# Patient Record
Sex: MALE | Race: WHITE | Employment: UNEMPLOYED | URBAN - METROPOLITAN AREA
[De-identification: names, ages, dates, MRNs, and addresses within clinical notes are randomized per-mention and may not be internally consistent; named-entity substitution may affect disease eponyms.]

---

## 2017-01-17 RX ORDER — SODIUM CHLORIDE 0.9 % (FLUSH) 0.9 %
10 SYRINGE (ML) INJECTION EVERY 12 HOURS SCHEDULED
Status: CANCELLED | OUTPATIENT
Start: 2017-01-17

## 2017-01-17 RX ORDER — SODIUM CHLORIDE, SODIUM LACTATE, POTASSIUM CHLORIDE, CALCIUM CHLORIDE 600; 310; 30; 20 MG/100ML; MG/100ML; MG/100ML; MG/100ML
INJECTION, SOLUTION INTRAVENOUS CONTINUOUS
Status: CANCELLED | OUTPATIENT
Start: 2017-01-17

## 2017-01-17 RX ORDER — SODIUM CHLORIDE 0.9 % (FLUSH) 0.9 %
10 SYRINGE (ML) INJECTION PRN
Status: CANCELLED | OUTPATIENT
Start: 2017-01-17

## 2017-01-17 RX ORDER — LIDOCAINE HYDROCHLORIDE 10 MG/ML
1 INJECTION, SOLUTION EPIDURAL; INFILTRATION; INTRACAUDAL; PERINEURAL
Status: CANCELLED | OUTPATIENT
Start: 2017-01-17 | End: 2017-01-17

## 2017-01-18 ENCOUNTER — HOSPITAL ENCOUNTER (OUTPATIENT)
Dept: SURGERY | Age: 67
Discharge: HOME | End: 2017-02-06
Attending: UROLOGY | Admitting: UROLOGY

## 2017-01-18 DIAGNOSIS — J44.9 CHRONIC OBSTRUCTIVE PULMONARY DISEASE (HCC): ICD-10-CM

## 2017-01-31 ENCOUNTER — OFFICE VISIT (OUTPATIENT)
Dept: PULMONOLOGY | Age: 67
End: 2017-01-31

## 2017-01-31 ENCOUNTER — HOSPITAL ENCOUNTER (OUTPATIENT)
Dept: PULMONOLOGY | Age: 67
Discharge: HOME | End: 2017-01-31
Attending: INTERNAL MEDICINE | Admitting: INTERNAL MEDICINE

## 2017-01-31 VITALS
SYSTOLIC BLOOD PRESSURE: 102 MMHG | HEART RATE: 89 BPM | BODY MASS INDEX: 32.65 KG/M2 | WEIGHT: 196 LBS | OXYGEN SATURATION: 97 % | RESPIRATION RATE: 18 BRPM | HEIGHT: 65 IN | TEMPERATURE: 97.7 F | DIASTOLIC BLOOD PRESSURE: 67 MMHG

## 2017-01-31 DIAGNOSIS — J44.9 COPD, SEVERE (HCC): Primary | ICD-10-CM

## 2017-01-31 DIAGNOSIS — Z72.0 TOBACCO ABUSE: ICD-10-CM

## 2017-01-31 DIAGNOSIS — J44.9 CHRONIC OBSTRUCTIVE PULMONARY DISEASE (HCC): ICD-10-CM

## 2017-01-31 DIAGNOSIS — J96.11 CHRONIC RESPIRATORY FAILURE WITH HYPOXIA (HCC): ICD-10-CM

## 2017-01-31 PROCEDURE — 99214 OFFICE O/P EST MOD 30 MIN: CPT | Performed by: INTERNAL MEDICINE

## 2017-01-31 RX ORDER — ALBUTEROL SULFATE 2.5 MG/3ML
2.5 SOLUTION RESPIRATORY (INHALATION) ONCE
Status: COMPLETED | OUTPATIENT
Start: 2017-01-31 | End: 2017-01-31

## 2017-01-31 RX ADMIN — ALBUTEROL SULFATE 2.5 MG: 2.5 SOLUTION RESPIRATORY (INHALATION) at 11:44

## 2017-08-08 ENCOUNTER — TELEPHONE (OUTPATIENT)
Dept: PULMONOLOGY | Age: 67
End: 2017-08-08

## 2018-12-11 ENCOUNTER — HOSPITAL ENCOUNTER (INPATIENT)
Age: 68
LOS: 6 days | Discharge: HOME | DRG: 243 | End: 2018-12-17
Attending: INTERNAL MEDICINE | Admitting: INTERNAL MEDICINE
Payer: MEDICAID

## 2018-12-11 PROBLEM — J18.9 PNEUMONIA: Status: ACTIVE | Noted: 2018-12-11

## 2018-12-11 PROBLEM — R13.10 DYSPHAGIA: Status: ACTIVE | Noted: 2018-12-11

## 2018-12-11 LAB
A/G RATIO: 1.3 (ref 1.1–2.2)
ALBUMIN SERPL-MCNC: 3.4 G/DL (ref 3.4–5)
ALP BLD-CCNC: 56 U/L (ref 40–129)
ALT SERPL-CCNC: <5 U/L (ref 10–40)
ANION GAP SERPL CALCULATED.3IONS-SCNC: 6 MMOL/L (ref 3–16)
AST SERPL-CCNC: 9 U/L (ref 15–37)
BASOPHILS ABSOLUTE: 0 K/UL (ref 0–0.2)
BASOPHILS RELATIVE PERCENT: 0.5 %
BILIRUB SERPL-MCNC: 0.4 MG/DL (ref 0–1)
BUN BLDV-MCNC: 10 MG/DL (ref 7–20)
CALCIUM SERPL-MCNC: 9.7 MG/DL (ref 8.3–10.6)
CHLORIDE BLD-SCNC: 97 MMOL/L (ref 99–110)
CO2: 34 MMOL/L (ref 21–32)
CREAT SERPL-MCNC: 0.7 MG/DL (ref 0.8–1.3)
EOSINOPHILS ABSOLUTE: 0 K/UL (ref 0–0.6)
EOSINOPHILS RELATIVE PERCENT: 0.2 %
GFR AFRICAN AMERICAN: >60
GFR NON-AFRICAN AMERICAN: >60
GLOBULIN: 2.7 G/DL
GLUCOSE BLD-MCNC: 102 MG/DL (ref 70–99)
HCT VFR BLD CALC: 34.4 % (ref 40.5–52.5)
HEMOGLOBIN: 11.1 G/DL (ref 13.5–17.5)
LYMPHOCYTES ABSOLUTE: 1.4 K/UL (ref 1–5.1)
LYMPHOCYTES RELATIVE PERCENT: 17.3 %
MAGNESIUM: 1.7 MG/DL (ref 1.8–2.4)
MCH RBC QN AUTO: 26.6 PG (ref 26–34)
MCHC RBC AUTO-ENTMCNC: 32.2 G/DL (ref 31–36)
MCV RBC AUTO: 82.5 FL (ref 80–100)
MONOCYTES ABSOLUTE: 0.9 K/UL (ref 0–1.3)
MONOCYTES RELATIVE PERCENT: 10.3 %
NEUTROPHILS ABSOLUTE: 6 K/UL (ref 1.7–7.7)
NEUTROPHILS RELATIVE PERCENT: 71.7 %
PDW BLD-RTO: 22.5 % (ref 12.4–15.4)
PLATELET # BLD: 222 K/UL (ref 135–450)
PMV BLD AUTO: 8.9 FL (ref 5–10.5)
POTASSIUM REFLEX MAGNESIUM: 3.3 MMOL/L (ref 3.5–5.1)
RBC # BLD: 4.17 M/UL (ref 4.2–5.9)
SODIUM BLD-SCNC: 137 MMOL/L (ref 136–145)
TOTAL PROTEIN: 6.1 G/DL (ref 6.4–8.2)
WBC # BLD: 8.3 K/UL (ref 4–11)

## 2018-12-11 PROCEDURE — 94667 MNPJ CHEST WALL 1ST: CPT

## 2018-12-11 PROCEDURE — 94664 DEMO&/EVAL PT USE INHALER: CPT

## 2018-12-11 PROCEDURE — 83550 IRON BINDING TEST: CPT

## 2018-12-11 PROCEDURE — 2580000003 HC RX 258: Performed by: PHYSICIAN ASSISTANT

## 2018-12-11 PROCEDURE — 2700000000 HC OXYGEN THERAPY PER DAY

## 2018-12-11 PROCEDURE — 83735 ASSAY OF MAGNESIUM: CPT

## 2018-12-11 PROCEDURE — 6370000000 HC RX 637 (ALT 250 FOR IP): Performed by: INTERNAL MEDICINE

## 2018-12-11 PROCEDURE — 99223 1ST HOSP IP/OBS HIGH 75: CPT | Performed by: PHYSICIAN ASSISTANT

## 2018-12-11 PROCEDURE — 36415 COLL VENOUS BLD VENIPUNCTURE: CPT

## 2018-12-11 PROCEDURE — 83540 ASSAY OF IRON: CPT

## 2018-12-11 PROCEDURE — 80053 COMPREHEN METABOLIC PANEL: CPT

## 2018-12-11 PROCEDURE — 82607 VITAMIN B-12: CPT

## 2018-12-11 PROCEDURE — 82746 ASSAY OF FOLIC ACID SERUM: CPT

## 2018-12-11 PROCEDURE — 6360000002 HC RX W HCPCS: Performed by: PHYSICIAN ASSISTANT

## 2018-12-11 PROCEDURE — 94640 AIRWAY INHALATION TREATMENT: CPT

## 2018-12-11 PROCEDURE — 1200000000 HC SEMI PRIVATE

## 2018-12-11 PROCEDURE — 85025 COMPLETE CBC W/AUTO DIFF WBC: CPT

## 2018-12-11 PROCEDURE — 82728 ASSAY OF FERRITIN: CPT

## 2018-12-11 PROCEDURE — 94668 MNPJ CHEST WALL SBSQ: CPT

## 2018-12-11 PROCEDURE — 6370000000 HC RX 637 (ALT 250 FOR IP): Performed by: PHYSICIAN ASSISTANT

## 2018-12-11 PROCEDURE — 93005 ELECTROCARDIOGRAM TRACING: CPT | Performed by: INTERNAL MEDICINE

## 2018-12-11 PROCEDURE — 94150 VITAL CAPACITY TEST: CPT

## 2018-12-11 RX ORDER — IPRATROPIUM BROMIDE AND ALBUTEROL SULFATE 2.5; .5 MG/3ML; MG/3ML
1 SOLUTION RESPIRATORY (INHALATION)
Status: DISCONTINUED | OUTPATIENT
Start: 2018-12-11 | End: 2018-12-14

## 2018-12-11 RX ORDER — PREDNISONE 10 MG/1
30 TABLET ORAL DAILY
Status: ON HOLD | COMMUNITY
Start: 2018-12-12 | End: 2018-12-13 | Stop reason: HOSPADM

## 2018-12-11 RX ORDER — SUCRALFATE ORAL 1 G/10ML
1 SUSPENSION ORAL
Status: DISCONTINUED | OUTPATIENT
Start: 2018-12-11 | End: 2018-12-13

## 2018-12-11 RX ORDER — PANTOPRAZOLE SODIUM 40 MG/1
40 TABLET, DELAYED RELEASE ORAL
Status: DISCONTINUED | OUTPATIENT
Start: 2018-12-12 | End: 2018-12-13

## 2018-12-11 RX ORDER — DILTIAZEM HYDROCHLORIDE 240 MG/1
240 CAPSULE, EXTENDED RELEASE ORAL DAILY
Status: ON HOLD | COMMUNITY
Start: 2018-12-12 | End: 2018-12-13 | Stop reason: HOSPADM

## 2018-12-11 RX ORDER — DILTIAZEM HYDROCHLORIDE 240 MG/1
240 CAPSULE, COATED, EXTENDED RELEASE ORAL DAILY
Status: DISCONTINUED | OUTPATIENT
Start: 2018-12-11 | End: 2018-12-12

## 2018-12-11 RX ORDER — PANTOPRAZOLE SODIUM 40 MG/1
40 TABLET, DELAYED RELEASE ORAL DAILY
Status: ON HOLD | COMMUNITY
Start: 2018-12-12 | End: 2018-12-25

## 2018-12-11 RX ORDER — ALBUTEROL SULFATE 90 UG/1
2 AEROSOL, METERED RESPIRATORY (INHALATION) EVERY 6 HOURS PRN
Status: DISCONTINUED | OUTPATIENT
Start: 2018-12-11 | End: 2018-12-14

## 2018-12-11 RX ORDER — SODIUM CHLORIDE 0.9 % (FLUSH) 0.9 %
10 SYRINGE (ML) INJECTION PRN
Status: DISCONTINUED | OUTPATIENT
Start: 2018-12-11 | End: 2018-12-17 | Stop reason: HOSPADM

## 2018-12-11 RX ORDER — ONDANSETRON 2 MG/ML
4 INJECTION INTRAMUSCULAR; INTRAVENOUS EVERY 6 HOURS PRN
Status: DISCONTINUED | OUTPATIENT
Start: 2018-12-11 | End: 2018-12-17 | Stop reason: HOSPADM

## 2018-12-11 RX ORDER — TAMSULOSIN HYDROCHLORIDE 0.4 MG/1
0.4 CAPSULE ORAL DAILY
Status: DISCONTINUED | OUTPATIENT
Start: 2018-12-11 | End: 2018-12-17 | Stop reason: HOSPADM

## 2018-12-11 RX ORDER — CEFDINIR 300 MG/1
300 CAPSULE ORAL 2 TIMES DAILY
Status: ON HOLD | COMMUNITY
Start: 2018-12-11 | End: 2018-12-13 | Stop reason: HOSPADM

## 2018-12-11 RX ORDER — LEVOFLOXACIN 5 MG/ML
750 INJECTION, SOLUTION INTRAVENOUS EVERY 24 HOURS
Status: DISCONTINUED | OUTPATIENT
Start: 2018-12-11 | End: 2018-12-15

## 2018-12-11 RX ORDER — SUCRALFATE ORAL 1 G/10ML
1 SUSPENSION ORAL 4 TIMES DAILY
COMMUNITY
Start: 2018-12-11

## 2018-12-11 RX ORDER — IPRATROPIUM BROMIDE AND ALBUTEROL SULFATE 2.5; .5 MG/3ML; MG/3ML
1 SOLUTION RESPIRATORY (INHALATION)
Status: DISCONTINUED | OUTPATIENT
Start: 2018-12-11 | End: 2018-12-11

## 2018-12-11 RX ORDER — SODIUM CHLORIDE 9 MG/ML
INJECTION, SOLUTION INTRAVENOUS CONTINUOUS
Status: DISCONTINUED | OUTPATIENT
Start: 2018-12-11 | End: 2018-12-15

## 2018-12-11 RX ORDER — SODIUM CHLORIDE 0.9 % (FLUSH) 0.9 %
10 SYRINGE (ML) INJECTION EVERY 12 HOURS SCHEDULED
Status: DISCONTINUED | OUTPATIENT
Start: 2018-12-11 | End: 2018-12-17 | Stop reason: HOSPADM

## 2018-12-11 RX ADMIN — SUCRALFATE 1 G: 1 SUSPENSION ORAL at 20:14

## 2018-12-11 RX ADMIN — LEVOFLOXACIN 750 MG: 5 INJECTION, SOLUTION INTRAVENOUS at 17:25

## 2018-12-11 RX ADMIN — SODIUM CHLORIDE: 9 INJECTION, SOLUTION INTRAVENOUS at 17:22

## 2018-12-11 RX ADMIN — SODIUM CHLORIDE, PRESERVATIVE FREE 10 ML: 5 INJECTION INTRAVENOUS at 17:24

## 2018-12-11 RX ADMIN — IPRATROPIUM BROMIDE AND ALBUTEROL SULFATE 1 AMPULE: .5; 3 SOLUTION RESPIRATORY (INHALATION) at 19:28

## 2018-12-11 RX ADMIN — DILTIAZEM HYDROCHLORIDE 240 MG: 240 CAPSULE, COATED, EXTENDED RELEASE ORAL at 20:14

## 2018-12-11 RX ADMIN — IPRATROPIUM BROMIDE AND ALBUTEROL SULFATE 1 AMPULE: .5; 3 SOLUTION RESPIRATORY (INHALATION) at 23:26

## 2018-12-12 LAB
EKG ATRIAL RATE: 89 BPM
EKG DIAGNOSIS: NORMAL
EKG P AXIS: 59 DEGREES
EKG P-R INTERVAL: 142 MS
EKG Q-T INTERVAL: 382 MS
EKG QRS DURATION: 86 MS
EKG QTC CALCULATION (BAZETT): 464 MS
EKG R AXIS: 63 DEGREES
EKG T AXIS: 58 DEGREES
EKG VENTRICULAR RATE: 89 BPM
FERRITIN: 112.4 NG/ML (ref 30–400)
FOLATE: 8 NG/ML (ref 4.78–24.2)
IRON SATURATION: 12 % (ref 20–50)
IRON: 27 UG/DL (ref 59–158)
TOTAL IRON BINDING CAPACITY: 234 UG/DL (ref 260–445)
VITAMIN B-12: 1005 PG/ML (ref 211–911)

## 2018-12-12 PROCEDURE — 99152 MOD SED SAME PHYS/QHP 5/>YRS: CPT | Performed by: INTERNAL MEDICINE

## 2018-12-12 PROCEDURE — 92610 EVALUATE SWALLOWING FUNCTION: CPT

## 2018-12-12 PROCEDURE — 97116 GAIT TRAINING THERAPY: CPT

## 2018-12-12 PROCEDURE — 97166 OT EVAL MOD COMPLEX 45 MIN: CPT

## 2018-12-12 PROCEDURE — 94761 N-INVAS EAR/PLS OXIMETRY MLT: CPT

## 2018-12-12 PROCEDURE — 6370000000 HC RX 637 (ALT 250 FOR IP): Performed by: INTERNAL MEDICINE

## 2018-12-12 PROCEDURE — 99153 MOD SED SAME PHYS/QHP EA: CPT | Performed by: INTERNAL MEDICINE

## 2018-12-12 PROCEDURE — 6360000002 HC RX W HCPCS: Performed by: INTERNAL MEDICINE

## 2018-12-12 PROCEDURE — 1200000000 HC SEMI PRIVATE

## 2018-12-12 PROCEDURE — 2709999900 HC NON-CHARGEABLE SUPPLY: Performed by: INTERNAL MEDICINE

## 2018-12-12 PROCEDURE — 99232 SBSQ HOSP IP/OBS MODERATE 35: CPT | Performed by: INTERNAL MEDICINE

## 2018-12-12 PROCEDURE — 6360000002 HC RX W HCPCS: Performed by: PHYSICIAN ASSISTANT

## 2018-12-12 PROCEDURE — 97530 THERAPEUTIC ACTIVITIES: CPT

## 2018-12-12 PROCEDURE — G8997 SWALLOW GOAL STATUS: HCPCS

## 2018-12-12 PROCEDURE — G8978 MOBILITY CURRENT STATUS: HCPCS

## 2018-12-12 PROCEDURE — 94668 MNPJ CHEST WALL SBSQ: CPT

## 2018-12-12 PROCEDURE — G8988 SELF CARE GOAL STATUS: HCPCS

## 2018-12-12 PROCEDURE — C1726 CATH, BAL DIL, NON-VASCULAR: HCPCS | Performed by: INTERNAL MEDICINE

## 2018-12-12 PROCEDURE — 97161 PT EVAL LOW COMPLEX 20 MIN: CPT

## 2018-12-12 PROCEDURE — G8979 MOBILITY GOAL STATUS: HCPCS

## 2018-12-12 PROCEDURE — 2700000000 HC OXYGEN THERAPY PER DAY

## 2018-12-12 PROCEDURE — 7100000010 HC PHASE II RECOVERY - FIRST 15 MIN: Performed by: INTERNAL MEDICINE

## 2018-12-12 PROCEDURE — 94664 DEMO&/EVAL PT USE INHALER: CPT

## 2018-12-12 PROCEDURE — 7100000011 HC PHASE II RECOVERY - ADDTL 15 MIN: Performed by: INTERNAL MEDICINE

## 2018-12-12 PROCEDURE — G8996 SWALLOW CURRENT STATUS: HCPCS

## 2018-12-12 PROCEDURE — 0D738ZZ DILATION OF LOWER ESOPHAGUS, VIA NATURAL OR ARTIFICIAL OPENING ENDOSCOPIC: ICD-10-PCS | Performed by: INTERNAL MEDICINE

## 2018-12-12 PROCEDURE — 2580000003 HC RX 258: Performed by: INTERNAL MEDICINE

## 2018-12-12 PROCEDURE — 3609012500 HC EGD DILATION BALLOON: Performed by: INTERNAL MEDICINE

## 2018-12-12 PROCEDURE — 93010 ELECTROCARDIOGRAM REPORT: CPT | Performed by: INTERNAL MEDICINE

## 2018-12-12 PROCEDURE — G8987 SELF CARE CURRENT STATUS: HCPCS

## 2018-12-12 PROCEDURE — 94640 AIRWAY INHALATION TREATMENT: CPT

## 2018-12-12 PROCEDURE — 2580000003 HC RX 258: Performed by: PHYSICIAN ASSISTANT

## 2018-12-12 RX ORDER — GABAPENTIN 400 MG/1
400 CAPSULE ORAL 3 TIMES DAILY
COMMUNITY

## 2018-12-12 RX ORDER — POLYETHYLENE GLYCOL 3350 17 G/17G
17 POWDER, FOR SOLUTION ORAL DAILY
Status: ON HOLD | COMMUNITY
Start: 2018-05-04 | End: 2018-12-13 | Stop reason: HOSPADM

## 2018-12-12 RX ORDER — FENTANYL CITRATE 50 UG/ML
INJECTION, SOLUTION INTRAMUSCULAR; INTRAVENOUS PRN
Status: DISCONTINUED | OUTPATIENT
Start: 2018-12-12 | End: 2018-12-14

## 2018-12-12 RX ORDER — FUROSEMIDE 40 MG/1
40 TABLET ORAL DAILY
COMMUNITY
End: 2019-02-28

## 2018-12-12 RX ORDER — DILTIAZEM HYDROCHLORIDE 120 MG/1
120 CAPSULE, COATED, EXTENDED RELEASE ORAL DAILY
Status: DISCONTINUED | OUTPATIENT
Start: 2018-12-13 | End: 2018-12-17 | Stop reason: HOSPADM

## 2018-12-12 RX ORDER — DILTIAZEM HYDROCHLORIDE 240 MG/1
240 CAPSULE, COATED, EXTENDED RELEASE ORAL DAILY
Status: DISCONTINUED | OUTPATIENT
Start: 2018-12-13 | End: 2018-12-12

## 2018-12-12 RX ORDER — NITROGLYCERIN 0.4 MG/1
0.4 TABLET SUBLINGUAL EVERY 5 MIN PRN
COMMUNITY

## 2018-12-12 RX ORDER — ALBUTEROL SULFATE 90 UG/1
2 AEROSOL, METERED RESPIRATORY (INHALATION) EVERY 6 HOURS PRN
COMMUNITY

## 2018-12-12 RX ORDER — LORAZEPAM 1 MG/1
1 TABLET ORAL DAILY PRN
Status: ON HOLD | COMMUNITY
End: 2019-03-06 | Stop reason: SDUPTHER

## 2018-12-12 RX ORDER — MAGNESIUM SULFATE 1 G/100ML
1 INJECTION INTRAVENOUS ONCE
Status: COMPLETED | OUTPATIENT
Start: 2018-12-12 | End: 2018-12-12

## 2018-12-12 RX ORDER — ATORVASTATIN CALCIUM 10 MG/1
10 TABLET, FILM COATED ORAL NIGHTLY
COMMUNITY

## 2018-12-12 RX ORDER — CITALOPRAM 10 MG/1
10 TABLET ORAL DAILY
COMMUNITY

## 2018-12-12 RX ORDER — POTASSIUM CHLORIDE 750 MG/1
10 CAPSULE, EXTENDED RELEASE ORAL DAILY
COMMUNITY
End: 2019-02-28

## 2018-12-12 RX ORDER — 0.9 % SODIUM CHLORIDE 0.9 %
500 INTRAVENOUS SOLUTION INTRAVENOUS ONCE
Status: COMPLETED | OUTPATIENT
Start: 2018-12-12 | End: 2018-12-12

## 2018-12-12 RX ORDER — OXYCODONE HYDROCHLORIDE AND ACETAMINOPHEN 5; 325 MG/1; MG/1
1 TABLET ORAL EVERY 6 HOURS PRN
Status: ON HOLD | COMMUNITY
End: 2019-03-06 | Stop reason: SDUPTHER

## 2018-12-12 RX ORDER — RIVASTIGMINE 4.6 MG/24H
1 PATCH, EXTENDED RELEASE TRANSDERMAL DAILY
Status: ON HOLD | COMMUNITY
End: 2019-05-07 | Stop reason: HOSPADM

## 2018-12-12 RX ORDER — MIDAZOLAM HYDROCHLORIDE 5 MG/ML
INJECTION INTRAMUSCULAR; INTRAVENOUS PRN
Status: DISCONTINUED | OUTPATIENT
Start: 2018-12-12 | End: 2018-12-14

## 2018-12-12 RX ORDER — OXYCODONE HYDROCHLORIDE AND ACETAMINOPHEN 5; 325 MG/1; MG/1
1 TABLET ORAL ONCE
Status: COMPLETED | OUTPATIENT
Start: 2018-12-12 | End: 2018-12-12

## 2018-12-12 RX ADMIN — IPRATROPIUM BROMIDE AND ALBUTEROL SULFATE 1 AMPULE: .5; 3 SOLUTION RESPIRATORY (INHALATION) at 23:20

## 2018-12-12 RX ADMIN — LEVOFLOXACIN 750 MG: 5 INJECTION, SOLUTION INTRAVENOUS at 16:50

## 2018-12-12 RX ADMIN — SODIUM CHLORIDE 500 ML: 9 INJECTION, SOLUTION INTRAVENOUS at 07:56

## 2018-12-12 RX ADMIN — IPRATROPIUM BROMIDE AND ALBUTEROL SULFATE 1 AMPULE: .5; 3 SOLUTION RESPIRATORY (INHALATION) at 15:49

## 2018-12-12 RX ADMIN — SODIUM CHLORIDE: 9 INJECTION, SOLUTION INTRAVENOUS at 20:13

## 2018-12-12 RX ADMIN — OXYCODONE HYDROCHLORIDE AND ACETAMINOPHEN 1 TABLET: 5; 325 TABLET ORAL at 22:45

## 2018-12-12 RX ADMIN — SODIUM CHLORIDE: 9 INJECTION, SOLUTION INTRAVENOUS at 14:03

## 2018-12-12 RX ADMIN — IPRATROPIUM BROMIDE AND ALBUTEROL SULFATE 1 AMPULE: .5; 3 SOLUTION RESPIRATORY (INHALATION) at 07:24

## 2018-12-12 RX ADMIN — MAGNESIUM SULFATE HEPTAHYDRATE 1 G: 1 INJECTION, SOLUTION INTRAVENOUS at 09:16

## 2018-12-12 RX ADMIN — Medication 10 ML: at 09:15

## 2018-12-12 RX ADMIN — SUCRALFATE 1 G: 1 SUSPENSION ORAL at 16:50

## 2018-12-12 RX ADMIN — IPRATROPIUM BROMIDE AND ALBUTEROL SULFATE 1 AMPULE: .5; 3 SOLUTION RESPIRATORY (INHALATION) at 19:25

## 2018-12-12 ASSESSMENT — PAIN SCALES - GENERAL
PAINLEVEL_OUTOF10: 0
PAINLEVEL_OUTOF10: 8
PAINLEVEL_OUTOF10: 0
PAINLEVEL_OUTOF10: 0

## 2018-12-12 ASSESSMENT — PAIN DESCRIPTION - PAIN TYPE: TYPE: CHRONIC PAIN

## 2018-12-12 ASSESSMENT — PAIN - FUNCTIONAL ASSESSMENT: PAIN_FUNCTIONAL_ASSESSMENT: 0-10

## 2018-12-12 ASSESSMENT — PAIN DESCRIPTION - LOCATION: LOCATION: CHEST

## 2018-12-12 NOTE — PROGRESS NOTES
progress IND with functional mobility. Pt. Limited during evaluation by weakness, poor safety awareness    Goal(s) : To be met in 3 visits:  1). Independent with PLB technique x10 reps    To be met in 6 visits:  1). Supine to/from sit: Supervision  2). Sit to/from stand: SBA  3). Bed to chair: SBA  4). Gait x 150' with LRAD and SBA  5). Tolerate B LE exercises 10 reps  6). Up and Down steps x 2 with HR and CGA    Rehabilitation Potential   Good for goals listed above. Strengths for achieving goals include: PLOF  Barriers to achieving goals include: poor safety awareness    Plan    To be seen 5x/week while in acute care setting for therapeutic exercises, bed mobility, transfers, progressive gait training, balance training, and family/patient education. Timed Code Treatment Minutes:  25 minutes  Total Treatment Time:   35 minutes    Lucinda Pham PT, DPT #[id number]    If patient discharges from this facility prior to next visit, this note will serve as the Discharge Summary.

## 2018-12-12 NOTE — PROGRESS NOTES
Per wife, med list we received from [address] in [place] is incomplete, she states he also gets medication from [facility] in [place].  Will call to get complete med list.

## 2018-12-12 NOTE — CONSULTS
History and Physical / Pre-Sedation Assessment    Patient:  Oneyda Riley   :   1950     Intended Procedure:  EGD    HPI: 80year old male with history of COPD, dementia admitted with dysphagia. Nurses notes reviewed and agreed. Medications reviewed  Allergies: Allergies   Allergen Reactions    Heparin      Pt was HIT positive in        Physical Exam:  Vital Signs: BP (!) 97/54   Pulse 67   Temp 98.1 °F (36.7 °C) (Temporal)   Resp 16   SpO2 97%    Airway: Mallampati: II (soft palate, uvula, fauces visible)  Pulmonary:Normal  Cardiac:Normal  Abdomen:Normal    Pre-Procedure Assessment / Plan:  ASA: Class 2 - A normal healthy patient with mild systemic disease  Level of Sedation Plan: Moderate sedation  Post Procedure plan: Return to same level of care    I assessed the patient and find that the patient is in satisfactory condition to proceed with the planned procedure and sedation plan. I have explained the risk, benefits, and alternatives to the procedure; the patient understands and agrees to proceed.        Nisa Herrera  2018

## 2018-12-12 NOTE — PROGRESS NOTES
Spoke to Dr. Deri Gaucher regarding lower BP's. New order for NS Bolus 500 ml over 1 hr once with repeat back.       12/12/18 0744   Vital Signs   Temp 98.1 °F (36.7 °C)   Temp Source Oral   Pulse 76   Resp 16   BP (!) 82/49   BP Location Right upper arm   BP Upper/Lower Upper   Patient Position Semi fowlers   Level of Consciousness 0   MEWS Score 2   Oxygen Therapy   SpO2 93 %   O2 Device Nasal cannula   O2 Flow Rate (L/min) 2 L/min

## 2018-12-12 NOTE — CARE COORDINATION
Case Management Assessment  Initial Evaluation    Date/Time of Evaluation: 12/12/2018 1:52 PM  Assessment Completed by: Rudolph Hadley    Patient Name: Delano Sawyer  YOB: 1950  Diagnosis: Pneumonia [J18.9]  Date / Time: 12/11/2018  3:56 PM  Admission status/Date:inpatient 12/11/18  Chart Reviewed: Yes      Patient Interviewed: No   Family Interviewed:  Yes - Nini Sharla      Hospitalization in the last 30 days:  No    Contacts  :     Relationship to Patient:   Phone Number:    Alternate Contact:     Relationship to Patient:     Phone Number:      Current PCP  Karena Maria MD      Financial  Commercial    ADLS  Support Systems: Spouse/Significant Other  Transportation: family    Meal Preparation: spouse    Housing  Home Environment: home with spouse  Steps: two  Plans to Return to Present Housing: Yes  Other Identified Issues: no    Home Care Information  Currently active with [address] : No  Type of Home Care Services: Aide Services, Nursing Services  Passport/Waiver : No  Passport/Waiver Services:   no    Durable Medical Equipment   DME Provider: [employer] for home O2  Equipment: nelsy DONALDSON    Has Home O2 in place on admit:  Yes  Informed of need to bring portable home O2 tank on day of discharge for nursing to connect prior to leaving:   Yes  Verbalized agreement/Understanding:   Yes    Community Service Affiliation  Dialysis:  No  Outpatient PT/OT: No    Cancer Center: No     CHF Clinic: No     Pulmonary Rehab: No  Pain Clinic: No  Community Mental Health: No    Wound Clinic: No     Other:   no    DISCHARGE PLAN: Reviewed Chart. Pt confused. Tele-sitter at bedside. Writer spoke with pt's Nini Magdaleno, for initial interview. Role of dcp explained. CM unable to reach spouse as mailbox is not set up. Pt from home with spouse and plan return with [facility] SNF. Awaiting PT evaluation. Pt will need transportation home at d/c as spouse does not drive per Nini Sharla. Following.

## 2018-12-12 NOTE — PROGRESS NOTES
Pt c/o \"chest pain\". States \"all the way across my chest but that was a little bit ago\". States \"Now is better\". Notified Aissatou, clinical, new order for stat EKG. Pt asked this RN 3 times while in the room what the telesitter was & each time was explained. PM assessment complete. Shanthi meds given. Bed alarm in place. Call light in reach. Will cont to monitor.  Humera Coles

## 2018-12-12 NOTE — PROGRESS NOTES
Called to see pt about c/o chest pain. Upon entering room, pt awake and denies chest pain at this time. States \"it's not uncommon for me to have chest pain, I have always had it since I was a teenager. \" EKG completed. Per hospitalist H&P, Cardiology felt his chest pain was non-cardiac Pt appears in no distress or discomfort at this time.  Carley Harris Clinical

## 2018-12-12 NOTE — PROGRESS NOTES
Acute Care COPD Evaluation  Occupational Therapy Evaluation    Unit: 2West   Date:  12/12/2018  Patient Name:    Sharan Gutiérrez  Admitting diagnosis:  Pneumonia [J18.9]  Admit Date:  12/11/2018  Precautions/Restrictions/WB Status/ Lines/ Wounds/ Oxygen: 2L O2, telesitter, IV, bed/chair alarm, fall risk, impulsive  Treatment Time:  6031-5120    COPD Protocol Day / Treatment #: 1    Patient Goals for Therapy:   get out of here     Discharge/Disposition Recommendations:                    AM-PAC Score: 15   SNF  DME needs for discharge: consider RW      Preadmission Environment    Pt. Lives with spouse and her dtr  Home environment:  Trailer  Steps to enter first floor:   2 with HR (on R going up)  Bathroom: [address] owned: , LORE[place], w/c              O2 related equipment: home O2 (2-3L), pulse ox, concentrator, inhaler, nebulizer     Preadmission Status   Pt. Not Able to drive   Pt. Is IND with dressing (occasional assist)  Pt. Had assistance from spouse for bathing, cooking, cleaning, laundry   Pt. Fully independent for transfers and gait and walked with SW  History of falls: Denies  Pt. fully independent with use O2 equipment    Pain    Rating: denies  Pain Medicine Status: Denies need    Cognition    A&O to person, place, year; stated July and August as guesses for month and unable to state December even with cues,    Patient lying supine in bed with no family present. Follows 1 step commands. Impulsivity noted. Appeared to perseverate on functional mobility in room, adamant about ability to walk a long distance, \"I hitchiked from [place],\" and needed max cues for incorporating seated rest break.     Upper Extremity ROM / Strength    WFL B'ly, shoulder flexion AROM limited to about 100 degrees bilaterally                     Upper Extremity Sensation     No apparent deficits     Upper Extremity Proprioception    No apparent deficits     Skin Integrity Visible skin intact    Coordination and Tone

## 2018-12-12 NOTE — PROGRESS NOTES
supervision or strategies. Two or more diet consistencies restricted   Pt seen upright in chair, alert and agreeable to evaluation, however, uncooperative with SLP at times. RN OK'd SLP entry and evaluation. Pt had EGD with dilation completed earlier this date and currently on clear liquid diet only per MD.  Pt demanding \"real food\"-- solid food trials during BSE OK'd per RN. Pt with extensive GI history per chart. Pt also has hx of COPD, emphysema, and PNA. Pt is a current smoker. Pt seen at [facility] (treated for CAP there) and transferred to [facility] for GI consult. Pt with extensive GI hx (pt had EGD with dilation completed this date, as well as, October and November 2018). Pt had MBSS completed on 6/29/18 and 8/21/18 per chart with most recent study reporting laryngeal penetration with both thin and nectar-thick liquid trials (unable to view full report in EMR). Pt currently on 2 L O2 NC. He presents with weak, hoarse vocal quality. Pt able to complete timely volitional swallow and congested volitional cough. Pt required encouragement throughout BSE to accept minimal PO trials (pt requesting \"cottage cheese\" and stating \"I want to eat a chicken leg\"). SLP reviewed previous soft solid diet recommendations at length with pt, however, pt refused teaching, stating \"I eat what I want\". Pt adamantly denied dysphagia when asked by SLP. Pt observed with thin liquid trials via cup and straw--grossly timely swallow initiation noted throughout. Pt quite impulsive and benefited from cueing to take small, single sips when drinking. Pt with immediate, congested cough post-swallow with TL trials via straw sip. No overt s/s of aspiration/penetration observed with TL trials via cup sip-- no coughing, throat clearing, wet vocal quality, or change in O2. Pt's laryngeal elevation appeared [facility] based on palpation of the anterior neck.   Pt also observed with puree and soft solid trials (again, solid PO trials during BSE

## 2018-12-12 NOTE — PLAN OF CARE
Problem: Falls - Risk of:  Goal: Will remain free from falls  Will remain free from falls   Outcome: Ongoing  Bed alarm    Problem: Airway Clearance - Ineffective:  Goal: Clear lung sounds  Clear lung sounds  Outcome: Ongoing  crackles    Problem: Hyperthermia:  Goal: Ability to maintain a body temperature in the normal range will improve  Ability to maintain a body temperature in the normal range will improve  Outcome: Met This Shift  afebrile

## 2018-12-13 ENCOUNTER — APPOINTMENT (OUTPATIENT)
Dept: GENERAL RADIOLOGY | Age: 68
DRG: 243 | End: 2018-12-13
Attending: INTERNAL MEDICINE
Payer: MEDICAID

## 2018-12-13 LAB
BASOPHILS ABSOLUTE: 0 K/UL (ref 0–0.2)
BASOPHILS RELATIVE PERCENT: 0.5 %
EOSINOPHILS ABSOLUTE: 0.2 K/UL (ref 0–0.6)
EOSINOPHILS RELATIVE PERCENT: 2.9 %
HCT VFR BLD CALC: 28.4 % (ref 40.5–52.5)
HEMOGLOBIN: 9.3 G/DL (ref 13.5–17.5)
LYMPHOCYTES ABSOLUTE: 1.8 K/UL (ref 1–5.1)
LYMPHOCYTES RELATIVE PERCENT: 27 %
MCH RBC QN AUTO: 27 PG (ref 26–34)
MCHC RBC AUTO-ENTMCNC: 32.7 G/DL (ref 31–36)
MCV RBC AUTO: 82.4 FL (ref 80–100)
MONOCYTES ABSOLUTE: 0.9 K/UL (ref 0–1.3)
MONOCYTES RELATIVE PERCENT: 12.6 %
NEUTROPHILS ABSOLUTE: 3.8 K/UL (ref 1.7–7.7)
NEUTROPHILS RELATIVE PERCENT: 57 %
PDW BLD-RTO: 22.7 % (ref 12.4–15.4)
PLATELET # BLD: 209 K/UL (ref 135–450)
PMV BLD AUTO: 8.8 FL (ref 5–10.5)
RBC # BLD: 3.45 M/UL (ref 4.2–5.9)
WBC # BLD: 6.7 K/UL (ref 4–11)

## 2018-12-13 PROCEDURE — 3609012500 HC EGD DILATION BALLOON: Performed by: INTERNAL MEDICINE

## 2018-12-13 PROCEDURE — 94668 MNPJ CHEST WALL SBSQ: CPT

## 2018-12-13 PROCEDURE — 6360000002 HC RX W HCPCS: Performed by: INTERNAL MEDICINE

## 2018-12-13 PROCEDURE — 7100000011 HC PHASE II RECOVERY - ADDTL 15 MIN: Performed by: INTERNAL MEDICINE

## 2018-12-13 PROCEDURE — 99232 SBSQ HOSP IP/OBS MODERATE 35: CPT | Performed by: INTERNAL MEDICINE

## 2018-12-13 PROCEDURE — C1726 CATH, BAL DIL, NON-VASCULAR: HCPCS | Performed by: INTERNAL MEDICINE

## 2018-12-13 PROCEDURE — 36415 COLL VENOUS BLD VENIPUNCTURE: CPT

## 2018-12-13 PROCEDURE — 94761 N-INVAS EAR/PLS OXIMETRY MLT: CPT

## 2018-12-13 PROCEDURE — 2580000003 HC RX 258: Performed by: INTERNAL MEDICINE

## 2018-12-13 PROCEDURE — 74220 X-RAY XM ESOPHAGUS 1CNTRST: CPT

## 2018-12-13 PROCEDURE — 0D738ZZ DILATION OF LOWER ESOPHAGUS, VIA NATURAL OR ARTIFICIAL OPENING ENDOSCOPIC: ICD-10-PCS | Performed by: INTERNAL MEDICINE

## 2018-12-13 PROCEDURE — 6370000000 HC RX 637 (ALT 250 FOR IP): Performed by: INTERNAL MEDICINE

## 2018-12-13 PROCEDURE — 1200000000 HC SEMI PRIVATE

## 2018-12-13 PROCEDURE — 94640 AIRWAY INHALATION TREATMENT: CPT

## 2018-12-13 PROCEDURE — 85025 COMPLETE CBC W/AUTO DIFF WBC: CPT

## 2018-12-13 PROCEDURE — 2709999900 HC NON-CHARGEABLE SUPPLY: Performed by: INTERNAL MEDICINE

## 2018-12-13 PROCEDURE — 7100000010 HC PHASE II RECOVERY - FIRST 15 MIN: Performed by: INTERNAL MEDICINE

## 2018-12-13 PROCEDURE — 6360000002 HC RX W HCPCS: Performed by: PHYSICIAN ASSISTANT

## 2018-12-13 PROCEDURE — 2700000000 HC OXYGEN THERAPY PER DAY

## 2018-12-13 PROCEDURE — 92526 ORAL FUNCTION THERAPY: CPT

## 2018-12-13 PROCEDURE — 99152 MOD SED SAME PHYS/QHP 5/>YRS: CPT | Performed by: INTERNAL MEDICINE

## 2018-12-13 PROCEDURE — 99153 MOD SED SAME PHYS/QHP EA: CPT | Performed by: INTERNAL MEDICINE

## 2018-12-13 PROCEDURE — 2580000003 HC RX 258: Performed by: PHYSICIAN ASSISTANT

## 2018-12-13 RX ORDER — SUCRALFATE ORAL 1 G/10ML
1 SUSPENSION ORAL EVERY 6 HOURS SCHEDULED
Status: DISCONTINUED | OUTPATIENT
Start: 2018-12-13 | End: 2018-12-17 | Stop reason: HOSPADM

## 2018-12-13 RX ORDER — SODIUM CHLORIDE, SODIUM LACTATE, POTASSIUM CHLORIDE, CALCIUM CHLORIDE 600; 310; 30; 20 MG/100ML; MG/100ML; MG/100ML; MG/100ML
INJECTION, SOLUTION INTRAVENOUS CONTINUOUS PRN
Status: DISCONTINUED | OUTPATIENT
Start: 2018-12-13 | End: 2018-12-14

## 2018-12-13 RX ORDER — PANTOPRAZOLE SODIUM 40 MG/1
40 GRANULE, DELAYED RELEASE ORAL
Status: DISCONTINUED | OUTPATIENT
Start: 2018-12-13 | End: 2018-12-17 | Stop reason: HOSPADM

## 2018-12-13 RX ADMIN — IPRATROPIUM BROMIDE AND ALBUTEROL SULFATE 1 AMPULE: .5; 3 SOLUTION RESPIRATORY (INHALATION) at 07:36

## 2018-12-13 RX ADMIN — IPRATROPIUM BROMIDE AND ALBUTEROL SULFATE 1 AMPULE: .5; 3 SOLUTION RESPIRATORY (INHALATION) at 19:35

## 2018-12-13 RX ADMIN — LEVOFLOXACIN 750 MG: 5 INJECTION, SOLUTION INTRAVENOUS at 16:55

## 2018-12-13 RX ADMIN — SODIUM CHLORIDE: 9 INJECTION, SOLUTION INTRAVENOUS at 22:50

## 2018-12-13 RX ADMIN — IPRATROPIUM BROMIDE AND ALBUTEROL SULFATE 1 AMPULE: .5; 3 SOLUTION RESPIRATORY (INHALATION) at 23:00

## 2018-12-13 RX ADMIN — ONDANSETRON 4 MG: 2 INJECTION INTRAMUSCULAR; INTRAVENOUS at 22:50

## 2018-12-13 RX ADMIN — SUCRALFATE 1 G: 1 SUSPENSION ORAL at 06:04

## 2018-12-13 RX ADMIN — PANTOPRAZOLE SODIUM 40 MG: 40 TABLET, DELAYED RELEASE ORAL at 06:04

## 2018-12-13 RX ADMIN — Medication 10 ML: at 20:15

## 2018-12-13 RX ADMIN — Medication 10 ML: at 10:31

## 2018-12-13 ASSESSMENT — PAIN - FUNCTIONAL ASSESSMENT: PAIN_FUNCTIONAL_ASSESSMENT: 0-10

## 2018-12-13 NOTE — PROGRESS NOTES
Physical Therapy    Attempted PT treatment this afternoon. Pt currently on phone. Will follow-up later today as pt status and therapist's schedule allows.     Anna Morales, PT, DPT #[id number]

## 2018-12-13 NOTE — CARE COORDINATION
DISCHARGE ORDER  Date/Time 2018 10:42 AM  Completed by: Carlos Manuel Isabel, Case Management    Patient Name: Radha Francois    : 1950  Admitting Diagnosis: Pneumonia [J18.9]  Admit Date/Time: 2018  3:56 PM    Noted discharge order. Confirmed discharge plan with patient / family (pt): Yes   Discharge Plan: Reviewed chart. Met with the pt. Pt from home with spouse and plan return. Pt declines SNF at this time. Awaiting call back from spouse r/t hhc agency as pt states he already has a SN for hhc.     11:06am Discharge has been canceled for today as pt vomiting and unable to eat. Follow.

## 2018-12-13 NOTE — PLAN OF CARE
Problem: Falls - Risk of:  Goal: Will remain free from falls  Will remain free from falls   Outcome: Ongoing      Problem: Airway Clearance - Ineffective:  Goal: Clear lung sounds  Clear lung sounds   Outcome: Ongoing      Problem: Fluid Volume - Deficit:  Goal: Achieves intake and output within specified parameters  Achieves intake and output within specified parameters   Outcome: Ongoing      Problem: Hyperthermia:  Goal: Ability to maintain a body temperature in the normal range will improve  Ability to maintain a body temperature in the normal range will improve   Outcome: Ongoing

## 2018-12-13 NOTE — FLOWSHEET NOTE
12/13/18 0745   Vital Signs   Temp 97 °F (36.1 °C)   Temp Source Oral   Pulse 85   Heart Rate Source Monitor   Resp 18   /74   BP Location Right upper arm   BP Upper/Lower Upper   Patient Position Supine   Level of Consciousness 0   MEWS Score 2   Patient Currently in Pain No   Oxygen Therapy   SpO2 95 %   O2 Device Nasal cannula   O2 Flow Rate (L/min) 2 L/min   AM assessment completed, see flow sheet. Pt is alert and oriented. Vital signs are WNL. Will hold cardizem at this time until writer speaks with MD regarding BP. Respirations are even & easy. No complaints voiced. Pt denies needs at this time. SR up x 2, and bed in low position. Call light is within reach.

## 2018-12-13 NOTE — PROGRESS NOTES
PROGRESS NOTE  S:68 yrs Patient  admitted on 12/11/2018 with Pneumonia [J18.9] . He complains of difficulty swallowing after EGD with dilation of 5cm esophageal stricture yesterday. States yesterday he didn't have problems but today food comes back up. Denies chest pain. On soft dental diet. Exam:   Vitals:    12/13/18 0745   BP: 100/74   Pulse: 85   Resp: 18   Temp: 97 °F (36.1 °C)   SpO2: 95%      General appearance: alert, appears stated age and cooperative  HEENT: Sclera clear, anicteric and Neck supple with midline trachea  Neck: supple, symmetrical, trachea midline and thyroid not enlarged, symmetric, no tenderness/mass/nodules  Lungs: clear to auscultation bilaterally  Heart: regular rate and rhythm, S1, S2 normal, no murmur, click, rub or gallop  Abdomen: soft, non-tender; bowel sounds normal; no masses,  no organomegaly  Extremities: extremities normal, atraumatic, no cyanosis or edema     Medications: Reviewed    Labs:  CBC:   Recent Labs      12/11/18   1627  12/13/18   0554   WBC  8.3  6.7   HGB  11.1*  9.3*   HCT  34.4*  28.4*   MCV  82.5  82.4   PLT  222  209     BMP:   Recent Labs      12/11/18   1627   NA  137   K  3.3*   CL  97*   CO2  34*   BUN  10   CREATININE  0.7*     LIVER PROFILE:   Recent Labs      12/11/18   1627   AST  9*   ALT  <5*   PROT  6.1*   BILITOT  0.4   ALKPHOS  56       Impression:  80year old male with history of COPD, dementia admitted with pneumonia and dysphagia. Recommendation:  1. Check stat barium esophagram this am  2. Continue supportive care, treatment of PNA  3. Will need repeat dilation in 1-2 weeks  4.  Dr. Lissett Camejo to see       Melania Azar PA-C  10:39 AM 12/13/2018

## 2018-12-13 NOTE — PROGRESS NOTES
IM Progress Note    Admit Date:  12/11/2018  2    Interval history:  S.p EGD Severe peptic stricture in the lower esophagus, 5 cm long    Subjective:  Mr. Paul Early reports that he still cannot eat any, after taking couple of bites, he throws up  Very worried about weight loss for last few months and asking about feeding tube    Objective:   /74   Pulse 85   Temp 97 °F (36.1 °C) (Oral)   Resp 18   SpO2 95%     Intake/Output Summary (Last 24 hours) at 12/13/18 1144  Last data filed at 12/13/18 0845   Gross per 24 hour   Intake           1874.8 ml   Output              560 ml   Net           1314.8 ml       Physical Exam:        General: elderly male , sick appearing    Awake, alert and oriented. Appears to be not in any distress  Mucous Membranes:  Pink , anicteric  Neck: No JVD, no carotid bruit, no thyromegaly  Chest:  Clear to auscultation bilaterally, no added sounds  Cardiovascular:  RRR S1S2 heard, no murmurs or gallops  Abdomen:  Soft, undistended, non tender, no organomegaly, BS present  Extremities: No edema or cyanosis.  Distal pulses well felt  Neurological : grossly normal      Medications:   Scheduled Medications:    diltiazem  120 mg Oral Daily    sodium chloride flush  10 mL Intravenous 2 times per day    levofloxacin  750 mg Intravenous Q24H    influenza virus vaccine  0.5 mL Intramuscular Once    ipratropium-albuterol  1 ampule Inhalation Q4H While awake    pantoprazole  40 mg Oral QAM AC    tamsulosin  0.4 mg Oral Daily    sucralfate  1 g Oral 4x Daily AC & HS     I   sodium chloride 75 mL/hr at 12/12/18 2013     midazolam, fentaNYL, sodium chloride flush, magnesium hydroxide, ondansetron, albuterol sulfate HFA    Lab Data:  Recent Labs      12/11/18   1627  12/13/18   0554   WBC  8.3  6.7   HGB  11.1*  9.3*   HCT  34.4*  28.4*   MCV  82.5  82.4   PLT  222  209     Recent Labs      12/11/18   1627   NA  137   K  3.3*   CL  97*   CO2  34*   BUN  10   CREATININE  0.7*     No results for input(s): CKTOTAL, CKMB, CKMBINDEX, TROPONINI in the last 72 hours.     Coagulation: No results found for: INR, APTT  Cardiac markers: Lab Results   Component Value Date    TROPONINI <0.01 11/14/2016         Lab Results   Component Value Date    ALT <5 (L) 12/11/2018    AST 9 (L) 12/11/2018    ALKPHOS 56 12/11/2018    BILITOT 0.4 12/11/2018       No results found for: INR, PROTIME    Radiology     ASSESSMENT/PLAN:     Dysphagia likely with esophageal stricture    - transferred from [facility] for chest pain and difficulty swallowing solids  - hx of Esophageal dilatation on 11/13/2018  - EGD with peptic esophageal stricture, s.p dilatation   - still unable to eat , repeat barium swallow  - might need PEG placement for ongoing weight loss and dysphagia issues    CAP - POA  - previously being treated for PNA at [facility]  - likely 2/2 GP organism  - pt with no hypoxia, no fevers, wbc stable   - respiratory culture pending  - Duoneb, Levaquin, PRN albuterol     Chronic Hypoxic Respiratory Failure  - wears 2-3L NC at baseline  - stable on 2L     COPD  - no AE  - cont Duoneb     Normocytic Anemia  - Hgb 11.1, down to 9 with IVF  - normal Iron studies, B12, folate  - monitor CBC     DVT Prophylaxis: SCDs, 2/2 allergies to heparin  Diet: Diet NPO Effective Now  Code Status: full       Boston Hartmann MD 12/13/2018 11:44 AM

## 2018-12-13 NOTE — DISCHARGE INSTR - COC
Risk of Unplanned Readmission:        10           Discharging to Facility/ Agency   · Name:   · Address:  · Phone:  · Fax:    Dialysis Facility (if applicable)   · Name:  · Address:  · Dialysis Schedule:  · Phone:  · Fax:    / signature: {Esignature:[id number]}    PHYSICIAN SECTION    Prognosis: {Prognosis:7317284910}    Condition at Discharge: Yasir8 Monica Wadsworth Patient Condition:377435537}    Rehab Potential (if transferring to Rehab): {Prognosis:2897411536}    Recommended Labs or Other Treatments After Discharge: ***    Physician Certification: I certify the above information and transfer of Zenia Corpus  is necessary for the continuing treatment of the diagnosis listed and that he requires {Admit to Appropriate Level of Care:61140} for {GREATER/LESS:114108856} 30 days.      Update Admission H&P: {CHP DME Changes in FDNKR:280655873}    PHYSICIAN SIGNATURE:  {Esignature:[id number]}

## 2018-12-13 NOTE — OP NOTE
(retroflexion and  forward views), duodenum (bulb, sweep, and second portion) were examined  carefully during withdrawal.  The patient tolerated the procedure well  without any difficulties. FINDINGS:  ESOPHAGUS:  There was moderate amount of barium visualized in the lower  esophagus. This was successfully aggressively lavaged. Initial  attempts showed severe esophagitis at the lower esophagus. Scope was  unable to traverse the tight esophageal stricture, which is consistent  with previous endoscopic findings. A balloon dilation was performed  using 10 mm, 11 mm, and 12 mm CRE balloon catheter sequentially. Then,  the scope was able to traverse the stricture without any difficulty. The esophagitis was identified from 30 to 35 cm from entry. There is a  tight stricture from 35 to 40 cm from entry. There was a 5 cm hiatal  hernia from 40 to 45 cm from entry. STOMACH:  Examined portion of the stomach appeared normal both on  forward and retroflexed views. DUODENUM:  Examined portion of the duodenum appeared normal.    SUMMARY:  1. Severe 5 cm distal esophageal stricture, status post balloon  dilation to 12 mm. 2.  Severe esophagitis, likely peptic in origin. 3.  A 5 cm hiatal hernia in the lower esophagus. RECOMMENDATIONS:  1. Return the patient to the floor for continuous medical care. 2.  Maintain strict full-liquid diet. 3.  Comfort medications in suspension form. 4.  We will start the patient on pantoprazole suspension twice daily and  Carafate 4 times daily. 5.  The patient will need repeat EGD in 1 week for a repeat dilation of  the stricture. Cresenciano Sicard, MD    D: 12/13/2018 16:49:49       T: 12/13/2018 18:15:51     [id number]  Job#: [id number]     Doc#: [id number]    CC:   Janneth Duarte

## 2018-12-14 LAB
BASOPHILS ABSOLUTE: 0 K/UL (ref 0–0.2)
BASOPHILS RELATIVE PERCENT: 0.5 %
EOSINOPHILS ABSOLUTE: 0.2 K/UL (ref 0–0.6)
EOSINOPHILS RELATIVE PERCENT: 3.7 %
HCT VFR BLD CALC: 29.8 % (ref 40.5–52.5)
HEMOGLOBIN: 9.6 G/DL (ref 13.5–17.5)
LYMPHOCYTES ABSOLUTE: 1.4 K/UL (ref 1–5.1)
LYMPHOCYTES RELATIVE PERCENT: 20.6 %
MCH RBC QN AUTO: 27.1 PG (ref 26–34)
MCHC RBC AUTO-ENTMCNC: 32.1 G/DL (ref 31–36)
MCV RBC AUTO: 84.3 FL (ref 80–100)
MONOCYTES ABSOLUTE: 0.8 K/UL (ref 0–1.3)
MONOCYTES RELATIVE PERCENT: 11.8 %
NEUTROPHILS ABSOLUTE: 4.2 K/UL (ref 1.7–7.7)
NEUTROPHILS RELATIVE PERCENT: 63.4 %
PDW BLD-RTO: 22.8 % (ref 12.4–15.4)
PLATELET # BLD: 213 K/UL (ref 135–450)
PMV BLD AUTO: 8.4 FL (ref 5–10.5)
RBC # BLD: 3.53 M/UL (ref 4.2–5.9)
WBC # BLD: 6.6 K/UL (ref 4–11)

## 2018-12-14 PROCEDURE — 1200000000 HC SEMI PRIVATE

## 2018-12-14 PROCEDURE — 94761 N-INVAS EAR/PLS OXIMETRY MLT: CPT

## 2018-12-14 PROCEDURE — 2700000000 HC OXYGEN THERAPY PER DAY

## 2018-12-14 PROCEDURE — 94640 AIRWAY INHALATION TREATMENT: CPT

## 2018-12-14 PROCEDURE — 6370000000 HC RX 637 (ALT 250 FOR IP): Performed by: INTERNAL MEDICINE

## 2018-12-14 PROCEDURE — 99232 SBSQ HOSP IP/OBS MODERATE 35: CPT | Performed by: INTERNAL MEDICINE

## 2018-12-14 PROCEDURE — 51798 US URINE CAPACITY MEASURE: CPT

## 2018-12-14 PROCEDURE — 85025 COMPLETE CBC W/AUTO DIFF WBC: CPT

## 2018-12-14 PROCEDURE — 97530 THERAPEUTIC ACTIVITIES: CPT

## 2018-12-14 PROCEDURE — 6360000002 HC RX W HCPCS: Performed by: INTERNAL MEDICINE

## 2018-12-14 PROCEDURE — 97535 SELF CARE MNGMENT TRAINING: CPT

## 2018-12-14 PROCEDURE — 92526 ORAL FUNCTION THERAPY: CPT

## 2018-12-14 PROCEDURE — 97110 THERAPEUTIC EXERCISES: CPT

## 2018-12-14 PROCEDURE — 6360000002 HC RX W HCPCS: Performed by: PHYSICIAN ASSISTANT

## 2018-12-14 PROCEDURE — 2580000003 HC RX 258: Performed by: PHYSICIAN ASSISTANT

## 2018-12-14 PROCEDURE — 94668 MNPJ CHEST WALL SBSQ: CPT

## 2018-12-14 PROCEDURE — 36415 COLL VENOUS BLD VENIPUNCTURE: CPT

## 2018-12-14 RX ORDER — IPRATROPIUM BROMIDE AND ALBUTEROL SULFATE 2.5; .5 MG/3ML; MG/3ML
1 SOLUTION RESPIRATORY (INHALATION) 2 TIMES DAILY
Status: DISCONTINUED | OUTPATIENT
Start: 2018-12-14 | End: 2018-12-15

## 2018-12-14 RX ORDER — SUCRALFATE ORAL 1 G/10ML
1 SUSPENSION ORAL ONCE
Status: COMPLETED | OUTPATIENT
Start: 2018-12-14 | End: 2018-12-14

## 2018-12-14 RX ORDER — OXYCODONE HYDROCHLORIDE AND ACETAMINOPHEN 5; 325 MG/1; MG/1
1 TABLET ORAL ONCE
Status: COMPLETED | OUTPATIENT
Start: 2018-12-14 | End: 2018-12-14

## 2018-12-14 RX ORDER — ALBUTEROL SULFATE 90 UG/1
2 AEROSOL, METERED RESPIRATORY (INHALATION) EVERY 4 HOURS PRN
Status: DISCONTINUED | OUTPATIENT
Start: 2018-12-14 | End: 2018-12-17 | Stop reason: HOSPADM

## 2018-12-14 RX ADMIN — Medication 10 ML: at 09:04

## 2018-12-14 RX ADMIN — LEVOFLOXACIN 750 MG: 5 INJECTION, SOLUTION INTRAVENOUS at 16:23

## 2018-12-14 RX ADMIN — PROCHLORPERAZINE EDISYLATE 10 MG: 5 INJECTION INTRAMUSCULAR; INTRAVENOUS at 21:39

## 2018-12-14 RX ADMIN — SUCRALFATE 1 G: 1 SUSPENSION ORAL at 17:28

## 2018-12-14 RX ADMIN — ONDANSETRON 4 MG: 2 INJECTION INTRAMUSCULAR; INTRAVENOUS at 11:32

## 2018-12-14 RX ADMIN — SUCRALFATE 1 G: 1 SUSPENSION ORAL at 21:39

## 2018-12-14 RX ADMIN — IPRATROPIUM BROMIDE AND ALBUTEROL SULFATE 1 AMPULE: .5; 3 SOLUTION RESPIRATORY (INHALATION) at 10:52

## 2018-12-14 RX ADMIN — SUCRALFATE 1 G: 1 SUSPENSION ORAL at 06:24

## 2018-12-14 RX ADMIN — IPRATROPIUM BROMIDE AND ALBUTEROL SULFATE 1 AMPULE: .5; 3 SOLUTION RESPIRATORY (INHALATION) at 15:37

## 2018-12-14 RX ADMIN — SUCRALFATE 1 G: 1 SUSPENSION ORAL at 11:32

## 2018-12-14 RX ADMIN — ONDANSETRON 4 MG: 2 INJECTION INTRAMUSCULAR; INTRAVENOUS at 18:22

## 2018-12-14 RX ADMIN — IPRATROPIUM BROMIDE AND ALBUTEROL SULFATE 1 AMPULE: .5; 3 SOLUTION RESPIRATORY (INHALATION) at 19:40

## 2018-12-14 RX ADMIN — SODIUM CHLORIDE: 9 INJECTION, SOLUTION INTRAVENOUS at 16:23

## 2018-12-14 RX ADMIN — OXYCODONE HYDROCHLORIDE AND ACETAMINOPHEN 1 TABLET: 5; 325 TABLET ORAL at 06:24

## 2018-12-14 RX ADMIN — PANTOPRAZOLE SODIUM 40 MG: 40 GRANULE, DELAYED RELEASE ORAL at 16:21

## 2018-12-14 RX ADMIN — PANTOPRAZOLE SODIUM 40 MG: 40 GRANULE, DELAYED RELEASE ORAL at 06:24

## 2018-12-14 RX ADMIN — SODIUM CHLORIDE: 9 INJECTION, SOLUTION INTRAVENOUS at 21:39

## 2018-12-14 RX ADMIN — IPRATROPIUM BROMIDE AND ALBUTEROL SULFATE 1 AMPULE: .5; 3 SOLUTION RESPIRATORY (INHALATION) at 07:00

## 2018-12-14 ASSESSMENT — PAIN SCALES - GENERAL: PAINLEVEL_OUTOF10: 7

## 2018-12-14 NOTE — CARE COORDINATION
INTERDISCIPLINARY PLAN OF CARE CONFERENCE    Date/Time: 12/14/2018 3:16 PM  Completed by: Veronica Faye, Case Management      Patient Name:  Yrn Montemayor  YOB: 1950  Admitting Diagnosis: Pneumonia [J18.9]     Admit Date/Time:  12/11/2018  3:56 PM    Chart reviewed. Interdisciplinary team met to discuss patient progress and discharge plans. Disciplines included Case Management, Nursing, and Dietitian. Current Status: Inpatient 12/11/2018    PT/OT recommendation: SNF    Anticipated Discharge Date: TBD  Expected D/C Disposition:  Home  Confirmed plan with patient: Yes  Discharge Plan Comments: Chart reviewed. Met with pt at bedside and explained the role of the CM. Plan: Adamantly refuses home care and SNF placement. States \"I ain't going to no rehab, I can do it at home\", \"I ain't going to fall, I don't make a habit of falling anyway. \" Reports that he lives w/his wife and she will help him. CM reiterated that the recommendation was for him to have short term rehab in a facility and offered facilities list. Pt refused. Offered home care information and pt again refused. +CM following.     Home O2 in place on admit: No  Pt informed of need to bring portable home O2 tank on day of discharge for nursing to connect prior to leaving:  Not Indicated  Verbalized agreement/Understanding:  Not Indicated

## 2018-12-14 NOTE — PROGRESS NOTES
RESPIRATORY THERAPY ASSESSMENT    Name:  Isatu Park  Medical Record Number:  [id number]  Age: 80 y.o. Gender: male  : 1950  Today's Date:  2018  Room:  02280228-01    Assessment     Is the patient being admitted for a COPD or Asthma exacerbation? No   (If yes the patient will be seen every 4 hours for the first 24 hours and then reassessed)    Patient Admission Diagnosis      Allergies  Allergies   Allergen Reactions    Heparin      Pt was HIT positive in        Minimum Predicted Vital Capacity:    1047         Actual Vital Capacity:                Pulmonary History:COPD  Home Oxygen Therapy:  2 liters/min via nasal cannula hs and prn  Home Respiratory Therapy:tudorza daily, symbicort bid, albuterol daily and prn   Current Respiratory Therapy:  duoneb Q4 w/a   Treatment Type: HHN, Vibratory Mucous Clearing Therapy or Intervention  Medications: Albuterol/Ipratropium    Respiratory Severity Index(RSI)   Patients with orders for inhalation medications, oxygen, or any therapeutic treatment modality will be placed on Respiratory Protocol. They will be assessed with the first treatment and at least every 72 hours thereafter. The following severity scale will be used to determine frequency of treatment intervention.     Smoking History: Smoking History Less than 1ppd or less than 15 pack year = 1    Social History  Social History   Substance Use Topics    Smoking status: Current Every Day Smoker     Packs/day: 1.00     Years: 50.00     Types: Cigarettes    Smokeless tobacco: Never Used    Alcohol use No       Recent Surgical History: None = 0  Past Surgical History  Past Surgical History:   Procedure Laterality Date    UPPER GASTROINTESTINAL ENDOSCOPY N/A 2018    EGD DILATION BALLOON performed by Jose G Smith MD at [place] [address] N/A 2018    EGD DILATION BALLOON performed by Jose G Smith MD at [address] [place] [address]

## 2018-12-14 NOTE — PROGRESS NOTES
Speech Language Pathology  Facility/Department: [facility]  Dysphagia Daily Treatment Note    NAME: Yazmin Kahn  : 1950  MRN: [id number]    Patient Diagnosis(es):   Patient Active Problem List    Diagnosis Date Noted    Community acquired pneumonia 2018    Dysphagia 2018    Chronic obstructive pulmonary disease ([address] 2017    Tobacco abuse 2017    Chronic respiratory failure with hypoxia ([address] 2017     Allergies: Allergies   Allergen Reactions    Heparin      Pt was HIT positive in      Onset Date:   See assessment    Subjective:  Alert in bed, no family present in the room, all visible lines intact and all precautions maintained. Pain:  No report of pain    Current Diet:  Full liquid diet    Diet Tolerance: see esophagram report    P.O. Trials: Thin       Nectar       Honey       Puree       Solid         Dysphagia Therapy: This was the first visit with this SLP on this admission. The patient had medical records reviewed. The patient had WBC 6.6 today. The patient had EGD with esophageal dilation on  and . The patient had nasal cannula in place and was confused. The patient had -18 esophagram which reports per MD of contrast accumulates in the distal esophagus at the site of an obstruction. The report per MD indicates complete obstruction of the distal esophagus. The patient had CBSE which recs dental soft and thin liquids. The patient had 12--18 EGD which reports of severe stricture. The patient had 9-30-18 esophagram per MD reports smooth narrowing of the upper esophagus. The patient had 6-29-18 unremarkable MBS. The patient had 7-3-18 esophagram reporting mucosal irregularity in the distal esophagus. The patient had 8-21-18 MBS reporting penetration with thin and nectar thick liquid. This report suggests pharyngeal component for the patient's deficits.  He is not a candidate for repeat MBS at this time, based on the esophageal condition from the 12-13-18 reports. 11-8-18 EGD reports small hiatal hernia and esophageal stenosis with dilation. The patient had 11-3-18 dilation and stenosis reported on EGD. The patient had 11-12-18 SLP note reporting swallow delay and no sign of aspiration with liquids and they recommended a full liquid diet and no other consistencies tested. Based on the information obtained and the clinical status of the patient at the time of the visit, NPO and temporary non-oral intake is suggested. The patient has had at least 4 esophageal dilations in the past 8 weeks and still the condition is causing bolus obstruction. There will need to be stabilization and resolution of the esophageal condition prior to a repeat MBS which is strongly suggested, when esophageal condition permits. Would not perform MBS at this time. The pharyngeal stage deficits will need to be addressed. Staff should maintain good oral hygiene for this patient and monitor his temperature, lung, diet, and weight status. The patient will not be functional with PO intake until esophageal deficits have resolution, if this is possible. Not a FEES candidate. SLP called by attending nurse regarding dietary consistency upgrade. Initially, the patient was not identified to this SLP via phone and SLP was asked as far as the next level upgrade from full liquid diet. SLP explained that this would depend if the patient was on full liquid for GI reasons or oral and pharyngeal dysphagia reasons. SLP was then asked to explain the difference between level II diet and dental soft. SLP explained that level II diet is generally for patients with oral and pharyngeal stage deficits. SLP explained that dental soft is generally considered more for patients with mastication deficits. She then asked about this particular patient.  SLP explained that he could not safely recommend a PO diet for this patient, based on the information as stated above which

## 2018-12-14 NOTE — PROGRESS NOTES
macey poor safety awareness, requiring CGA to Min A for transfers and mobility. Pt would benefit from SNF at [place] for continue skilled therapy to maximize safety and functional  IND        Goal(s) :   Self Care [id number]  To be met in 3 Visits:  1). Indep with UE ex x 10 reps     To be met in 5 Visits:  1). Supine to Sit IND  2). Bathroom mobility SBA managing oxygen tubing safely with appropriate AD  3). Upper Body Bathing IND  4). Lower Body Bathing MIN A  5). Upper Body Dressing IND  6). Lower Body Dressing MIN A  7). instruct in energy conservation/relaxation positions-Patient/family to verbalize understanding of 2 techniques/positions. Plan: cont with POC    At end of treatment, patient in chair with call light and needs within reach.   Timed Code Treatment Minutes: 41   minutes  Total Treatment Time: 41  minutes  Ami Tam OTR/L  Lic # [id number]        If patient discharges from this facility prior to next visit, this note will serve as the Discharge Summary

## 2018-12-15 LAB
BASOPHILS ABSOLUTE: 0 K/UL (ref 0–0.2)
BASOPHILS RELATIVE PERCENT: 0.4 %
EOSINOPHILS ABSOLUTE: 0.2 K/UL (ref 0–0.6)
EOSINOPHILS RELATIVE PERCENT: 3.1 %
HCT VFR BLD CALC: 28.5 % (ref 40.5–52.5)
HEMOGLOBIN: 9.3 G/DL (ref 13.5–17.5)
LYMPHOCYTES ABSOLUTE: 1.5 K/UL (ref 1–5.1)
LYMPHOCYTES RELATIVE PERCENT: 22.4 %
MCH RBC QN AUTO: 27.2 PG (ref 26–34)
MCHC RBC AUTO-ENTMCNC: 32.6 G/DL (ref 31–36)
MCV RBC AUTO: 83.5 FL (ref 80–100)
MONOCYTES ABSOLUTE: 0.8 K/UL (ref 0–1.3)
MONOCYTES RELATIVE PERCENT: 11.7 %
NEUTROPHILS ABSOLUTE: 4.3 K/UL (ref 1.7–7.7)
NEUTROPHILS RELATIVE PERCENT: 62.4 %
PDW BLD-RTO: 22.7 % (ref 12.4–15.4)
PLATELET # BLD: 202 K/UL (ref 135–450)
PMV BLD AUTO: 8.7 FL (ref 5–10.5)
RBC # BLD: 3.41 M/UL (ref 4.2–5.9)
WBC # BLD: 6.9 K/UL (ref 4–11)

## 2018-12-15 PROCEDURE — 94668 MNPJ CHEST WALL SBSQ: CPT

## 2018-12-15 PROCEDURE — 51702 INSERT TEMP BLADDER CATH: CPT

## 2018-12-15 PROCEDURE — 1200000000 HC SEMI PRIVATE

## 2018-12-15 PROCEDURE — 6370000000 HC RX 637 (ALT 250 FOR IP): Performed by: UROLOGY

## 2018-12-15 PROCEDURE — 85025 COMPLETE CBC W/AUTO DIFF WBC: CPT

## 2018-12-15 PROCEDURE — 6370000000 HC RX 637 (ALT 250 FOR IP): Performed by: INTERNAL MEDICINE

## 2018-12-15 PROCEDURE — 2700000000 HC OXYGEN THERAPY PER DAY

## 2018-12-15 PROCEDURE — 94640 AIRWAY INHALATION TREATMENT: CPT

## 2018-12-15 PROCEDURE — 36415 COLL VENOUS BLD VENIPUNCTURE: CPT

## 2018-12-15 PROCEDURE — 94761 N-INVAS EAR/PLS OXIMETRY MLT: CPT

## 2018-12-15 RX ORDER — FINASTERIDE 5 MG/1
5 TABLET, FILM COATED ORAL DAILY
Status: DISCONTINUED | OUTPATIENT
Start: 2018-12-15 | End: 2018-12-17 | Stop reason: HOSPADM

## 2018-12-15 RX ORDER — LEVOFLOXACIN 500 MG/1
500 TABLET, FILM COATED ORAL DAILY
Status: DISCONTINUED | OUTPATIENT
Start: 2018-12-15 | End: 2018-12-16

## 2018-12-15 RX ORDER — LANOLIN ALCOHOL/MO/W.PET/CERES
3 CREAM (GRAM) TOPICAL NIGHTLY PRN
Status: DISCONTINUED | OUTPATIENT
Start: 2018-12-15 | End: 2018-12-17 | Stop reason: HOSPADM

## 2018-12-15 RX ORDER — IPRATROPIUM BROMIDE AND ALBUTEROL SULFATE 2.5; .5 MG/3ML; MG/3ML
1 SOLUTION RESPIRATORY (INHALATION) 4 TIMES DAILY
Status: DISCONTINUED | OUTPATIENT
Start: 2018-12-15 | End: 2018-12-16

## 2018-12-15 RX ADMIN — PANTOPRAZOLE SODIUM 40 MG: 40 GRANULE, DELAYED RELEASE ORAL at 05:14

## 2018-12-15 RX ADMIN — PANTOPRAZOLE SODIUM 40 MG: 40 GRANULE, DELAYED RELEASE ORAL at 17:07

## 2018-12-15 RX ADMIN — SUCRALFATE 1 G: 1 SUSPENSION ORAL at 17:08

## 2018-12-15 RX ADMIN — DILTIAZEM HYDROCHLORIDE 120 MG: 120 CAPSULE, COATED, EXTENDED RELEASE ORAL at 10:01

## 2018-12-15 RX ADMIN — Medication 2 PUFF: at 04:09

## 2018-12-15 RX ADMIN — LEVOFLOXACIN 500 MG: 500 TABLET, FILM COATED ORAL at 18:44

## 2018-12-15 RX ADMIN — SUCRALFATE 1 G: 1 SUSPENSION ORAL at 05:14

## 2018-12-15 RX ADMIN — IPRATROPIUM BROMIDE AND ALBUTEROL SULFATE 1 AMPULE: .5; 3 SOLUTION RESPIRATORY (INHALATION) at 15:11

## 2018-12-15 RX ADMIN — FINASTERIDE 5 MG: 5 TABLET, FILM COATED ORAL at 11:29

## 2018-12-15 RX ADMIN — IPRATROPIUM BROMIDE AND ALBUTEROL SULFATE 1 AMPULE: .5; 3 SOLUTION RESPIRATORY (INHALATION) at 19:55

## 2018-12-15 RX ADMIN — IPRATROPIUM BROMIDE AND ALBUTEROL SULFATE 1 AMPULE: .5; 3 SOLUTION RESPIRATORY (INHALATION) at 07:10

## 2018-12-15 NOTE — FLOWSHEET NOTE
12/14/18 2145   Vital Signs   Temp 98.4 °F (36.9 °C)   Temp Source Oral   Pulse 89   Heart Rate Source Monitor   Resp 18   /72   BP Location Right lower arm   Patient Position Semi fowlers   Level of Consciousness 0   MEWS Score 1   Oxygen Therapy   SpO2 95 %   O2 Device Nasal cannula   O2 Flow Rate (L/min) 2 L/min   Assessment complete, see flowsheets. Pt given 1x extra dose of carafate to help with burning in his stomach and PRN compazine added to help combat pt nausea and stomach upset. Pt denies further needs at this time. Will continue to monitor.   Ellis Arnold RN

## 2018-12-15 NOTE — PLAN OF CARE
Problem: Falls - Risk of:  Goal: Will remain free from falls  Will remain free from falls   Outcome: Ongoing      Problem: Airway Clearance - Ineffective:  Goal: Ability to maintain a clear airway will improve  Ability to maintain a clear airway will improve   Outcome: Ongoing      Problem: Anxiety:  Goal: Level of anxiety will decrease  Level of anxiety will decrease   Outcome: Ongoing      Problem: Discharge Planning:  Goal: Discharged to appropriate level of care  Discharged to appropriate level of care   Outcome: Ongoing      Problem: Airway Clearance - Ineffective:  Goal: Clear lung sounds  Clear lung sounds   Outcome: Ongoing      Problem: Fluid Volume - Deficit:  Goal: Achieves intake and output within specified parameters  Achieves intake and output within specified parameters   Outcome: Ongoing      Problem: Gas Exchange - Impaired:  Goal: Levels of oxygenation will improve  Levels of oxygenation will improve   Outcome: Ongoing      Problem: Tobacco Use:  Goal: Will participate in inpatient tobacco-use cessation counseling  Will participate in inpatient tobacco-use cessation counseling   Outcome: Ongoing      Problem: Risk for Impaired Skin Integrity  Goal: Tissue integrity - skin and mucous membranes  Structural intactness and normal physiological function of skin and  mucous membranes.    Outcome: Ongoing

## 2018-12-16 LAB
BASOPHILS ABSOLUTE: 0 K/UL (ref 0–0.2)
BASOPHILS RELATIVE PERCENT: 0.4 %
EOSINOPHILS ABSOLUTE: 0.2 K/UL (ref 0–0.6)
EOSINOPHILS RELATIVE PERCENT: 4 %
HCT VFR BLD CALC: 28.8 % (ref 40.5–52.5)
HEMOGLOBIN: 9.4 G/DL (ref 13.5–17.5)
LYMPHOCYTES ABSOLUTE: 1.2 K/UL (ref 1–5.1)
LYMPHOCYTES RELATIVE PERCENT: 20.5 %
MCH RBC QN AUTO: 27.5 PG (ref 26–34)
MCHC RBC AUTO-ENTMCNC: 32.7 G/DL (ref 31–36)
MCV RBC AUTO: 83.9 FL (ref 80–100)
MONOCYTES ABSOLUTE: 0.9 K/UL (ref 0–1.3)
MONOCYTES RELATIVE PERCENT: 14.8 %
NEUTROPHILS ABSOLUTE: 3.6 K/UL (ref 1.7–7.7)
NEUTROPHILS RELATIVE PERCENT: 60.3 %
PDW BLD-RTO: 23.2 % (ref 12.4–15.4)
PLATELET # BLD: 213 K/UL (ref 135–450)
PMV BLD AUTO: 8.5 FL (ref 5–10.5)
RBC # BLD: 3.44 M/UL (ref 4.2–5.9)
WBC # BLD: 6 K/UL (ref 4–11)

## 2018-12-16 PROCEDURE — 6370000000 HC RX 637 (ALT 250 FOR IP): Performed by: INTERNAL MEDICINE

## 2018-12-16 PROCEDURE — 94668 MNPJ CHEST WALL SBSQ: CPT

## 2018-12-16 PROCEDURE — 85025 COMPLETE CBC W/AUTO DIFF WBC: CPT

## 2018-12-16 PROCEDURE — 94640 AIRWAY INHALATION TREATMENT: CPT

## 2018-12-16 PROCEDURE — 6370000000 HC RX 637 (ALT 250 FOR IP): Performed by: UROLOGY

## 2018-12-16 PROCEDURE — 1200000000 HC SEMI PRIVATE

## 2018-12-16 PROCEDURE — 36415 COLL VENOUS BLD VENIPUNCTURE: CPT

## 2018-12-16 PROCEDURE — 2700000000 HC OXYGEN THERAPY PER DAY

## 2018-12-16 PROCEDURE — 94664 DEMO&/EVAL PT USE INHALER: CPT

## 2018-12-16 RX ORDER — IPRATROPIUM BROMIDE AND ALBUTEROL SULFATE 2.5; .5 MG/3ML; MG/3ML
1 SOLUTION RESPIRATORY (INHALATION) 3 TIMES DAILY
Status: DISCONTINUED | OUTPATIENT
Start: 2018-12-16 | End: 2018-12-17 | Stop reason: HOSPADM

## 2018-12-16 RX ADMIN — DILTIAZEM HYDROCHLORIDE 120 MG: 120 CAPSULE, COATED, EXTENDED RELEASE ORAL at 08:36

## 2018-12-16 RX ADMIN — IPRATROPIUM BROMIDE AND ALBUTEROL SULFATE 1 AMPULE: .5; 3 SOLUTION RESPIRATORY (INHALATION) at 19:14

## 2018-12-16 RX ADMIN — IPRATROPIUM BROMIDE AND ALBUTEROL SULFATE 1 AMPULE: .5; 3 SOLUTION RESPIRATORY (INHALATION) at 07:02

## 2018-12-16 RX ADMIN — IPRATROPIUM BROMIDE AND ALBUTEROL SULFATE 1 AMPULE: .5; 3 SOLUTION RESPIRATORY (INHALATION) at 10:43

## 2018-12-16 NOTE — PROGRESS NOTES
Writer received call from patient's wife, she states the patient \"is going to rehab, no matter what\" She says he is somewhat agreeable to SNF for rehab. Writer spoke with patient and he states he has no plans of going to SNF at this time.

## 2018-12-16 NOTE — PROGRESS NOTES
IM Progress Note    Admit Date:  12/11/2018  5    Interval history:      S/P EGD Severe peptic stricture in the lower esophagus, 5 cm long - dilated this admit - has been recurrent issue. Subjective:  Mr. Jerrel Hodgkins feels better today. Uses 3l/min at baseline - severe COPD. Quit smoking 1 year ago. He is determined to return home on discharge. He is confused at times but oriented x4 this am.   It is difficult to reason with him. I also discussed his condition with his wife today. I discussed possibility of feeding tube if his upper Gi problem does not improve. Pt does no want it - understandably so. Wife concerned that this has been going on since past May with short lived improvements and overall clinical status decline. Wife open to short term placement and rehab. Pt absolutely against it. Objective:   /69   Pulse 89   Temp 97 °F (36.1 °C) (Axillary)   Resp 18   SpO2 95%       Intake/Output Summary (Last 24 hours) at 12/16/18 1412  Last data filed at 12/16/18 1230   Gross per 24 hour   Intake              540 ml   Output             2975 ml   Net            -2435 ml       Physical Exam:        General: elderly male , ill and frail appearing    Awake, alert and oriented. Appears to be not in any distress  Mucous Membranes:  Pink , anicteric  Neck: No JVD, no carotid bruit, no thyromegaly  Chest:  Clear to auscultation bilaterally, globally diminished breath sounds with very limited air movement. Cardiovascular:  RRR S1S2 heard, no murmurs or gallops  Abdomen:  Soft, undistended, non tender, no organomegaly, BS present  Extremities: No edema or cyanosis.  Distal pulses well felt  Neurological : grossly normal      Medications:   Scheduled Medications:    ipratropium-albuterol  1 ampule Inhalation TID    finasteride  5 mg Oral Daily    levofloxacin  500 mg Oral Daily    pantoprazole sodium  40 mg Oral BID AC    sucralfate  1 g Oral 4 times per day    diltiazem  120 mg

## 2018-12-16 NOTE — PROGRESS NOTES
Physical Therapy  Pt. Adamantly refusing mobility or LE exercises. Continue to follow.     Zulema Burrell, PT #[id number]

## 2018-12-17 VITALS
WEIGHT: 130.4 LBS | DIASTOLIC BLOOD PRESSURE: 72 MMHG | HEIGHT: 65 IN | SYSTOLIC BLOOD PRESSURE: 103 MMHG | RESPIRATION RATE: 16 BRPM | BODY MASS INDEX: 21.73 KG/M2 | HEART RATE: 101 BPM | TEMPERATURE: 98.3 F | OXYGEN SATURATION: 96 %

## 2018-12-17 LAB
BASOPHILS ABSOLUTE: 0 K/UL (ref 0–0.2)
BASOPHILS RELATIVE PERCENT: 0.3 %
EOSINOPHILS ABSOLUTE: 0.1 K/UL (ref 0–0.6)
EOSINOPHILS RELATIVE PERCENT: 2 %
HCT VFR BLD CALC: 31.2 % (ref 40.5–52.5)
HEMOGLOBIN: 10.1 G/DL (ref 13.5–17.5)
LYMPHOCYTES ABSOLUTE: 1.3 K/UL (ref 1–5.1)
LYMPHOCYTES RELATIVE PERCENT: 19 %
MCH RBC QN AUTO: 27 PG (ref 26–34)
MCHC RBC AUTO-ENTMCNC: 32.3 G/DL (ref 31–36)
MCV RBC AUTO: 83.4 FL (ref 80–100)
MONOCYTES ABSOLUTE: 1.3 K/UL (ref 0–1.3)
MONOCYTES RELATIVE PERCENT: 18.3 %
NEUTROPHILS ABSOLUTE: 4.3 K/UL (ref 1.7–7.7)
NEUTROPHILS RELATIVE PERCENT: 60.4 %
PDW BLD-RTO: 23.4 % (ref 12.4–15.4)
PLATELET # BLD: 281 K/UL (ref 135–450)
PMV BLD AUTO: 8.4 FL (ref 5–10.5)
RBC # BLD: 3.74 M/UL (ref 4.2–5.9)
WBC # BLD: 7.1 K/UL (ref 4–11)

## 2018-12-17 PROCEDURE — 99239 HOSP IP/OBS DSCHRG MGMT >30: CPT | Performed by: INTERNAL MEDICINE

## 2018-12-17 PROCEDURE — 6370000000 HC RX 637 (ALT 250 FOR IP): Performed by: INTERNAL MEDICINE

## 2018-12-17 PROCEDURE — 2700000000 HC OXYGEN THERAPY PER DAY

## 2018-12-17 PROCEDURE — 36415 COLL VENOUS BLD VENIPUNCTURE: CPT

## 2018-12-17 PROCEDURE — 85025 COMPLETE CBC W/AUTO DIFF WBC: CPT

## 2018-12-17 PROCEDURE — 94668 MNPJ CHEST WALL SBSQ: CPT

## 2018-12-17 PROCEDURE — 94640 AIRWAY INHALATION TREATMENT: CPT

## 2018-12-17 PROCEDURE — 6370000000 HC RX 637 (ALT 250 FOR IP): Performed by: UROLOGY

## 2018-12-17 PROCEDURE — 6360000002 HC RX W HCPCS: Performed by: INTERNAL MEDICINE

## 2018-12-17 PROCEDURE — 94761 N-INVAS EAR/PLS OXIMETRY MLT: CPT

## 2018-12-17 RX ORDER — FINASTERIDE 5 MG/1
5 TABLET, FILM COATED ORAL DAILY
Qty: 30 TABLET | Refills: 0 | OUTPATIENT
Start: 2018-12-18

## 2018-12-17 RX ORDER — ALBUTEROL SULFATE 2.5 MG/3ML
2.5 SOLUTION RESPIRATORY (INHALATION) EVERY 6 HOURS PRN
Status: DISCONTINUED | OUTPATIENT
Start: 2018-12-17 | End: 2018-12-17 | Stop reason: HOSPADM

## 2018-12-17 RX ADMIN — IPRATROPIUM BROMIDE AND ALBUTEROL SULFATE 1 AMPULE: .5; 3 SOLUTION RESPIRATORY (INHALATION) at 07:25

## 2018-12-17 RX ADMIN — SUCRALFATE 1 G: 1 SUSPENSION ORAL at 00:29

## 2018-12-17 RX ADMIN — ALBUTEROL SULFATE 2.5 MG: 2.5 SOLUTION RESPIRATORY (INHALATION) at 02:52

## 2018-12-17 RX ADMIN — TAMSULOSIN HYDROCHLORIDE 0.4 MG: 0.4 CAPSULE ORAL at 08:11

## 2018-12-17 NOTE — FLOWSHEET NOTE
12/16/18 2044   Vital Signs   Temp 99.7 °F (37.6 °C)   Temp Source Oral   Pulse 90   Heart Rate Source Monitor   Resp 17   /80   BP Location Right upper arm   BP Upper/Lower Upper   Patient Position Semi fowlers   Level of Consciousness 0   MEWS Score 1   Oxygen Therapy   SpO2 90 %   O2 Device Nasal cannula   O2 Flow Rate (L/min) 2 L/min   Assessment complete- see flowsheets. Pt denies needs at this time. Will continue to monitor.   April Prasad RN

## 2018-12-17 NOTE — PROGRESS NOTES
IM Progress Note    Admit Date:  12/11/2018  6    Interval history:      S/P EGD Severe peptic stricture in the lower esophagus, 5 cm long - dilated this admit - has been recurrent issue. Subjective:  Mr. Naranjo Mood feels better today. Uses 3l/min at baseline - severe COPD. Quit smoking 1 year ago. He is determined to return home on discharge. He is confused at times but oriented x4 this am.   It is difficult to reason with him. I also discussed his condition with his wife today. I discussed possibility of feeding tube if his upper Gi problem does not improve. Pt does not want it - understandably so. Wife concerned that this has been going on since past May with short lived improvements and overall clinical status decline. Wife open to short term placement and rehab. Pt absolutely against it. Objective:   /72   Pulse 101   Temp 98.3 °F (36.8 °C) (Oral)   Resp 16   Ht 5' 5\" (1.651 m)   Wt 130 lb 6.4 oz (59.1 kg)   SpO2 96%   BMI 21.70 kg/m²       Intake/Output Summary (Last 24 hours) at 12/17/18 1016  Last data filed at 12/17/18 0955   Gross per 24 hour   Intake              240 ml   Output             1350 ml   Net            -1110 ml       Physical Exam:        General: elderly male , ill and frail appearing    Awake, alert and oriented. Appears to be not in any distress  Mucous Membranes:  Pink , anicteric  Neck: No JVD, no carotid bruit, no thyromegaly  Chest:  Clear to auscultation bilaterally, globally diminished breath sounds with very limited air movement. Cardiovascular:  RRR S1S2 heard, no murmurs or gallops  Abdomen:  Soft, undistended, non tender, no organomegaly, BS present  Extremities: No edema or cyanosis.  Distal pulses well felt  Neurological : grossly normal      Medications:   Scheduled Medications:    ipratropium-albuterol  1 ampule Inhalation TID    finasteride  5 mg Oral Daily    pantoprazole sodium  40 mg Oral BID AC    sucralfate  1 g Oral 4

## 2018-12-17 NOTE — PROGRESS NOTES
Pt refused his am meds and became loud with PCA upon getting his VS.  He is self suctioning with a yanker. Pt is able to make needs known. Will monitor.

## 2018-12-22 ENCOUNTER — APPOINTMENT (OUTPATIENT)
Dept: GENERAL RADIOLOGY | Age: 68
DRG: 243 | End: 2018-12-22
Attending: INTERNAL MEDICINE
Payer: MEDICAID

## 2018-12-22 ENCOUNTER — HOSPITAL ENCOUNTER (INPATIENT)
Age: 68
LOS: 3 days | Discharge: HOME | DRG: 243 | End: 2018-12-25
Attending: INTERNAL MEDICINE | Admitting: INTERNAL MEDICINE
Payer: MEDICAID

## 2018-12-22 PROBLEM — J18.9 HCAP (HEALTHCARE-ASSOCIATED PNEUMONIA): Status: ACTIVE | Noted: 2018-12-22

## 2018-12-22 PROBLEM — A41.9 SEPSIS (HCC): Status: ACTIVE | Noted: 2018-12-22

## 2018-12-22 LAB
GLUCOSE BLD-MCNC: 103 MG/DL (ref 70–99)
GLUCOSE BLD-MCNC: 88 MG/DL (ref 70–99)
GLUCOSE BLD-MCNC: 89 MG/DL (ref 70–99)
GLUCOSE BLD-MCNC: 94 MG/DL (ref 70–99)
PERFORMED ON: ABNORMAL
PERFORMED ON: NORMAL

## 2018-12-22 PROCEDURE — 99223 1ST HOSP IP/OBS HIGH 75: CPT | Performed by: INTERNAL MEDICINE

## 2018-12-22 PROCEDURE — 2000000000 HC ICU R&B

## 2018-12-22 PROCEDURE — 2580000003 HC RX 258: Performed by: INTERNAL MEDICINE

## 2018-12-22 PROCEDURE — 71045 X-RAY EXAM CHEST 1 VIEW: CPT

## 2018-12-22 PROCEDURE — 94762 N-INVAS EAR/PLS OXIMTRY CONT: CPT

## 2018-12-22 RX ORDER — SODIUM CHLORIDE 0.9 % (FLUSH) 0.9 %
10 SYRINGE (ML) INJECTION PRN
Status: DISCONTINUED | OUTPATIENT
Start: 2018-12-22 | End: 2018-12-25 | Stop reason: HOSPADM

## 2018-12-22 RX ORDER — ONDANSETRON 2 MG/ML
4 INJECTION INTRAMUSCULAR; INTRAVENOUS EVERY 6 HOURS PRN
Status: DISCONTINUED | OUTPATIENT
Start: 2018-12-22 | End: 2018-12-25 | Stop reason: HOSPADM

## 2018-12-22 RX ORDER — SODIUM CHLORIDE 0.9 % (FLUSH) 0.9 %
10 SYRINGE (ML) INJECTION EVERY 12 HOURS SCHEDULED
Status: DISCONTINUED | OUTPATIENT
Start: 2018-12-22 | End: 2018-12-25 | Stop reason: HOSPADM

## 2018-12-22 RX ADMIN — Medication 10 ML: at 20:28

## 2018-12-23 LAB
ANION GAP SERPL CALCULATED.3IONS-SCNC: 8 MMOL/L (ref 3–16)
BASOPHILS ABSOLUTE: 0 K/UL (ref 0–0.2)
BASOPHILS RELATIVE PERCENT: 0.4 %
BUN BLDV-MCNC: 9 MG/DL (ref 7–20)
CALCIUM SERPL-MCNC: 9 MG/DL (ref 8.3–10.6)
CHLORIDE BLD-SCNC: 103 MMOL/L (ref 99–110)
CO2: 30 MMOL/L (ref 21–32)
CREAT SERPL-MCNC: <0.5 MG/DL (ref 0.8–1.3)
EOSINOPHILS ABSOLUTE: 0.2 K/UL (ref 0–0.6)
EOSINOPHILS RELATIVE PERCENT: 2.1 %
GFR AFRICAN AMERICAN: >60
GFR NON-AFRICAN AMERICAN: >60
GLUCOSE BLD-MCNC: 100 MG/DL (ref 70–99)
GLUCOSE BLD-MCNC: 86 MG/DL (ref 70–99)
GLUCOSE BLD-MCNC: 87 MG/DL (ref 70–99)
GLUCOSE BLD-MCNC: 89 MG/DL (ref 70–99)
HCT VFR BLD CALC: 30.7 % (ref 40.5–52.5)
HEMOGLOBIN: 10 G/DL (ref 13.5–17.5)
LYMPHOCYTES ABSOLUTE: 2.2 K/UL (ref 1–5.1)
LYMPHOCYTES RELATIVE PERCENT: 23.4 %
MAGNESIUM: 1.9 MG/DL (ref 1.8–2.4)
MCH RBC QN AUTO: 27.3 PG (ref 26–34)
MCHC RBC AUTO-ENTMCNC: 32.6 G/DL (ref 31–36)
MCV RBC AUTO: 83.9 FL (ref 80–100)
MONOCYTES ABSOLUTE: 0.8 K/UL (ref 0–1.3)
MONOCYTES RELATIVE PERCENT: 9 %
NEUTROPHILS ABSOLUTE: 6 K/UL (ref 1.7–7.7)
NEUTROPHILS RELATIVE PERCENT: 65.1 %
PDW BLD-RTO: 23.1 % (ref 12.4–15.4)
PERFORMED ON: ABNORMAL
PERFORMED ON: NORMAL
PERFORMED ON: NORMAL
PLATELET # BLD: 387 K/UL (ref 135–450)
PMV BLD AUTO: 7.8 FL (ref 5–10.5)
POTASSIUM REFLEX MAGNESIUM: 3.3 MMOL/L (ref 3.5–5.1)
RBC # BLD: 3.66 M/UL (ref 4.2–5.9)
SODIUM BLD-SCNC: 141 MMOL/L (ref 136–145)
WBC # BLD: 9.3 K/UL (ref 4–11)

## 2018-12-23 PROCEDURE — C9113 INJ PANTOPRAZOLE SODIUM, VIA: HCPCS | Performed by: INTERNAL MEDICINE

## 2018-12-23 PROCEDURE — 7100000010 HC PHASE II RECOVERY - FIRST 15 MIN: Performed by: INTERNAL MEDICINE

## 2018-12-23 PROCEDURE — 3609012900 HC EGD FOREIGN BODY REMOVAL: Performed by: INTERNAL MEDICINE

## 2018-12-23 PROCEDURE — 0D738ZZ DILATION OF LOWER ESOPHAGUS, VIA NATURAL OR ARTIFICIAL OPENING ENDOSCOPIC: ICD-10-PCS | Performed by: INTERNAL MEDICINE

## 2018-12-23 PROCEDURE — 2580000003 HC RX 258: Performed by: INTERNAL MEDICINE

## 2018-12-23 PROCEDURE — 6370000000 HC RX 637 (ALT 250 FOR IP): Performed by: INTERNAL MEDICINE

## 2018-12-23 PROCEDURE — G0009 ADMIN PNEUMOCOCCAL VACCINE: HCPCS | Performed by: INTERNAL MEDICINE

## 2018-12-23 PROCEDURE — 94761 N-INVAS EAR/PLS OXIMETRY MLT: CPT

## 2018-12-23 PROCEDURE — 36415 COLL VENOUS BLD VENIPUNCTURE: CPT

## 2018-12-23 PROCEDURE — 90670 PCV13 VACCINE IM: CPT | Performed by: INTERNAL MEDICINE

## 2018-12-23 PROCEDURE — C1773 RET DEV, INSERTABLE: HCPCS | Performed by: INTERNAL MEDICINE

## 2018-12-23 PROCEDURE — 7100000011 HC PHASE II RECOVERY - ADDTL 15 MIN: Performed by: INTERNAL MEDICINE

## 2018-12-23 PROCEDURE — 85025 COMPLETE CBC W/AUTO DIFF WBC: CPT

## 2018-12-23 PROCEDURE — 99233 SBSQ HOSP IP/OBS HIGH 50: CPT | Performed by: INTERNAL MEDICINE

## 2018-12-23 PROCEDURE — 99152 MOD SED SAME PHYS/QHP 5/>YRS: CPT | Performed by: INTERNAL MEDICINE

## 2018-12-23 PROCEDURE — 99153 MOD SED SAME PHYS/QHP EA: CPT | Performed by: INTERNAL MEDICINE

## 2018-12-23 PROCEDURE — 2700000000 HC OXYGEN THERAPY PER DAY

## 2018-12-23 PROCEDURE — 2709999900 HC NON-CHARGEABLE SUPPLY: Performed by: INTERNAL MEDICINE

## 2018-12-23 PROCEDURE — 83735 ASSAY OF MAGNESIUM: CPT

## 2018-12-23 PROCEDURE — 80048 BASIC METABOLIC PNL TOTAL CA: CPT

## 2018-12-23 PROCEDURE — 90686 IIV4 VACC NO PRSV 0.5 ML IM: CPT | Performed by: INTERNAL MEDICINE

## 2018-12-23 PROCEDURE — 94640 AIRWAY INHALATION TREATMENT: CPT

## 2018-12-23 PROCEDURE — 6360000002 HC RX W HCPCS: Performed by: INTERNAL MEDICINE

## 2018-12-23 PROCEDURE — 1200000000 HC SEMI PRIVATE

## 2018-12-23 PROCEDURE — G0008 ADMIN INFLUENZA VIRUS VAC: HCPCS | Performed by: INTERNAL MEDICINE

## 2018-12-23 PROCEDURE — 3609012500 HC EGD DILATION BALLOON: Performed by: INTERNAL MEDICINE

## 2018-12-23 PROCEDURE — 94664 DEMO&/EVAL PT USE INHALER: CPT

## 2018-12-23 PROCEDURE — C1726 CATH, BAL DIL, NON-VASCULAR: HCPCS | Performed by: INTERNAL MEDICINE

## 2018-12-23 RX ORDER — PANTOPRAZOLE SODIUM 40 MG/10ML
40 INJECTION, POWDER, LYOPHILIZED, FOR SOLUTION INTRAVENOUS 2 TIMES DAILY
Status: DISCONTINUED | OUTPATIENT
Start: 2018-12-23 | End: 2018-12-25 | Stop reason: HOSPADM

## 2018-12-23 RX ORDER — IPRATROPIUM BROMIDE AND ALBUTEROL SULFATE 2.5; .5 MG/3ML; MG/3ML
1 SOLUTION RESPIRATORY (INHALATION) 2 TIMES DAILY
Status: DISCONTINUED | OUTPATIENT
Start: 2018-12-23 | End: 2018-12-25 | Stop reason: HOSPADM

## 2018-12-23 RX ORDER — DEXTROSE AND SODIUM CHLORIDE 5; .45 G/100ML; G/100ML
INJECTION, SOLUTION INTRAVENOUS CONTINUOUS
Status: DISCONTINUED | OUTPATIENT
Start: 2018-12-23 | End: 2018-12-25 | Stop reason: HOSPADM

## 2018-12-23 RX ORDER — ASPIRIN 81 MG/1
81 TABLET, CHEWABLE ORAL DAILY
Status: DISCONTINUED | OUTPATIENT
Start: 2018-12-23 | End: 2018-12-25 | Stop reason: HOSPADM

## 2018-12-23 RX ORDER — ATORVASTATIN CALCIUM 10 MG/1
10 TABLET, FILM COATED ORAL NIGHTLY
Status: DISCONTINUED | OUTPATIENT
Start: 2018-12-23 | End: 2018-12-25 | Stop reason: HOSPADM

## 2018-12-23 RX ORDER — FENTANYL CITRATE 50 UG/ML
INJECTION, SOLUTION INTRAMUSCULAR; INTRAVENOUS PRN
Status: DISCONTINUED | OUTPATIENT
Start: 2018-12-23 | End: 2018-12-25 | Stop reason: HOSPADM

## 2018-12-23 RX ORDER — SUCRALFATE 1 G/1
1 TABLET ORAL EVERY 6 HOURS SCHEDULED
Status: DISCONTINUED | OUTPATIENT
Start: 2018-12-23 | End: 2018-12-25 | Stop reason: HOSPADM

## 2018-12-23 RX ORDER — IPRATROPIUM BROMIDE AND ALBUTEROL SULFATE 2.5; .5 MG/3ML; MG/3ML
1 SOLUTION RESPIRATORY (INHALATION) EVERY 4 HOURS PRN
Status: DISCONTINUED | OUTPATIENT
Start: 2018-12-23 | End: 2018-12-25 | Stop reason: HOSPADM

## 2018-12-23 RX ORDER — RIVASTIGMINE 4.6 MG/24H
1 PATCH, EXTENDED RELEASE TRANSDERMAL DAILY
Status: DISCONTINUED | OUTPATIENT
Start: 2018-12-23 | End: 2018-12-25 | Stop reason: HOSPADM

## 2018-12-23 RX ORDER — LORAZEPAM 0.5 MG/1
0.5 TABLET ORAL NIGHTLY PRN
Status: DISCONTINUED | OUTPATIENT
Start: 2018-12-23 | End: 2018-12-25 | Stop reason: HOSPADM

## 2018-12-23 RX ORDER — TAMSULOSIN HYDROCHLORIDE 0.4 MG/1
0.4 CAPSULE ORAL DAILY
Status: DISCONTINUED | OUTPATIENT
Start: 2018-12-23 | End: 2018-12-25 | Stop reason: HOSPADM

## 2018-12-23 RX ORDER — CITALOPRAM 20 MG/1
10 TABLET ORAL DAILY
Status: DISCONTINUED | OUTPATIENT
Start: 2018-12-23 | End: 2018-12-25 | Stop reason: HOSPADM

## 2018-12-23 RX ORDER — MIDAZOLAM HYDROCHLORIDE 5 MG/ML
INJECTION INTRAMUSCULAR; INTRAVENOUS PRN
Status: DISCONTINUED | OUTPATIENT
Start: 2018-12-23 | End: 2018-12-25 | Stop reason: HOSPADM

## 2018-12-23 RX ORDER — POTASSIUM CHLORIDE 750 MG/1
10 TABLET, EXTENDED RELEASE ORAL DAILY
Status: DISCONTINUED | OUTPATIENT
Start: 2018-12-23 | End: 2018-12-25 | Stop reason: HOSPADM

## 2018-12-23 RX ORDER — POTASSIUM CHLORIDE 7.45 MG/ML
10 INJECTION INTRAVENOUS
Status: COMPLETED | OUTPATIENT
Start: 2018-12-23 | End: 2018-12-23

## 2018-12-23 RX ADMIN — DEXTROSE AND SODIUM CHLORIDE: 5; 450 INJECTION, SOLUTION INTRAVENOUS at 21:25

## 2018-12-23 RX ADMIN — POTASSIUM CHLORIDE 10 MEQ: 7.46 INJECTION, SOLUTION INTRAVENOUS at 09:01

## 2018-12-23 RX ADMIN — PNEUMOCOCCAL 13-VALENT CONJUGATE VACCINE 0.5 ML: 2.2; 2.2; 2.2; 2.2; 2.2; 4.4; 2.2; 2.2; 2.2; 2.2; 2.2; 2.2; 2.2 INJECTION, SUSPENSION INTRAMUSCULAR at 08:33

## 2018-12-23 RX ADMIN — Medication 10 ML: at 08:34

## 2018-12-23 RX ADMIN — POTASSIUM CHLORIDE 10 MEQ: 7.46 INJECTION, SOLUTION INTRAVENOUS at 11:47

## 2018-12-23 RX ADMIN — INFLUENZA A VIRUS A/MICHIGAN/45/2015 X-275 (H1N1) ANTIGEN (FORMALDEHYDE INACTIVATED), INFLUENZA A VIRUS A/SINGAPORE/INFIMH-16-0019/2016 IVR-186 (H3N2) ANTIGEN (FORMALDEHYDE INACTIVATED), INFLUENZA B VIRUS B/PHUKET/3073/2013 ANTIGEN (FORMALDEHYDE INACTIVATED), AND INFLUENZA B VIRUS B/MARYLAND/15/2016 BX-69A ANTIGEN (FORMALDEHYDE INACTIVATED) 0.5 ML: 15; 15; 15; 15 INJECTION, SUSPENSION INTRAMUSCULAR at 08:32

## 2018-12-23 RX ADMIN — IPRATROPIUM BROMIDE AND ALBUTEROL SULFATE 1 AMPULE: .5; 3 SOLUTION RESPIRATORY (INHALATION) at 19:42

## 2018-12-23 RX ADMIN — DEXTROSE AND SODIUM CHLORIDE: 5; 450 INJECTION, SOLUTION INTRAVENOUS at 08:59

## 2018-12-23 RX ADMIN — PANTOPRAZOLE SODIUM 40 MG: 40 INJECTION, POWDER, FOR SOLUTION INTRAVENOUS at 21:07

## 2018-12-23 RX ADMIN — POTASSIUM CHLORIDE 10 MEQ: 7.46 INJECTION, SOLUTION INTRAVENOUS at 10:53

## 2018-12-23 RX ADMIN — Medication 10 ML: at 21:07

## 2018-12-23 RX ADMIN — POTASSIUM CHLORIDE 10 MEQ: 7.46 INJECTION, SOLUTION INTRAVENOUS at 13:00

## 2018-12-23 RX ADMIN — PANTOPRAZOLE SODIUM 40 MG: 40 INJECTION, POWDER, FOR SOLUTION INTRAVENOUS at 13:37

## 2018-12-23 RX ADMIN — ATORVASTATIN CALCIUM 10 MG: 10 TABLET, FILM COATED ORAL at 21:07

## 2018-12-23 ASSESSMENT — PAIN SCALES - GENERAL: PAINLEVEL_OUTOF10: 0

## 2018-12-23 NOTE — CARE COORDINATION
Case Management Assessment  Initial Evaluation    Date/Time of Evaluation: 12/23/2018 4:31 PM  Assessment Completed by: Serjio Zabala    Patient Name: Nata Haro  YOB: 1950  Diagnosis: Sepsis ([address] [A41.9]  Date / Time: 12/22/2018 11:28 AM  Admission status/Date:12/22/2018 inpt  Chart Reviewed: Yes      Patient Interviewed: Yes   Family Interviewed:  No      Hospitalization in the last 30 days:  Yes    Contacts  :     Relationship to Patient:   Phone Number:    Alternate Contact:     Relationship to Patient:     Phone Number:      Current PCP   [address]: Spouse/Significant Other  Transportation: self    Meal Preparation: spouse    Housing  Home Environment: lives with spouse in mobile home  Steps: 3  Plans to Return to Present Housing: Yes  Other Identified Issues:     Janeth Millan 79  Currently active with [address] : Yes - pt unsure of agency     Passport/Waiver : No  Passport/Waiver Services:     Durable Medical Equipment   DME Provider: [facility] O2/HHN  Equipment: walker, bsc, cane    Has Home O2 in place on admit:  Yes  Informed of need to bring portable home O2 tank on day of discharge for nursing to connect prior to leaving:   Yes  Verbalized agreement/Understanding:   Yes    Community Service Affiliation  Dialysis:  No  Outpatient PT/OT: No    Cancer Center: No     CHF Clinic: No     Pulmonary Rehab: No  Pain Clinic: No  Community Mental Health: No    Wound Clinic: No     Other:     DISCHARGE PLAN: Chart reviewed and role of dcp explained. Pt lives with spouse in mobile home and plans to return. Pt states he still drives. Active with [facility] for O2/HHN. Pt unsure of [facility] agency.  +eCOC Following

## 2018-12-23 NOTE — PROGRESS NOTES
Patient reassessed post EGD. Still a little drowsy from sedation. A&O still. Dental soft diet to be started per Dr. Martin Chi. Transfer to . Assessment unchanged.      Electronically signed by Tiffanie Trivedi RN on 12/23/2018 at 11:09 AM

## 2018-12-23 NOTE — CONSULTS
History and Physical / Pre-Sedation Assessment    Patient:  Scarlett Woodard   :   1950     Intended Procedure:  EGD    HPI: 81-year-old male with a history of COPD and dementia, transferred from [facility] for sepsis and dysphagia. EGD with severe stricture s/p balloon dilation last week. He was scheduled for repeat EGD last Friday but failed to follow up. Will repeat EGD for dilation    Nurses notes reviewed and agreed. Medications reviewed  Allergies: Allergies   Allergen Reactions    Heparin      Pt was HIT positive in        Physical Exam:  Vital Signs: /86   Pulse 88   Temp 97.9 °F (36.6 °C) (Oral)   Resp 19   Ht 5' 5\" (1.651 m)   Wt 87 lb 8 oz (39.7 kg)   SpO2 96%   BMI 14.56 kg/m²    Airway: Mallampati: II (soft palate, uvula, fauces visible)  Pulmonary:Normal  Cardiac:Normal  Abdomen:Normal    Pre-Procedure Assessment / Plan:  ASA: Class 3 - A patient with severe systemic disease that limits activity but is not incapacitating  Level of Sedation Plan: Moderate sedation  Post Procedure plan: Return to same level of care    I assessed the patient and find that the patient is in satisfactory condition to proceed with the planned procedure and sedation plan. I have explained the risk, benefits, and alternatives to the procedure; the patient understands and agrees to proceed.        Yvrose Stephens  2018
file.    SOCIAL HISTORY:   reports that he has been smoking Cigarettes. He has a 50.00 pack-year smoking history. He has never used smokeless tobacco.    Scheduled Meds:   influenza virus vaccine  0.5 mL Intramuscular Once    [START ON 12/23/2018] pneumococcal 13-valent conjugate  0.5 mL Intramuscular Once     Continuous Infusions:    PRN Meds:      ALLERGIES:  Patient is allergic to heparin. REVIEW OF SYSTEMS:  Constitutional: Negative for fever  HENT: Negative for sore throat  Eyes: Negative for redness   Respiratory: +for dyspnea, + cough  Cardiovascular: Negative for chest pain  Gastrointestinal: Negative for vomiting, diarrhea. + dysphagia  Genitourinary: Negative for hematuria   Musculoskeletal: Negative for arthralgias   Skin: Negative for rash  Neurological: Negative for syncope  Hematological: Negative for adenopathy  Psychiatric/Behavorial: Negative for anxiety    PHYSICAL EXAM:  Blood pressure 116/64, pulse 92, temperature 97.7 °F (36.5 °C), temperature source Oral, resp. rate 16, height 5' 5\" (1.651 m), weight 85 lb 14.4 oz (39 kg), SpO2 99 %.' on 3l NC  Gen: No distress. Normocephalic, atraumatic. Cachectic  Eyes: PERRL. No sclera icterus. No conjunctival injection. ENT: No discharge. Pharynx clear. Neck: Trachea midline. No obvious mass. Resp: No accessory muscle use. No crackles. Few wheezes. No rhonchi. No dullness on percussion. CV: Regular rate. Regular rhythm. No murmur or rub. No edema. Peripheral pulses are 2+. Capillary refill is less than 3 seconds. GI: Non-tender. Non-distended. No hernia. Skin: Warm and dry. No nodule on exposed extremities. Lymph: No cervical LAD. No supraclavicular LAD. M/S: No cyanosis. No joint deformity. No clubbing. Neuro: Awake. Alert. Moves all four extremities. Conversant. LABS:  CBC: No results for input(s): WBC, HGB, HCT, MCV, PLT in the last 72 hours.   BMP: No results for input(s): NA, K, CL, CO2, PHOS, BUN, CREATININE in the last 72

## 2018-12-23 NOTE — PROGRESS NOTES
with spacer when the following criteria are met:  a. Alert and cooperative     b. HR < 140 bpm  c. RR < 30 bpm                d. Can demonstrate a 2-3 second inspiratory hold  4. Bronchodilators will be administered via Hand Held Nebulizer [facility] to patients when ANY of the following criteria are met  a. Incognizant or uncooperative          b. Patients treated with HHN at Home        c. Unable to demonstrate proper use of MDI with spacer     d. RR > 30 bpm   5. Bronchodilators will be delivered via Metered Dose Inhaler (MDI), HHN, Aerogen to intubated patients on mechanical ventilation. 6. Inhalation medication orders will be delivered and/or substituted as outlined below. Aerosolized Medications Ordering and Administration Guidelines:    1. All Medications will be ordered by a physician, and their frequency and/or modality will be adjusted as defined by the patients Respiratory Severity Index (RSI) score. 2. If the patient does not have documented COPD, consider discontinuing anticholinergics when RSI is less than 9.  3. If the bronchospasm worsens (increased RSI), then the bronchodilator frequency can be increased to a maximum of every 4 hours. If greater than every 4 hours is required, the physician will be contacted. 4. If the bronchospasm improves, the frequency of the bronchodilator can be decreased, based on the patient's RSI, but not less than home treatment regimen frequency. 5. Bronchodilator(s) will be discontinued if patient has a RSI less than 9 and has received no scheduled or as needed treatment for 72  Hrs. Patients Ordered on a Mucolytic Agent:    1. Must always be administered with a bronchodilator. 2. Discontinue if patient experiences worsened bronchospasm, or secretions have lessened to the point that the patient is able to clear them with a cough. Anti-inflammatory and Combination Medications:    1.  If the patient lacks prior history of lung disease, is not using inhaled anti-inflammatory medication at home, and lacks wheezing by examination or by history for at least 24 hours, contact physician for possible discontinuation.

## 2018-12-23 NOTE — PROGRESS NOTES
Shift handoff given to Sheree moreno, [facility]. No s/s of distress noted. Pt denies any needs at this time. Care transferred.

## 2018-12-24 PROBLEM — E44.0 MODERATE PROTEIN-CALORIE MALNUTRITION (HCC): Chronic | Status: ACTIVE | Noted: 2018-12-24

## 2018-12-24 PROCEDURE — 2700000000 HC OXYGEN THERAPY PER DAY

## 2018-12-24 PROCEDURE — C9113 INJ PANTOPRAZOLE SODIUM, VIA: HCPCS | Performed by: INTERNAL MEDICINE

## 2018-12-24 PROCEDURE — 99232 SBSQ HOSP IP/OBS MODERATE 35: CPT | Performed by: INTERNAL MEDICINE

## 2018-12-24 PROCEDURE — 97166 OT EVAL MOD COMPLEX 45 MIN: CPT

## 2018-12-24 PROCEDURE — 1200000000 HC SEMI PRIVATE

## 2018-12-24 PROCEDURE — G8988 SELF CARE GOAL STATUS: HCPCS

## 2018-12-24 PROCEDURE — 2580000003 HC RX 258: Performed by: INTERNAL MEDICINE

## 2018-12-24 PROCEDURE — 94640 AIRWAY INHALATION TREATMENT: CPT

## 2018-12-24 PROCEDURE — G8979 MOBILITY GOAL STATUS: HCPCS

## 2018-12-24 PROCEDURE — 6370000000 HC RX 637 (ALT 250 FOR IP): Performed by: INTERNAL MEDICINE

## 2018-12-24 PROCEDURE — 97530 THERAPEUTIC ACTIVITIES: CPT

## 2018-12-24 PROCEDURE — 97535 SELF CARE MNGMENT TRAINING: CPT

## 2018-12-24 PROCEDURE — 6360000002 HC RX W HCPCS: Performed by: INTERNAL MEDICINE

## 2018-12-24 PROCEDURE — G8978 MOBILITY CURRENT STATUS: HCPCS

## 2018-12-24 PROCEDURE — 97162 PT EVAL MOD COMPLEX 30 MIN: CPT

## 2018-12-24 PROCEDURE — 97116 GAIT TRAINING THERAPY: CPT

## 2018-12-24 PROCEDURE — 94761 N-INVAS EAR/PLS OXIMETRY MLT: CPT

## 2018-12-24 PROCEDURE — G8987 SELF CARE CURRENT STATUS: HCPCS

## 2018-12-24 RX ADMIN — Medication 10 ML: at 08:06

## 2018-12-24 RX ADMIN — DEXTROSE AND SODIUM CHLORIDE: 5; 450 INJECTION, SOLUTION INTRAVENOUS at 12:52

## 2018-12-24 RX ADMIN — PANTOPRAZOLE SODIUM 40 MG: 40 INJECTION, POWDER, FOR SOLUTION INTRAVENOUS at 08:06

## 2018-12-24 RX ADMIN — PANTOPRAZOLE SODIUM 40 MG: 40 INJECTION, POWDER, FOR SOLUTION INTRAVENOUS at 22:06

## 2018-12-24 RX ADMIN — IPRATROPIUM BROMIDE AND ALBUTEROL SULFATE 1 AMPULE: .5; 3 SOLUTION RESPIRATORY (INHALATION) at 19:33

## 2018-12-24 RX ADMIN — Medication 10 ML: at 22:06

## 2018-12-24 RX ADMIN — IPRATROPIUM BROMIDE AND ALBUTEROL SULFATE 1 AMPULE: .5; 3 SOLUTION RESPIRATORY (INHALATION) at 07:39

## 2018-12-24 RX ADMIN — DEXTROSE AND SODIUM CHLORIDE: 5; 450 INJECTION, SOLUTION INTRAVENOUS at 22:15

## 2018-12-24 ASSESSMENT — PAIN DESCRIPTION - ORIENTATION: ORIENTATION: MID;LOWER

## 2018-12-24 ASSESSMENT — PAIN DESCRIPTION - LOCATION: LOCATION: BACK

## 2018-12-24 ASSESSMENT — PAIN DESCRIPTION - FREQUENCY: FREQUENCY: CONTINUOUS

## 2018-12-24 ASSESSMENT — PAIN DESCRIPTION - DESCRIPTORS: DESCRIPTORS: ACHING;DISCOMFORT

## 2018-12-24 ASSESSMENT — PAIN DESCRIPTION - ONSET: ONSET: ON-GOING

## 2018-12-24 ASSESSMENT — PAIN DESCRIPTION - PAIN TYPE: TYPE: CHRONIC PAIN

## 2018-12-24 ASSESSMENT — PAIN SCALES - GENERAL: PAINLEVEL_OUTOF10: 4

## 2018-12-24 ASSESSMENT — PAIN DESCRIPTION - PROGRESSION: CLINICAL_PROGRESSION: NOT CHANGED

## 2018-12-24 NOTE — PROGRESS NOTES
Took PM pill w/o diff. Patient is now able to hear normal conversation. He is dozing off to sleep at times. Cooperative. Requested milk to drink; suggested something else to prevent thick secretions in his throat for now. Insisted on milk and 1% milk was given. See PM shift assessment. Call light in reach. Will continue to monitor.

## 2018-12-24 NOTE — PROGRESS NOTES
Valaria Form in the last 72 hours. Coagulation: No results found for: INR, APTT  Cardiac markers:   Lab Results   Component Value Date    TROPONINI <0.01 11/14/2016         Lab Results   Component Value Date    ALT <5 (L) 12/11/2018    AST 9 (L) 12/11/2018    ALKPHOS 56 12/11/2018    BILITOT 0.4 12/11/2018       cxr-Stable chest demonstrating no acute abnormalities.  Emphysematous changes  evident in the upper lobes    ASSESMENT & PLAN:     Dysphagia/ Esophageal stricture     - transferred from [facility] for chest pain and difficulty swallowing solids once again   - hx of Esophageal dilatation on 11/13/2018, 12/12  - EGD with peptic esophageal stricture, s.p dilatation once again 12/23  - resumed diet and tolerating well.  Need periodic EGD with dilatation  - continue PPI with acid suppression  - might need PEG placement for ongoing weight loss and dysphagia issues     pnuemonia  - recently been treated for right basilar pneumonia, now improving on xray  - repeat xray to reevaluate for new ASD was neg  - hold abx  - pulmonary consulted      COPD - stable  - resume HHN       Chronic Hypoxic Respiratory Failure  - wears 2-3L NC at baseline  - stable on 3L       Normocytic Anemia  - normal Iron studies, B12, folate  - monitor CBC     DVT Prophylaxis: SCDs, 2/2 allergies to heparin  Diet: soft diet  Code Status: full        Up in chair  Ambulate  Dc planning      Sisi Kendrick MD 12/24/2018 7:42 AM

## 2018-12-24 NOTE — CARE COORDINATION
INTERDISCIPLINARY PLAN OF CARE CONFERENCE    Date/Time: 12/24/2018 8:40 AM  Completed by: Anurag Patel Case Management      Patient Name:  Nata Haro  YOB: 1950  Admitting Diagnosis: Sepsis ([address] [A41.9]     Admit Date/Time:  12/22/2018 11:28 AM    Chart reviewed. Interdisciplinary team met to discuss patient progress and discharge plans. Disciplines included Case Management, Nursing, and Dietitian. Current Status:stable    PT/OT recommendation:tbd    Anticipated Discharge Date: tbd  Expected D/C Disposition:  Home  Confirmed plan with patient and/or family Yes  Discharge Plan Comments: pt from mobile home with spouse and plan return. Pt active with [facility] for home O2. Pt states he still drives. Pt states has SN, however, unsure of agency name. Following. Home O2 in place on admit: Yes  Pt informed of need to bring portable home O2 tank on day of discharge for nursing to connect prior to leaving:  Yes  Verbalized agreement/Understanding:   Yes

## 2018-12-24 NOTE — PROGRESS NOTES
Nutrition Assessment    Type and Reason for Visit: Initial, Positive Nutrition Screen (+ screen for difficulty chewing and/or swallowing and weight loss)    Nutrition Recommendations:   1. Continue general, dental soft diet order. 2. Added Ensure Enlive with breakfast and dinner meals. 3. Monitor appetite, po intake, and ability to chew/swallow safely on current diet consistencies. 4. Monitor for in-patient weight changes. 5. Monitor nutrition-related lab values and bowel function. Nutrition Assessment: patient is malnourished AEB decreased po intake and weight loss AND he is at risk for further compromise d/t severe esophageal stricture in distal esophagus, food impaction in proximal esophagus, and hiatal hernia; will continue general, dental soft diet order at this time and add Ensure Enlive with breakfast and dinner meals     Malnutrition Assessment:  · Malnutrition Status: Meets the criteria for moderate malnutrition  · Context: Chronic illness  · Findings of the 6 clinical characteristics of malnutrition (Minimum of 2 out of 6 clinical characteristics is required to make the diagnosis of moderate or severe Protein Calorie Malnutrition based on AND/ASPEN Guidelines):  1. Energy Intake-Less than 75% of estimated energy requirement for greater than 7 days, greater than 7 days    2. Weight Loss-20% loss or greater (- 43# or 33% weight loss ), in 1 month  3. Fat Loss-Unable to assess,    4. Muscle Loss-Moderate muscle mass loss, Clavicles (pectoralis and deltoids)  5. Fluid Accumulation-No significant fluid accumulation,    6.  Strength-Not measured    Nutrition Risk Level: Moderate    Nutrient Needs:  · Estimated Daily Total Kcal: 1400 - 1600 kcals based on 35-40 kcals/kg/CBW  · Estimated Daily Protein (g): 52 - 60 g protein based on 1.3-1.5 g/kg/CBW  · Estimated Daily Total Fluid (ml/day): 1400 - 1600 ml     Nutrition Diagnosis:   · Problem:  Moderate malnutrition  · Etiology: related to

## 2018-12-24 NOTE — PLAN OF CARE
Problem: Falls - Risk of:  Goal: Will remain free from falls  Will remain free from falls   Outcome: Ongoing    Goal: Absence of physical injury  Absence of physical injury   Outcome: Ongoing      Problem: Risk for Impaired Skin Integrity  Goal: Tissue integrity - skin and mucous membranes  Structural intactness and normal physiological function of skin and  mucous membranes.    Outcome: Ongoing      Problem: Discharge Planning:  Goal: Discharged to appropriate level of care  Discharged to appropriate level of care   Outcome: Ongoing    Goal: Participates in care planning  Participates in care planning   Outcome: Ongoing      Problem: Airway Clearance - Ineffective:  Goal: Clear lung sounds  Clear lung sounds   Outcome: Ongoing    Goal: Ability to maintain a clear airway will improve  Ability to maintain a clear airway will improve   Outcome: Ongoing      Problem: Fluid Volume - Deficit:  Goal: Achieves intake and output within specified parameters  Achieves intake and output within specified parameters   Outcome: Ongoing      Problem: Gas Exchange - Impaired:  Goal: Levels of oxygenation will improve  Levels of oxygenation will improve   Outcome: Ongoing      Problem: Hyperthermia:  Goal: Ability to maintain a body temperature in the normal range will improve  Ability to maintain a body temperature in the normal range will improve   Outcome: Ongoing      Problem: Tobacco Use:  Goal: Will participate in inpatient tobacco-use cessation counseling  Will participate in inpatient tobacco-use cessation counseling   Outcome: Ongoing      Problem: Pain:  Goal: Pain level will decrease  Pain level will decrease   Outcome: Ongoing    Goal: Control of acute pain  Control of acute pain   Outcome: Ongoing    Goal: Control of chronic pain  Control of chronic pain   Outcome: Ongoing

## 2018-12-24 NOTE — PROGRESS NOTES
AM assessment complete. Gave am meds due see mar. Refused po meds. A/O to self. Pain level 4/10 located lower back. Call light within reach.

## 2018-12-24 NOTE — PROGRESS NOTES
Tone  WNL    Balance  Static Sitting: good  Dynamic Sitting: good  Static Standing: good with RW  Dynamic Standing: good with Rw    Bed mobility    Supine to sit: independent,hob flat, no handrail  Sit to supine: indep  Scooting to head of bed: DNT  Scooting in sitting: Indep  Rolling: DNT    Transfers    Sit to stand: SBA, VC to push up from EOB to RW  Stand to sit:Sup  Bed to chair: DNT    Gait    Ambulated with RW and '   Gait deviations included: fast pace, step through gait pattern, no LOB,steady. Assist to manage portable oxygen tank and IV    Activity Tolerance   Spo2 97%  HR 80  O2 2 L, increased to 3 L during activity    Positioning Needs   In bed, declined to get OOB to chair, all needs in reach, alarm activated  Exercises Initiated    None at this session  Patient/Family Education     G-CODEs:  PT G-Codes  Functional Limitation: Mobility: Walking and moving around  Mobility: Walking and Moving Around Current Status (): At least 20 percent but less than 40 percent impaired, limited or restricted  Mobility: Walking and Moving Around Goal Status (): At least 1 percent but less than 20 percent impaired, limited or restricted      Assessment of Deficits  Pt demonstrated decreased activity tolerance and would benefit from ongoing PT while an inpatient , with follow-up with home PT. [place] would benefit from a RW (vs standard walker), but is currently declining this. Pt. Limited during evaluation by participation    Goal(s) : To be met in 3 visits:  1). Independent with LE Ex x 10 reps    To be met in 6 visits:    2). Sit to/from stand MI to RW  3). Bed to chair with RW and MI  4). Gait 200' with LRAd and SBA  5). Tolerate B LE exercises 10 reps  6). Up and Down steps x 2+2 with CGA    Rehabilitation Potential   Good for goals listed above.     Strengths for achieving goals include: PLOL  Barriers to achieving goals include: Limited participation    Plan    To be seen 2-3x/week while

## 2018-12-25 VITALS
HEART RATE: 81 BPM | HEIGHT: 65 IN | BODY MASS INDEX: 14.58 KG/M2 | OXYGEN SATURATION: 96 % | TEMPERATURE: 98.2 F | RESPIRATION RATE: 18 BRPM | WEIGHT: 87.5 LBS | DIASTOLIC BLOOD PRESSURE: 72 MMHG | SYSTOLIC BLOOD PRESSURE: 108 MMHG

## 2018-12-25 PROCEDURE — 94640 AIRWAY INHALATION TREATMENT: CPT

## 2018-12-25 PROCEDURE — 6370000000 HC RX 637 (ALT 250 FOR IP): Performed by: INTERNAL MEDICINE

## 2018-12-25 PROCEDURE — C9113 INJ PANTOPRAZOLE SODIUM, VIA: HCPCS | Performed by: INTERNAL MEDICINE

## 2018-12-25 PROCEDURE — 99232 SBSQ HOSP IP/OBS MODERATE 35: CPT | Performed by: INTERNAL MEDICINE

## 2018-12-25 PROCEDURE — 6360000002 HC RX W HCPCS: Performed by: INTERNAL MEDICINE

## 2018-12-25 PROCEDURE — 99239 HOSP IP/OBS DSCHRG MGMT >30: CPT | Performed by: INTERNAL MEDICINE

## 2018-12-25 RX ORDER — PANTOPRAZOLE SODIUM 40 MG/1
40 GRANULE, DELAYED RELEASE ORAL
Qty: 60 EACH | Refills: 1 | Status: SHIPPED | OUTPATIENT
Start: 2018-12-25

## 2018-12-25 RX ORDER — PANTOPRAZOLE SODIUM 40 MG/1
40 TABLET, DELAYED RELEASE ORAL 2 TIMES DAILY
Qty: 60 TABLET | Refills: 1 | Status: SHIPPED | OUTPATIENT
Start: 2018-12-25 | End: 2018-12-25 | Stop reason: HOSPADM

## 2018-12-25 RX ADMIN — PANTOPRAZOLE SODIUM 40 MG: 40 INJECTION, POWDER, FOR SOLUTION INTRAVENOUS at 10:16

## 2018-12-25 RX ADMIN — IPRATROPIUM BROMIDE AND ALBUTEROL SULFATE 1 AMPULE: .5; 3 SOLUTION RESPIRATORY (INHALATION) at 07:09

## 2018-12-25 NOTE — PROGRESS NOTES
Bedside report given and pt care transferred to [facility]. Pt denies needs at this time. Call light within reach.

## 2018-12-25 NOTE — PROGRESS NOTES
LIVER PROFILE: No results for input(s): AST, ALT, LIPASE, BILIDIR, BILITOT, ALKPHOS in the last 72 hours. Invalid input(s): AMYLASE,  ALB  PT/INR: No results for input(s): PROTIME, INR in the last 72 hours. APTT: No results for input(s): APTT in the last 72 hours. UA:No results for input(s): NITRITE, COLORU, PHUR, LABCAST, WBCUA, RBCUA, MUCUS, TRICHOMONAS, YEAST, BACTERIA, CLARITYU, SPECGRAV, LEUKOCYTESUR, UROBILINOGEN, BILIRUBINUR, BLOODU, GLUCOSEU, AMORPHOUS in the last 72 hours. Invalid input(s): KETONESU  No results for input(s): PHART, OMA9EGE, PO2ART in the last 72 hours.   Cultures: none    Films:  CXR reviewed by me and showed: hyperinflation without focal ASD    ASSESSMENT:  · COPD  · Ongoing tobacco abuse  · Dysphagia most likely secondary to ongoing esophageal strictures  · Chronic respiratory failure with hypoxemia     PLAN:  · Supplemental oxygen to maintain SaO2 >92%; wean as tolerated  · Influenza and pneumovax  · Smoking cessation advised  · GI consulted for dysphagia- EGD showed strictures with esophagitis  · Diet per GI  · Inhaled bronchodilators

## 2018-12-25 NOTE — PROGRESS NOTES
Shift assessment complete. See flow sheet. Evening medications administered. See MAR. Vital signs stable. Patient is alert and oriented to self. Pt denies any present needs/concerns. Call light explained and in reach. Will continue to monitor.

## 2018-12-25 NOTE — DISCHARGE SUMMARY
appearing Awake, alert and oriented. Appears to be not in any distress  Mucous Membranes:  Pink , anicteric  Neck: No JVD, no carotid bruit, no thyromegaly  Chest:  Clear to auscultation bilaterally, no added sounds  Cardiovascular:  RRR S1S2 heard, no murmurs or gallops  Abdomen:  Soft, undistended, non tender, no organomegaly, BS present  Extremities: No edema or cyanosis. Distal pulses well felt  Neurological : grossly normal       Radiology (Please Review Full Report for Details)          cxr-Stable chest demonstrating no acute abnormalities.  Emphysematous changes  evident in the upper lobes    Consults:    1. PT  2. Pulmonary  3.  GI                  Recent Labs      12/23/18 0421   WBC  9.3   HGB  10.0*   HCT  30.7*   MCV  83.9   PLT  387       Recent Labs      12/23/18 0421   NA  141   K  3.3*   CL  103   CO2  30   BUN  9   CREATININE  <0.5*       CBC:  Lab Results   Component Value Date    WBC 9.3 12/23/2018    HGB 10.0 12/23/2018    HCT 30.7 12/23/2018    MCV 83.9 12/23/2018     12/23/2018    NEUTOPHILPCT 65.1 12/23/2018    LYMPHOPCT 23.4 12/23/2018    MONOPCT 9.0 12/23/2018    BASOPCT 0.4 12/23/2018    NEUTROABS 6.0 12/23/2018    LYMPHSABS 2.2 12/23/2018    MONOSABS 0.8 12/23/2018    EOSABS 0.2 12/23/2018    BASOSABS 0.0 12/23/2018     BNP:   Lab Results   Component Value Date     12/23/2018    K 3.3 12/23/2018    CO2 30 12/23/2018    BUN 9 12/23/2018    CREATININE <0.5 12/23/2018    CALCIUM 9.0 12/23/2018                Medication List      START taking these medications    pantoprazole sodium 40 MG Pack packet  Commonly known as:  PROTONIX  Take 1 packet by mouth 2 times daily (before meals)  Replaces:  pantoprazole 40 MG tablet        CONTINUE taking these medications    albuterol sulfate  (90 Base) MCG/ACT inhaler     ANORO ELLIPTA 62.5-25 MCG/INH Aepb inhaler  Generic drug:  umeclidinium-vilanterol     aspirin 81 MG tablet     citalopram 10 MG tablet  Commonly known as: CELEXA     docusate 50 MG/5ML liquid  Commonly known as:  COLACE     furosemide 40 MG tablet  Commonly known as:  LASIX     gabapentin 400 MG capsule  Commonly known as:  NEURONTIN     ipratropium 0.02 % nebulizer solution  Commonly known as:  ATROVENT     LIPITOR 10 MG tablet  Generic drug:  atorvastatin     LORazepam 1 MG tablet  Commonly known as:  ATIVAN     nitroGLYCERIN 0.4 MG SL tablet  Commonly known as:  NITROSTAT     oxyCODONE-acetaminophen 5-325 MG per tablet  Commonly known as:  PERCOCET     potassium chloride 10 MEQ extended release capsule  Commonly known as:  MICRO-K     rivastigmine 4.6 MG/24HR  Commonly known as:  EXELON     sucralfate 1 GM/10ML suspension  Commonly known as:  CARAFATE     tamsulosin 0.4 MG capsule  Commonly known as:  FLOMAX        STOP taking these medications    pantoprazole 40 MG tablet  Commonly known as:  PROTONIX  Replaced by:  pantoprazole sodium 40 MG Pack packet           Where to Get Your Medications      You can get these medications from any pharmacy    Bring a paper prescription for each of these medications  · pantoprazole sodium 40 MG Pack packet           Discharge Condition/Location: stable/home    Follow Up:  Need dilatation again in 1 week     PCP in 1 weeks      Time Spent on discharge is more than 32 minutes in the examination, evaluation, counseling and review of medications and discharge plan.       Lety Powell MD 12/25/2018 10:18 AM

## 2019-01-01 ENCOUNTER — HOSPITAL ENCOUNTER (EMERGENCY)
Age: 69
Discharge: HOME | End: 2019-01-01
Payer: MEDICAID

## 2019-01-01 ENCOUNTER — APPOINTMENT (OUTPATIENT)
Dept: GENERAL RADIOLOGY | Age: 69
End: 2019-01-01
Payer: MEDICAID

## 2019-01-01 VITALS
HEIGHT: 65 IN | WEIGHT: 137 LBS | TEMPERATURE: 98.6 F | BODY MASS INDEX: 22.82 KG/M2 | OXYGEN SATURATION: 100 % | DIASTOLIC BLOOD PRESSURE: 73 MMHG | RESPIRATION RATE: 16 BRPM | HEART RATE: 89 BPM | SYSTOLIC BLOOD PRESSURE: 102 MMHG

## 2019-01-01 DIAGNOSIS — R13.10 DYSPHAGIA, UNSPECIFIED TYPE: Primary | ICD-10-CM

## 2019-01-01 LAB
A/G RATIO: 1.3 (ref 1.1–2.2)
ALBUMIN SERPL-MCNC: 3.4 G/DL (ref 3.4–5)
ALP BLD-CCNC: 61 U/L (ref 40–129)
ALT SERPL-CCNC: <5 U/L (ref 10–40)
ANION GAP SERPL CALCULATED.3IONS-SCNC: 8 MMOL/L (ref 3–16)
AST SERPL-CCNC: 9 U/L (ref 15–37)
BASOPHILS ABSOLUTE: 0 K/UL (ref 0–0.2)
BASOPHILS RELATIVE PERCENT: 0.5 %
BILIRUB SERPL-MCNC: 0.3 MG/DL (ref 0–1)
BUN BLDV-MCNC: 10 MG/DL (ref 7–20)
CALCIUM SERPL-MCNC: 9.2 MG/DL (ref 8.3–10.6)
CHLORIDE BLD-SCNC: 98 MMOL/L (ref 99–110)
CO2: 32 MMOL/L (ref 21–32)
CREAT SERPL-MCNC: 0.6 MG/DL (ref 0.8–1.3)
EOSINOPHILS ABSOLUTE: 0.3 K/UL (ref 0–0.6)
EOSINOPHILS RELATIVE PERCENT: 3.1 %
GFR AFRICAN AMERICAN: >60
GFR NON-AFRICAN AMERICAN: >60
GLOBULIN: 2.6 G/DL
GLUCOSE BLD-MCNC: 98 MG/DL (ref 70–99)
HCT VFR BLD CALC: 33.5 % (ref 40.5–52.5)
HEMOGLOBIN: 11.2 G/DL (ref 13.5–17.5)
LIPASE: 20 U/L (ref 13–60)
LYMPHOCYTES ABSOLUTE: 1.7 K/UL (ref 1–5.1)
LYMPHOCYTES RELATIVE PERCENT: 19 %
MCH RBC QN AUTO: 28.2 PG (ref 26–34)
MCHC RBC AUTO-ENTMCNC: 33.4 G/DL (ref 31–36)
MCV RBC AUTO: 84.5 FL (ref 80–100)
MONOCYTES ABSOLUTE: 0.9 K/UL (ref 0–1.3)
MONOCYTES RELATIVE PERCENT: 10.2 %
NEUTROPHILS ABSOLUTE: 6 K/UL (ref 1.7–7.7)
NEUTROPHILS RELATIVE PERCENT: 67.2 %
PDW BLD-RTO: 22.8 % (ref 12.4–15.4)
PLATELET # BLD: 348 K/UL (ref 135–450)
PMV BLD AUTO: 8.2 FL (ref 5–10.5)
POTASSIUM SERPL-SCNC: 4.2 MMOL/L (ref 3.5–5.1)
RBC # BLD: 3.97 M/UL (ref 4.2–5.9)
SODIUM BLD-SCNC: 138 MMOL/L (ref 136–145)
TOTAL PROTEIN: 6 G/DL (ref 6.4–8.2)
WBC # BLD: 9 K/UL (ref 4–11)

## 2019-01-01 PROCEDURE — 71046 X-RAY EXAM CHEST 2 VIEWS: CPT

## 2019-01-01 PROCEDURE — 85025 COMPLETE CBC W/AUTO DIFF WBC: CPT

## 2019-01-01 PROCEDURE — 2580000003 HC RX 258: Performed by: PHYSICIAN ASSISTANT

## 2019-01-01 PROCEDURE — 80053 COMPREHEN METABOLIC PANEL: CPT

## 2019-01-01 PROCEDURE — 99283 EMERGENCY DEPT VISIT LOW MDM: CPT

## 2019-01-01 PROCEDURE — 83690 ASSAY OF LIPASE: CPT

## 2019-01-01 RX ORDER — 0.9 % SODIUM CHLORIDE 0.9 %
1000 INTRAVENOUS SOLUTION INTRAVENOUS ONCE
Status: COMPLETED | OUTPATIENT
Start: 2019-01-01 | End: 2019-01-01

## 2019-01-01 RX ADMIN — SODIUM CHLORIDE 1000 ML: 9 INJECTION, SOLUTION INTRAVENOUS at 13:56

## 2019-01-30 ENCOUNTER — ANESTHESIA (OUTPATIENT)
Dept: ENDOSCOPY | Age: 69
End: 2019-01-30
Payer: MEDICAID

## 2019-01-30 ENCOUNTER — ANESTHESIA EVENT (OUTPATIENT)
Dept: ENDOSCOPY | Age: 69
End: 2019-01-30
Payer: MEDICAID

## 2019-01-30 ENCOUNTER — APPOINTMENT (OUTPATIENT)
Dept: GENERAL RADIOLOGY | Age: 69
End: 2019-01-30
Attending: INTERNAL MEDICINE
Payer: MEDICAID

## 2019-01-30 ENCOUNTER — HOSPITAL ENCOUNTER (OUTPATIENT)
Age: 69
Setting detail: OUTPATIENT SURGERY
Discharge: HOME | End: 2019-01-30
Attending: INTERNAL MEDICINE | Admitting: INTERNAL MEDICINE
Payer: MEDICAID

## 2019-01-30 VITALS
SYSTOLIC BLOOD PRESSURE: 137 MMHG | HEIGHT: 65 IN | DIASTOLIC BLOOD PRESSURE: 83 MMHG | OXYGEN SATURATION: 100 % | BODY MASS INDEX: 19.99 KG/M2 | HEART RATE: 94 BPM | RESPIRATION RATE: 18 BRPM | WEIGHT: 120 LBS | TEMPERATURE: 98 F

## 2019-01-30 VITALS — SYSTOLIC BLOOD PRESSURE: 111 MMHG | DIASTOLIC BLOOD PRESSURE: 68 MMHG | OXYGEN SATURATION: 94 %

## 2019-01-30 PROCEDURE — 76000 FLUOROSCOPY <1 HR PHYS/QHP: CPT

## 2019-01-30 PROCEDURE — C1726 CATH, BAL DIL, NON-VASCULAR: HCPCS | Performed by: INTERNAL MEDICINE

## 2019-01-30 PROCEDURE — 3609012400 HC EGD TRANSORAL BIOPSY SINGLE/MULTIPLE: Performed by: INTERNAL MEDICINE

## 2019-01-30 PROCEDURE — 88342 IMHCHEM/IMCYTCHM 1ST ANTB: CPT

## 2019-01-30 PROCEDURE — 2580000003 HC RX 258: Performed by: NURSE ANESTHETIST, CERTIFIED REGISTERED

## 2019-01-30 PROCEDURE — 88341 IMHCHEM/IMCYTCHM EA ADD ANTB: CPT

## 2019-01-30 PROCEDURE — 2500000003 HC RX 250 WO HCPCS: Performed by: NURSE ANESTHETIST, CERTIFIED REGISTERED

## 2019-01-30 PROCEDURE — 3609013700 HC EGD STENT PLACEMENT: Performed by: INTERNAL MEDICINE

## 2019-01-30 PROCEDURE — 3700000000 HC ANESTHESIA ATTENDED CARE: Performed by: INTERNAL MEDICINE

## 2019-01-30 PROCEDURE — 6360000002 HC RX W HCPCS: Performed by: NURSE ANESTHETIST, CERTIFIED REGISTERED

## 2019-01-30 PROCEDURE — 88305 TISSUE EXAM BY PATHOLOGIST: CPT

## 2019-01-30 PROCEDURE — 7100000011 HC PHASE II RECOVERY - ADDTL 15 MIN: Performed by: INTERNAL MEDICINE

## 2019-01-30 PROCEDURE — 7100000010 HC PHASE II RECOVERY - FIRST 15 MIN: Performed by: INTERNAL MEDICINE

## 2019-01-30 PROCEDURE — 3609012500 HC EGD DILATION BALLOON: Performed by: INTERNAL MEDICINE

## 2019-01-30 PROCEDURE — 2709999900 HC NON-CHARGEABLE SUPPLY: Performed by: INTERNAL MEDICINE

## 2019-01-30 PROCEDURE — 3700000001 HC ADD 15 MINUTES (ANESTHESIA): Performed by: INTERNAL MEDICINE

## 2019-01-30 RX ORDER — HYDRALAZINE HYDROCHLORIDE 20 MG/ML
5 INJECTION INTRAMUSCULAR; INTRAVENOUS EVERY 10 MIN PRN
Status: DISCONTINUED | OUTPATIENT
Start: 2019-01-30 | End: 2019-01-30 | Stop reason: HOSPADM

## 2019-01-30 RX ORDER — SODIUM CHLORIDE, SODIUM LACTATE, POTASSIUM CHLORIDE, CALCIUM CHLORIDE 600; 310; 30; 20 MG/100ML; MG/100ML; MG/100ML; MG/100ML
INJECTION, SOLUTION INTRAVENOUS CONTINUOUS PRN
Status: DISCONTINUED | OUTPATIENT
Start: 2019-01-30 | End: 2019-01-30 | Stop reason: SDUPTHER

## 2019-01-30 RX ORDER — HYDROMORPHONE HCL 110MG/55ML
0.5 PATIENT CONTROLLED ANALGESIA SYRINGE INTRAVENOUS EVERY 5 MIN PRN
Status: DISCONTINUED | OUTPATIENT
Start: 2019-01-30 | End: 2019-01-30 | Stop reason: HOSPADM

## 2019-01-30 RX ORDER — ONDANSETRON 2 MG/ML
4 INJECTION INTRAMUSCULAR; INTRAVENOUS EVERY 10 MIN PRN
Status: DISCONTINUED | OUTPATIENT
Start: 2019-01-30 | End: 2019-01-30 | Stop reason: HOSPADM

## 2019-01-30 RX ORDER — OXYCODONE HYDROCHLORIDE AND ACETAMINOPHEN 5; 325 MG/1; MG/1
2 TABLET ORAL PRN
Status: DISCONTINUED | OUTPATIENT
Start: 2019-01-30 | End: 2019-01-30 | Stop reason: HOSPADM

## 2019-01-30 RX ORDER — OXYCODONE HYDROCHLORIDE AND ACETAMINOPHEN 5; 325 MG/1; MG/1
1 TABLET ORAL PRN
Status: DISCONTINUED | OUTPATIENT
Start: 2019-01-30 | End: 2019-01-30 | Stop reason: HOSPADM

## 2019-01-30 RX ORDER — HYDROMORPHONE HCL 110MG/55ML
0.25 PATIENT CONTROLLED ANALGESIA SYRINGE INTRAVENOUS EVERY 5 MIN PRN
Status: DISCONTINUED | OUTPATIENT
Start: 2019-01-30 | End: 2019-01-30 | Stop reason: HOSPADM

## 2019-01-30 RX ORDER — MEPERIDINE HYDROCHLORIDE 25 MG/ML
12.5 INJECTION INTRAMUSCULAR; INTRAVENOUS; SUBCUTANEOUS EVERY 5 MIN PRN
Status: DISCONTINUED | OUTPATIENT
Start: 2019-01-30 | End: 2019-01-30 | Stop reason: HOSPADM

## 2019-01-30 RX ORDER — PROPOFOL 10 MG/ML
INJECTION, EMULSION INTRAVENOUS PRN
Status: DISCONTINUED | OUTPATIENT
Start: 2019-01-30 | End: 2019-01-30 | Stop reason: SDUPTHER

## 2019-01-30 RX ORDER — LABETALOL HYDROCHLORIDE 5 MG/ML
5 INJECTION, SOLUTION INTRAVENOUS EVERY 10 MIN PRN
Status: DISCONTINUED | OUTPATIENT
Start: 2019-01-30 | End: 2019-01-30 | Stop reason: HOSPADM

## 2019-01-30 RX ORDER — LIDOCAINE HYDROCHLORIDE 20 MG/ML
INJECTION, SOLUTION INFILTRATION; PERINEURAL PRN
Status: DISCONTINUED | OUTPATIENT
Start: 2019-01-30 | End: 2019-01-30 | Stop reason: SDUPTHER

## 2019-01-30 RX ADMIN — LIDOCAINE HYDROCHLORIDE 40 MG: 20 INJECTION, SOLUTION INFILTRATION; PERINEURAL at 10:55

## 2019-01-30 RX ADMIN — PROPOFOL 200 MG: 10 INJECTION, EMULSION INTRAVENOUS at 10:55

## 2019-01-30 RX ADMIN — SODIUM CHLORIDE, POTASSIUM CHLORIDE, SODIUM LACTATE AND CALCIUM CHLORIDE: 600; 310; 30; 20 INJECTION, SOLUTION INTRAVENOUS at 10:49

## 2019-01-30 ASSESSMENT — PULMONARY FUNCTION TESTS
PIF_VALUE: 0
PIF_VALUE: 1
PIF_VALUE: 0

## 2019-01-30 ASSESSMENT — LIFESTYLE VARIABLES: SMOKING_STATUS: 1

## 2019-01-30 ASSESSMENT — PAIN - FUNCTIONAL ASSESSMENT: PAIN_FUNCTIONAL_ASSESSMENT: 0-10

## 2019-01-30 ASSESSMENT — COPD QUESTIONNAIRES: CAT_SEVERITY: MILD

## 2019-01-30 NOTE — OP NOTE
[address]                 [address] [address]                                OPERATIVE REPORT    PATIENT NAME: Donna Reyes                       :        1950  MED REC NO:   [id number]                          ROOM:  ACCOUNT NO:   [de-identified]                           ADMIT DATE: 2019  PROVIDER:     Nicol Quiñonez MD    DATE OF PROCEDURE:  2019    PRE-PROCEDURE DIAGNOSIS:  Refractory esophageal stricture. POST-PROCEDURE DIAGNOSES:  1.  A 5 cm long refractory esophageal stricture, benign appearing. 2.  A 5 cm hiatal hernia. 3.  Otherwise normal EGD. 4.  Successful deployment of 20 mm x 8 cm self-expanding metal fully  covered stent by Shaunna Duggan. PROCEDURE:  EGD with biopsy, balloon dilation, and stent deployment. SURGEON:  Nicol Quiñonez MD    PROCEDURE INDICATIONS:  A 43-year-old male with history of COPD,  emphysema, and refractory esophageal stricture, presents for esophageal  stent placement. He has had multiple EGDs with dilation of the  stricture without any significant improvement. EGD with stent  deployment is preferred to be the next option in management of this  refractory stricture. MEDICATIONS:  MAC per anesthesia. PROCEDURE DETAILS:  Informed consent obtained after discussing risks,  benefits, and alternatives. A full history and physical was performed. The patient was classed as ASA class III. Medications were sequentially  given by anesthesia. Cardiopulmonary status was continuously monitored  throughout the procedure. The patient was placed in supine position on  fluoro table. Once the patient was adequately positioned, standard  upper gastroscope was inserted in the mouth and advanced under direct  visualization to the second portion of the duodenum.   Entire mucosa of  esophagus, stomach (retroflexed and forward views), duodenum (bulb,  sweep, and second portion) were examined carefully during withdrawal.   The patient tolerated the procedure well without any difficulties. FINDINGS:  ESOPHAGUS:  There was a tight, 5 cm long fibrotic stricture  in the distal esophagus from 35-40 cm from entry. This was traversed  after balloon dilation with 10-12 mm [employer] step-wise balloon  catheter. Biopsies were taken of the esophageal stricture. There was a  5 cm hiatal hernia from 40-45 cm from entry. After the scope was  completed, it was withdrawn with a wire left in place. Then, stent  deployment system by Virginia Duggan was successfully deployed in the  distal esophagus under fluoroscopic guidance across the stricture. The  stent used was [employer] Evolution 20 mm x 8 cm fully covered self-expanding  metal stent. STOMACH:  Examined portions of stomach appeared normal both on forward  and retroflexed views. DUODENUM:  Examined portions of duodenum appeared normal.    SUMMARY:  1.  A 5 cm distal esophageal stricture. 2.  A 5 cm hiatal hernia. 3.  Successful deployment of [employer] Evolution 20 mm x 8 cm stent across  the stricture under fluoroscopic guidance. RECOMMENDATIONS:  1. Discharge the patient to home when standard parameters are met. 2.  Await pathology results. 3.  Resume a low-residue diet. 4.  Repeat abdominal x-ray in 1 week to confirm position of the stent. 5.  The patient will need repeat EGD with stent extraction in 3-4  months.         Letty Montesinos MD    D: 01/30/2019 11:23:38       T: 01/30/2019 12:19:41     [id number]  Job#: [id number]     Doc#: [id number]    CC:  Dr. Ulises Delgadillo

## 2019-01-30 NOTE — OP NOTE
Isidro Carballo   1/30/2019  Esophagogastroduodenoscopy  A pre-procedure re-evaluation was performed immediately prior to the procedure.   Preprocedure Dx: esophageal stricture  Postprocedure Dx: 5cm distal esophageal stricture, 20mm x 8cm fully covered SEMS across stricture  Medications: Procedural sedation with Versed & Fentanyl  Complications: None  Estimated Blood Loss: <5cc  Specimens: were obtained  Perfecto Hamlin

## 2019-01-30 NOTE — H&P
History and Physical / Pre-Sedation Assessment    Patient:  Dorothy Sanchez   :   1950     Intended Procedure:  EGD    HPI: 80year old male with esophageal stricture    Past Medical History:   Diagnosis Date    [facility] [facility]     Emphysema of lung ([facility] [facility]     Nondependent tobacco use disorder      Past Surgical History:   Procedure Laterality Date    UPPER GASTROINTESTINAL ENDOSCOPY N/A 2018    EGD DILATION BALLOON performed by Myron Saucedo MD at [address] [place] [address] N/A 2018    EGD DILATION BALLOON performed by Myron Saucedo MD at [address] [place] [address]  2018    foreign body removal, balloon dilation    UPPER GASTROINTESTINAL ENDOSCOPY N/A 2018    EGD FOREIGN BODY REMOVAL performed by Myron Saucedo MD at [address] [place] [address]  2018    EGD DILATION BALLOON performed by Myron Saucedo MD at [id number] [place] [address]       Medications reviewed  Prior to Admission medications    Medication Sig Start Date End Date Taking? Authorizing Provider   pantoprazole sodium (PROTONIX) 40 MG PACK packet Take 1 packet by mouth 2 times daily (before meals) 18  Yes Tiffany Gomez MD   aspirin 81 MG tablet Take 81 mg by mouth daily   Yes Historical Provider, MD   LORazepam (ATIVAN) 1 MG tablet Take 1 mg by mouth daily as needed for Anxiety. .   Yes Historical Provider, MD   atorvastatin (LIPITOR) 10 MG tablet Take 10 mg by mouth nightly   Yes Historical Provider, MD   citalopram (CELEXA) 10 MG tablet Take 10 mg by mouth daily   Yes Historical Provider, MD   docusate (COLACE) 50 MG/5ML liquid Take 100 mg by mouth 2 times daily   Yes Historical Provider, MD   gabapentin (NEURONTIN) 400 MG capsule Take 400 mg by mouth 3 times daily. .   Yes Historical Provider, MD   ipratropium (ATROVENT) 0.02 % nebulizer solution Take 1.25 mg by nebulization every 4 hours as needed for Wheezing   Yes Historical Provider, MD   oxyCODONE-acetaminophen (PERCOCET) 5-325 MG per tablet Take 1 tablet by mouth every 6 hours as needed for Pain. .   Yes Historical Provider, MD   albuterol sulfate  (90 Base) MCG/ACT inhaler Inhale 2 puffs into the lungs every 6 hours as needed for Wheezing   Yes Historical Provider, MD   rivastigmine (EXELON) 4.6 MG/24HR Place 1 patch onto the skin daily   Yes Historical Provider, MD   furosemide (LASIX) 40 MG tablet Take 40 mg by mouth daily   Yes Historical Provider, MD   potassium chloride (MICRO-K) 10 MEQ extended release capsule Take 10 mEq by mouth daily   Yes Historical Provider, MD   umeclidinium-vilanterol (ANORO ELLIPTA) 62.5-25 MCG/INH AEPB inhaler Inhale 1 puff into the lungs daily 12/12/18  Yes Historical Provider, MD   sucralfate (CARAFATE) 1 GM/10ML suspension Take 1 g by mouth 4 times daily 12/11/18  Yes Historical Provider, MD   tamsulosin (FLOMAX) 0.4 MG capsule Take 0.4 mg by mouth daily   Yes Historical Provider, MD   nitroGLYCERIN (NITROSTAT) 0.4 MG SL tablet Place 0.4 mg under the tongue every 5 minutes as needed for Chest pain up to max of 3 total doses. If no relief after 1 dose, call 911. 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 Physical Exam:  Vital Signs: /61   Pulse 103   Temp 97 °F (36.1 °C) (Temporal)   Resp 16   Ht 5' 5\" (1.651 m)   Wt 120 lb (54.4 kg)   SpO2 98%   BMI 19.97 kg/m²    Airway: Mallampati: II (soft palate, uvula, fauces visible)  Pulmonary:Normal  Cardiac:Normal  Abdomen:Normal    Pre-Procedure Assessment / Plan:  ASA: Class 2 - A normal healthy patient with mild systemic disease  Level of Sedation Plan: Moderate sedation  Post Procedure plan: Return to same level of care    I assessed the patient and find that the

## 2019-02-28 ENCOUNTER — HOSPITAL ENCOUNTER (INPATIENT)
Age: 69
LOS: 5 days | Discharge: HOME | DRG: 813 | End: 2019-03-07
Attending: EMERGENCY MEDICINE | Admitting: INTERNAL MEDICINE
Payer: MEDICAID

## 2019-02-28 ENCOUNTER — APPOINTMENT (OUTPATIENT)
Dept: GENERAL RADIOLOGY | Age: 69
DRG: 813 | End: 2019-02-28
Payer: MEDICAID

## 2019-02-28 DIAGNOSIS — G89.29 OTHER CHRONIC PAIN: ICD-10-CM

## 2019-02-28 DIAGNOSIS — K22.2 ESOPHAGEAL STRICTURE: ICD-10-CM

## 2019-02-28 DIAGNOSIS — Z99.81 OXYGEN DEPENDENT: ICD-10-CM

## 2019-02-28 DIAGNOSIS — F03.90 DEMENTIA WITHOUT BEHAVIORAL DISTURBANCE, UNSPECIFIED DEMENTIA TYPE: ICD-10-CM

## 2019-02-28 DIAGNOSIS — Z87.09 HISTORY OF COPD: ICD-10-CM

## 2019-02-28 DIAGNOSIS — Z83.79 FAMILY HISTORY OF GERD: ICD-10-CM

## 2019-02-28 DIAGNOSIS — T85.528A MIGRATION OF ESOPHAGEAL STENT, INITIAL ENCOUNTER: Primary | ICD-10-CM

## 2019-02-28 DIAGNOSIS — D72.829 LEUKOCYTOSIS, UNSPECIFIED TYPE: ICD-10-CM

## 2019-02-28 LAB
A/G RATIO: 0.9 (ref 1.1–2.2)
ALBUMIN SERPL-MCNC: 3.4 G/DL (ref 3.4–5)
ALP BLD-CCNC: 72 U/L (ref 40–129)
ALT SERPL-CCNC: 7 U/L (ref 10–40)
ANION GAP SERPL CALCULATED.3IONS-SCNC: 11 MMOL/L (ref 3–16)
AST SERPL-CCNC: 14 U/L (ref 15–37)
BASOPHILS ABSOLUTE: 0 K/UL (ref 0–0.2)
BASOPHILS RELATIVE PERCENT: 0.2 %
BILIRUB SERPL-MCNC: 0.5 MG/DL (ref 0–1)
BUN BLDV-MCNC: 12 MG/DL (ref 7–20)
CALCIUM SERPL-MCNC: 9.4 MG/DL (ref 8.3–10.6)
CHLORIDE BLD-SCNC: 94 MMOL/L (ref 99–110)
CO2: 31 MMOL/L (ref 21–32)
CREAT SERPL-MCNC: 0.7 MG/DL (ref 0.8–1.3)
EOSINOPHILS ABSOLUTE: 0 K/UL (ref 0–0.6)
EOSINOPHILS RELATIVE PERCENT: 0.1 %
GFR AFRICAN AMERICAN: >60
GFR NON-AFRICAN AMERICAN: >60
GLOBULIN: 3.6 G/DL
GLUCOSE BLD-MCNC: 136 MG/DL (ref 70–99)
HCT VFR BLD CALC: 32.2 % (ref 40.5–52.5)
HEMOGLOBIN: 10.3 G/DL (ref 13.5–17.5)
INR BLD: 1.32 (ref 0.86–1.14)
LACTIC ACID: 1.8 MMOL/L (ref 0.4–2)
LYMPHOCYTES ABSOLUTE: 1.2 K/UL (ref 1–5.1)
LYMPHOCYTES RELATIVE PERCENT: 6.2 %
MCH RBC QN AUTO: 28.1 PG (ref 26–34)
MCHC RBC AUTO-ENTMCNC: 31.9 G/DL (ref 31–36)
MCV RBC AUTO: 88 FL (ref 80–100)
MONOCYTES ABSOLUTE: 1.5 K/UL (ref 0–1.3)
MONOCYTES RELATIVE PERCENT: 7.5 %
NEUTROPHILS ABSOLUTE: 16.7 K/UL (ref 1.7–7.7)
NEUTROPHILS RELATIVE PERCENT: 86 %
PDW BLD-RTO: 18.3 % (ref 12.4–15.4)
PLATELET # BLD: 260 K/UL (ref 135–450)
PMV BLD AUTO: 9.8 FL (ref 5–10.5)
POTASSIUM REFLEX MAGNESIUM: 4.3 MMOL/L (ref 3.5–5.1)
PRO-BNP: 503 PG/ML (ref 0–124)
PROTHROMBIN TIME: 15.1 SEC (ref 9.8–13)
RBC # BLD: 3.66 M/UL (ref 4.2–5.9)
SODIUM BLD-SCNC: 136 MMOL/L (ref 136–145)
TOTAL PROTEIN: 7 G/DL (ref 6.4–8.2)
TROPONIN: <0.01 NG/ML
WBC # BLD: 19.4 K/UL (ref 4–11)

## 2019-02-28 PROCEDURE — 85025 COMPLETE CBC W/AUTO DIFF WBC: CPT

## 2019-02-28 PROCEDURE — 71045 X-RAY EXAM CHEST 1 VIEW: CPT

## 2019-02-28 PROCEDURE — 83605 ASSAY OF LACTIC ACID: CPT

## 2019-02-28 PROCEDURE — 84484 ASSAY OF TROPONIN QUANT: CPT

## 2019-02-28 PROCEDURE — 85610 PROTHROMBIN TIME: CPT

## 2019-02-28 PROCEDURE — 6370000000 HC RX 637 (ALT 250 FOR IP): Performed by: NURSE PRACTITIONER

## 2019-02-28 PROCEDURE — 96375 TX/PRO/DX INJ NEW DRUG ADDON: CPT

## 2019-02-28 PROCEDURE — 83880 ASSAY OF NATRIURETIC PEPTIDE: CPT

## 2019-02-28 PROCEDURE — 87040 BLOOD CULTURE FOR BACTERIA: CPT

## 2019-02-28 PROCEDURE — 80053 COMPREHEN METABOLIC PANEL: CPT

## 2019-02-28 PROCEDURE — 6360000002 HC RX W HCPCS: Performed by: NURSE PRACTITIONER

## 2019-02-28 PROCEDURE — 0DP58DZ REMOVAL OF INTRALUMINAL DEVICE FROM ESOPHAGUS, VIA NATURAL OR ARTIFICIAL OPENING ENDOSCOPIC: ICD-10-PCS | Performed by: INTERNAL MEDICINE

## 2019-02-28 PROCEDURE — 2580000003 HC RX 258: Performed by: NURSE PRACTITIONER

## 2019-02-28 PROCEDURE — 93005 ELECTROCARDIOGRAM TRACING: CPT | Performed by: NURSE PRACTITIONER

## 2019-02-28 PROCEDURE — 99285 EMERGENCY DEPT VISIT HI MDM: CPT

## 2019-02-28 PROCEDURE — 0D758ZZ DILATION OF ESOPHAGUS, VIA NATURAL OR ARTIFICIAL OPENING ENDOSCOPIC: ICD-10-PCS | Performed by: INTERNAL MEDICINE

## 2019-02-28 PROCEDURE — 96361 HYDRATE IV INFUSION ADD-ON: CPT

## 2019-02-28 PROCEDURE — 96374 THER/PROPH/DIAG INJ IV PUSH: CPT

## 2019-02-28 RX ORDER — 0.9 % SODIUM CHLORIDE 0.9 %
30 INTRAVENOUS SOLUTION INTRAVENOUS ONCE
Status: COMPLETED | OUTPATIENT
Start: 2019-02-28 | End: 2019-03-01

## 2019-02-28 RX ORDER — IPRATROPIUM BROMIDE AND ALBUTEROL SULFATE 2.5; .5 MG/3ML; MG/3ML
1 SOLUTION RESPIRATORY (INHALATION) ONCE
Status: COMPLETED | OUTPATIENT
Start: 2019-02-28 | End: 2019-02-28

## 2019-02-28 RX ORDER — METHYLPREDNISOLONE SODIUM SUCCINATE 125 MG/2ML
125 INJECTION, POWDER, LYOPHILIZED, FOR SOLUTION INTRAMUSCULAR; INTRAVENOUS ONCE
Status: COMPLETED | OUTPATIENT
Start: 2019-02-28 | End: 2019-02-28

## 2019-02-28 RX ORDER — DILTIAZEM HYDROCHLORIDE 240 MG/1
240 CAPSULE, EXTENDED RELEASE ORAL DAILY
COMMUNITY

## 2019-02-28 RX ADMIN — IPRATROPIUM BROMIDE AND ALBUTEROL SULFATE 1 AMPULE: .5; 3 SOLUTION RESPIRATORY (INHALATION) at 22:13

## 2019-02-28 RX ADMIN — SODIUM CHLORIDE 1632 ML: 9 INJECTION, SOLUTION INTRAVENOUS at 23:31

## 2019-02-28 RX ADMIN — METHYLPREDNISOLONE SODIUM SUCCINATE 125 MG: 125 INJECTION, POWDER, FOR SOLUTION INTRAMUSCULAR; INTRAVENOUS at 22:13

## 2019-02-28 ASSESSMENT — ENCOUNTER SYMPTOMS
SHORTNESS OF BREATH: 1
ABDOMINAL PAIN: 0

## 2019-03-01 ENCOUNTER — APPOINTMENT (OUTPATIENT)
Dept: GENERAL RADIOLOGY | Age: 69
DRG: 813 | End: 2019-03-01
Payer: MEDICAID

## 2019-03-01 ENCOUNTER — ANESTHESIA EVENT (OUTPATIENT)
Dept: ENDOSCOPY | Age: 69
DRG: 813 | End: 2019-03-01
Payer: MEDICAID

## 2019-03-01 ENCOUNTER — ANESTHESIA (OUTPATIENT)
Dept: ENDOSCOPY | Age: 69
DRG: 813 | End: 2019-03-01
Payer: MEDICAID

## 2019-03-01 VITALS — SYSTOLIC BLOOD PRESSURE: 115 MMHG | DIASTOLIC BLOOD PRESSURE: 65 MMHG | OXYGEN SATURATION: 95 %

## 2019-03-01 LAB
ANION GAP SERPL CALCULATED.3IONS-SCNC: 10 MMOL/L (ref 3–16)
BACTERIA: ABNORMAL /HPF
BASOPHILS ABSOLUTE: 0 K/UL (ref 0–0.2)
BASOPHILS RELATIVE PERCENT: 0.2 %
BILIRUBIN URINE: NEGATIVE
BLOOD, URINE: ABNORMAL
BUN BLDV-MCNC: 13 MG/DL (ref 7–20)
CALCIUM SERPL-MCNC: 9.4 MG/DL (ref 8.3–10.6)
CHLORIDE BLD-SCNC: 97 MMOL/L (ref 99–110)
CLARITY: ABNORMAL
CO2: 27 MMOL/L (ref 21–32)
COLOR: YELLOW
CREAT SERPL-MCNC: 0.6 MG/DL (ref 0.8–1.3)
EKG ATRIAL RATE: 99 BPM
EKG DIAGNOSIS: NORMAL
EKG P AXIS: 74 DEGREES
EKG P-R INTERVAL: 150 MS
EKG Q-T INTERVAL: 320 MS
EKG QRS DURATION: 74 MS
EKG QTC CALCULATION (BAZETT): 410 MS
EKG R AXIS: 78 DEGREES
EKG T AXIS: 78 DEGREES
EKG VENTRICULAR RATE: 99 BPM
EOSINOPHILS ABSOLUTE: 0 K/UL (ref 0–0.6)
EOSINOPHILS RELATIVE PERCENT: 0.1 %
EPITHELIAL CELLS, UA: ABNORMAL /HPF
GFR AFRICAN AMERICAN: >60
GFR NON-AFRICAN AMERICAN: >60
GLUCOSE BLD-MCNC: 151 MG/DL (ref 70–99)
GLUCOSE URINE: NEGATIVE MG/DL
HCT VFR BLD CALC: 32 % (ref 40.5–52.5)
HEMOGLOBIN: 10.2 G/DL (ref 13.5–17.5)
KETONES, URINE: 15 MG/DL
LEUKOCYTE ESTERASE, URINE: ABNORMAL
LYMPHOCYTES ABSOLUTE: 0.5 K/UL (ref 1–5.1)
LYMPHOCYTES RELATIVE PERCENT: 4.1 %
MCH RBC QN AUTO: 28.3 PG (ref 26–34)
MCHC RBC AUTO-ENTMCNC: 31.8 G/DL (ref 31–36)
MCV RBC AUTO: 88.9 FL (ref 80–100)
MICROSCOPIC EXAMINATION: YES
MONOCYTES ABSOLUTE: 0.1 K/UL (ref 0–1.3)
MONOCYTES RELATIVE PERCENT: 0.8 %
MUCUS: ABNORMAL /LPF
NEUTROPHILS ABSOLUTE: 11.1 K/UL (ref 1.7–7.7)
NEUTROPHILS RELATIVE PERCENT: 94.8 %
NITRITE, URINE: NEGATIVE
PDW BLD-RTO: 18.6 % (ref 12.4–15.4)
PH UA: 6
PLATELET # BLD: 240 K/UL (ref 135–450)
PMV BLD AUTO: 9.9 FL (ref 5–10.5)
POTASSIUM REFLEX MAGNESIUM: 4.2 MMOL/L (ref 3.5–5.1)
PROTEIN UA: ABNORMAL MG/DL
RBC # BLD: 3.6 M/UL (ref 4.2–5.9)
RBC UA: ABNORMAL /HPF (ref 0–2)
SODIUM BLD-SCNC: 134 MMOL/L (ref 136–145)
SPECIFIC GRAVITY UA: 1.02
URINE REFLEX TO CULTURE: YES
URINE TYPE: ABNORMAL
UROBILINOGEN, URINE: 0.2 E.U./DL
WBC # BLD: 11.7 K/UL (ref 4–11)
WBC UA: ABNORMAL /HPF (ref 0–5)
YEAST: PRESENT /HPF

## 2019-03-01 PROCEDURE — 2709999900 HC NON-CHARGEABLE SUPPLY: Performed by: INTERNAL MEDICINE

## 2019-03-01 PROCEDURE — 6360000002 HC RX W HCPCS: Performed by: ANESTHESIOLOGY

## 2019-03-01 PROCEDURE — 6370000000 HC RX 637 (ALT 250 FOR IP): Performed by: INTERNAL MEDICINE

## 2019-03-01 PROCEDURE — 94761 N-INVAS EAR/PLS OXIMETRY MLT: CPT

## 2019-03-01 PROCEDURE — 74022 RADEX COMPL AQT ABD SERIES: CPT

## 2019-03-01 PROCEDURE — 36415 COLL VENOUS BLD VENIPUNCTURE: CPT

## 2019-03-01 PROCEDURE — 7100000011 HC PHASE II RECOVERY - ADDTL 15 MIN: Performed by: INTERNAL MEDICINE

## 2019-03-01 PROCEDURE — 81001 URINALYSIS AUTO W/SCOPE: CPT

## 2019-03-01 PROCEDURE — 2700000000 HC OXYGEN THERAPY PER DAY

## 2019-03-01 PROCEDURE — 6360000002 HC RX W HCPCS: Performed by: NURSE ANESTHETIST, CERTIFIED REGISTERED

## 2019-03-01 PROCEDURE — 2580000003 HC RX 258: Performed by: INTERNAL MEDICINE

## 2019-03-01 PROCEDURE — 85025 COMPLETE CBC W/AUTO DIFF WBC: CPT

## 2019-03-01 PROCEDURE — 2500000003 HC RX 250 WO HCPCS: Performed by: NURSE ANESTHETIST, CERTIFIED REGISTERED

## 2019-03-01 PROCEDURE — 7100000010 HC PHASE II RECOVERY - FIRST 15 MIN: Performed by: INTERNAL MEDICINE

## 2019-03-01 PROCEDURE — 2580000003 HC RX 258: Performed by: NURSE ANESTHETIST, CERTIFIED REGISTERED

## 2019-03-01 PROCEDURE — 87086 URINE CULTURE/COLONY COUNT: CPT

## 2019-03-01 PROCEDURE — 3609012500 HC EGD DILATION BALLOON: Performed by: INTERNAL MEDICINE

## 2019-03-01 PROCEDURE — 80048 BASIC METABOLIC PNL TOTAL CA: CPT

## 2019-03-01 PROCEDURE — 3609155700 HC EGD STENT REMOVAL: Performed by: INTERNAL MEDICINE

## 2019-03-01 PROCEDURE — 3700000000 HC ANESTHESIA ATTENDED CARE: Performed by: INTERNAL MEDICINE

## 2019-03-01 PROCEDURE — C1726 CATH, BAL DIL, NON-VASCULAR: HCPCS | Performed by: INTERNAL MEDICINE

## 2019-03-01 PROCEDURE — 99232 SBSQ HOSP IP/OBS MODERATE 35: CPT | Performed by: INTERNAL MEDICINE

## 2019-03-01 PROCEDURE — 3700000001 HC ADD 15 MINUTES (ANESTHESIA): Performed by: INTERNAL MEDICINE

## 2019-03-01 PROCEDURE — 93010 ELECTROCARDIOGRAM REPORT: CPT | Performed by: INTERNAL MEDICINE

## 2019-03-01 PROCEDURE — G0378 HOSPITAL OBSERVATION PER HR: HCPCS

## 2019-03-01 RX ORDER — ASPIRIN 81 MG/1
81 TABLET, CHEWABLE ORAL DAILY
Status: DISCONTINUED | OUTPATIENT
Start: 2019-03-01 | End: 2019-03-07 | Stop reason: HOSPADM

## 2019-03-01 RX ORDER — LEVALBUTEROL 1.25 MG/.5ML
1.25 SOLUTION, CONCENTRATE RESPIRATORY (INHALATION) ONCE
Status: COMPLETED | OUTPATIENT
Start: 2019-03-01 | End: 2019-03-01

## 2019-03-01 RX ORDER — SODIUM CHLORIDE 0.9 % (FLUSH) 0.9 %
10 SYRINGE (ML) INJECTION PRN
Status: DISCONTINUED | OUTPATIENT
Start: 2019-03-01 | End: 2019-03-07 | Stop reason: HOSPADM

## 2019-03-01 RX ORDER — PANTOPRAZOLE SODIUM 40 MG/1
40 GRANULE, DELAYED RELEASE ORAL
Status: DISCONTINUED | OUTPATIENT
Start: 2019-03-01 | End: 2019-03-02 | Stop reason: ALTCHOICE

## 2019-03-01 RX ORDER — SODIUM CHLORIDE, SODIUM LACTATE, POTASSIUM CHLORIDE, CALCIUM CHLORIDE 600; 310; 30; 20 MG/100ML; MG/100ML; MG/100ML; MG/100ML
INJECTION, SOLUTION INTRAVENOUS CONTINUOUS PRN
Status: DISCONTINUED | OUTPATIENT
Start: 2019-03-01 | End: 2019-03-01 | Stop reason: SDUPTHER

## 2019-03-01 RX ORDER — LIDOCAINE HYDROCHLORIDE 20 MG/ML
INJECTION, SOLUTION INFILTRATION; PERINEURAL PRN
Status: DISCONTINUED | OUTPATIENT
Start: 2019-03-01 | End: 2019-03-01 | Stop reason: SDUPTHER

## 2019-03-01 RX ORDER — SODIUM CHLORIDE 0.9 % (FLUSH) 0.9 %
10 SYRINGE (ML) INJECTION EVERY 12 HOURS SCHEDULED
Status: DISCONTINUED | OUTPATIENT
Start: 2019-03-01 | End: 2019-03-07 | Stop reason: HOSPADM

## 2019-03-01 RX ORDER — ONDANSETRON 2 MG/ML
4 INJECTION INTRAMUSCULAR; INTRAVENOUS EVERY 6 HOURS PRN
Status: DISCONTINUED | OUTPATIENT
Start: 2019-03-01 | End: 2019-03-07 | Stop reason: HOSPADM

## 2019-03-01 RX ORDER — ACETAMINOPHEN 325 MG/1
650 TABLET ORAL EVERY 4 HOURS PRN
Status: DISCONTINUED | OUTPATIENT
Start: 2019-03-01 | End: 2019-03-07 | Stop reason: HOSPADM

## 2019-03-01 RX ORDER — CITALOPRAM 20 MG/1
10 TABLET ORAL DAILY
Status: DISCONTINUED | OUTPATIENT
Start: 2019-03-01 | End: 2019-03-07 | Stop reason: HOSPADM

## 2019-03-01 RX ORDER — MORPHINE SULFATE 4 MG/ML
2 INJECTION, SOLUTION INTRAMUSCULAR; INTRAVENOUS EVERY 4 HOURS PRN
Status: DISCONTINUED | OUTPATIENT
Start: 2019-03-01 | End: 2019-03-07 | Stop reason: HOSPADM

## 2019-03-01 RX ORDER — OXYCODONE HYDROCHLORIDE AND ACETAMINOPHEN 5; 325 MG/1; MG/1
1 TABLET ORAL EVERY 6 HOURS PRN
Status: DISCONTINUED | OUTPATIENT
Start: 2019-03-01 | End: 2019-03-07 | Stop reason: HOSPADM

## 2019-03-01 RX ORDER — GABAPENTIN 400 MG/1
400 CAPSULE ORAL 3 TIMES DAILY
Status: DISCONTINUED | OUTPATIENT
Start: 2019-03-01 | End: 2019-03-07 | Stop reason: HOSPADM

## 2019-03-01 RX ORDER — DILTIAZEM HYDROCHLORIDE 240 MG/1
240 CAPSULE, COATED, EXTENDED RELEASE ORAL DAILY
Status: DISCONTINUED | OUTPATIENT
Start: 2019-03-01 | End: 2019-03-07 | Stop reason: HOSPADM

## 2019-03-01 RX ORDER — ATORVASTATIN CALCIUM 10 MG/1
10 TABLET, FILM COATED ORAL NIGHTLY
Status: DISCONTINUED | OUTPATIENT
Start: 2019-03-01 | End: 2019-03-07 | Stop reason: HOSPADM

## 2019-03-01 RX ORDER — PROPOFOL 10 MG/ML
INJECTION, EMULSION INTRAVENOUS PRN
Status: DISCONTINUED | OUTPATIENT
Start: 2019-03-01 | End: 2019-03-01 | Stop reason: SDUPTHER

## 2019-03-01 RX ORDER — TAMSULOSIN HYDROCHLORIDE 0.4 MG/1
0.4 CAPSULE ORAL DAILY
Status: DISCONTINUED | OUTPATIENT
Start: 2019-03-01 | End: 2019-03-07 | Stop reason: HOSPADM

## 2019-03-01 RX ORDER — SODIUM CHLORIDE 9 MG/ML
INJECTION, SOLUTION INTRAVENOUS CONTINUOUS
Status: DISCONTINUED | OUTPATIENT
Start: 2019-03-01 | End: 2019-03-05

## 2019-03-01 RX ORDER — RIVASTIGMINE 4.6 MG/24H
1 PATCH, EXTENDED RELEASE TRANSDERMAL DAILY
Status: DISCONTINUED | OUTPATIENT
Start: 2019-03-01 | End: 2019-03-07 | Stop reason: HOSPADM

## 2019-03-01 RX ORDER — SUCRALFATE 1 G/1
1 TABLET ORAL EVERY 8 HOURS SCHEDULED
Status: DISCONTINUED | OUTPATIENT
Start: 2019-03-01 | End: 2019-03-02

## 2019-03-01 RX ORDER — LEVALBUTEROL INHALATION SOLUTION 0.63 MG/3ML
0.63 SOLUTION RESPIRATORY (INHALATION) ONCE
Status: CANCELLED | OUTPATIENT
Start: 2019-03-01

## 2019-03-01 RX ADMIN — PROPOFOL 20 MG: 10 INJECTION, EMULSION INTRAVENOUS at 13:48

## 2019-03-01 RX ADMIN — LEVALBUTEROL 1.25 MG: 1.25 SOLUTION, CONCENTRATE RESPIRATORY (INHALATION) at 12:45

## 2019-03-01 RX ADMIN — PROPOFOL 20 MG: 10 INJECTION, EMULSION INTRAVENOUS at 13:54

## 2019-03-01 RX ADMIN — PROPOFOL 20 MG: 10 INJECTION, EMULSION INTRAVENOUS at 13:51

## 2019-03-01 RX ADMIN — LIDOCAINE HYDROCHLORIDE 20 MG: 20 INJECTION, SOLUTION INFILTRATION; PERINEURAL at 13:44

## 2019-03-01 RX ADMIN — SODIUM CHLORIDE, POTASSIUM CHLORIDE, SODIUM LACTATE AND CALCIUM CHLORIDE: 600; 310; 30; 20 INJECTION, SOLUTION INTRAVENOUS at 13:32

## 2019-03-01 RX ADMIN — SODIUM CHLORIDE: 9 INJECTION, SOLUTION INTRAVENOUS at 01:36

## 2019-03-01 RX ADMIN — Medication 10 ML: at 15:15

## 2019-03-01 RX ADMIN — PROPOFOL 40 MG: 10 INJECTION, EMULSION INTRAVENOUS at 13:44

## 2019-03-01 RX ADMIN — SODIUM CHLORIDE: 9 INJECTION, SOLUTION INTRAVENOUS at 20:02

## 2019-03-01 RX ADMIN — PROPOFOL 20 MG: 10 INJECTION, EMULSION INTRAVENOUS at 13:56

## 2019-03-01 RX ADMIN — PROPOFOL 40 MG: 10 INJECTION, EMULSION INTRAVENOUS at 13:46

## 2019-03-01 ASSESSMENT — PULMONARY FUNCTION TESTS
PIF_VALUE: 0

## 2019-03-01 ASSESSMENT — PAIN DESCRIPTION - ORIENTATION: ORIENTATION: OTHER (COMMENT)

## 2019-03-01 ASSESSMENT — PAIN DESCRIPTION - DESCRIPTORS: DESCRIPTORS: DULL

## 2019-03-01 ASSESSMENT — PAIN DESCRIPTION - FREQUENCY: FREQUENCY: INTERMITTENT

## 2019-03-01 ASSESSMENT — PAIN DESCRIPTION - LOCATION: LOCATION: CHEST

## 2019-03-01 ASSESSMENT — COPD QUESTIONNAIRES: CAT_SEVERITY: SEVERE

## 2019-03-01 ASSESSMENT — PAIN DESCRIPTION - ONSET: ONSET: SUDDEN

## 2019-03-01 ASSESSMENT — PAIN DESCRIPTION - PAIN TYPE: TYPE: ACUTE PAIN

## 2019-03-01 ASSESSMENT — PAIN SCALES - GENERAL: PAINLEVEL_OUTOF10: 4

## 2019-03-02 ENCOUNTER — APPOINTMENT (OUTPATIENT)
Dept: GENERAL RADIOLOGY | Age: 69
DRG: 813 | End: 2019-03-02
Payer: MEDICAID

## 2019-03-02 LAB
ANION GAP SERPL CALCULATED.3IONS-SCNC: 10 MMOL/L (ref 3–16)
BUN BLDV-MCNC: 12 MG/DL (ref 7–20)
CALCIUM SERPL-MCNC: 8.7 MG/DL (ref 8.3–10.6)
CHLORIDE BLD-SCNC: 102 MMOL/L (ref 99–110)
CO2: 27 MMOL/L (ref 21–32)
CREAT SERPL-MCNC: <0.5 MG/DL (ref 0.8–1.3)
EKG ATRIAL RATE: 85 BPM
EKG DIAGNOSIS: NORMAL
EKG P AXIS: 81 DEGREES
EKG P-R INTERVAL: 122 MS
EKG Q-T INTERVAL: 376 MS
EKG QRS DURATION: 72 MS
EKG QTC CALCULATION (BAZETT): 447 MS
EKG R AXIS: 73 DEGREES
EKG T AXIS: 69 DEGREES
EKG VENTRICULAR RATE: 85 BPM
GFR AFRICAN AMERICAN: >60
GFR NON-AFRICAN AMERICAN: >60
GLUCOSE BLD-MCNC: 121 MG/DL (ref 70–99)
HCT VFR BLD CALC: 29.2 % (ref 40.5–52.5)
HEMOGLOBIN: 9.2 G/DL (ref 13.5–17.5)
MAGNESIUM: 1.8 MG/DL (ref 1.8–2.4)
MCH RBC QN AUTO: 28.2 PG (ref 26–34)
MCHC RBC AUTO-ENTMCNC: 31.6 G/DL (ref 31–36)
MCV RBC AUTO: 89.1 FL (ref 80–100)
PDW BLD-RTO: 19 % (ref 12.4–15.4)
PLATELET # BLD: 268 K/UL (ref 135–450)
PMV BLD AUTO: 9.3 FL (ref 5–10.5)
POTASSIUM SERPL-SCNC: 3.7 MMOL/L (ref 3.5–5.1)
RBC # BLD: 3.28 M/UL (ref 4.2–5.9)
SODIUM BLD-SCNC: 139 MMOL/L (ref 136–145)
TROPONIN: <0.01 NG/ML
WBC # BLD: 16.9 K/UL (ref 4–11)

## 2019-03-02 PROCEDURE — 93005 ELECTROCARDIOGRAM TRACING: CPT | Performed by: INTERNAL MEDICINE

## 2019-03-02 PROCEDURE — 6360000002 HC RX W HCPCS: Performed by: HOSPITALIST

## 2019-03-02 PROCEDURE — 84484 ASSAY OF TROPONIN QUANT: CPT

## 2019-03-02 PROCEDURE — G0378 HOSPITAL OBSERVATION PER HR: HCPCS

## 2019-03-02 PROCEDURE — 36415 COLL VENOUS BLD VENIPUNCTURE: CPT

## 2019-03-02 PROCEDURE — 71045 X-RAY EXAM CHEST 1 VIEW: CPT

## 2019-03-02 PROCEDURE — 96375 TX/PRO/DX INJ NEW DRUG ADDON: CPT

## 2019-03-02 PROCEDURE — 85027 COMPLETE CBC AUTOMATED: CPT

## 2019-03-02 PROCEDURE — 96376 TX/PRO/DX INJ SAME DRUG ADON: CPT

## 2019-03-02 PROCEDURE — 93010 ELECTROCARDIOGRAM REPORT: CPT | Performed by: INTERNAL MEDICINE

## 2019-03-02 PROCEDURE — 80048 BASIC METABOLIC PNL TOTAL CA: CPT

## 2019-03-02 PROCEDURE — 6370000000 HC RX 637 (ALT 250 FOR IP): Performed by: INTERNAL MEDICINE

## 2019-03-02 PROCEDURE — 82607 VITAMIN B-12: CPT

## 2019-03-02 PROCEDURE — 1200000000 HC SEMI PRIVATE

## 2019-03-02 PROCEDURE — 6370000000 HC RX 637 (ALT 250 FOR IP): Performed by: HOSPITALIST

## 2019-03-02 PROCEDURE — 94761 N-INVAS EAR/PLS OXIMETRY MLT: CPT

## 2019-03-02 PROCEDURE — 83735 ASSAY OF MAGNESIUM: CPT

## 2019-03-02 PROCEDURE — 2580000003 HC RX 258: Performed by: INTERNAL MEDICINE

## 2019-03-02 PROCEDURE — C9113 INJ PANTOPRAZOLE SODIUM, VIA: HCPCS | Performed by: HOSPITALIST

## 2019-03-02 PROCEDURE — 96365 THER/PROPH/DIAG IV INF INIT: CPT

## 2019-03-02 PROCEDURE — 2580000003 HC RX 258: Performed by: HOSPITALIST

## 2019-03-02 PROCEDURE — 2700000000 HC OXYGEN THERAPY PER DAY

## 2019-03-02 RX ORDER — PANTOPRAZOLE SODIUM 40 MG/10ML
40 INJECTION, POWDER, LYOPHILIZED, FOR SOLUTION INTRAVENOUS 2 TIMES DAILY
Status: DISCONTINUED | OUTPATIENT
Start: 2019-03-02 | End: 2019-03-07 | Stop reason: HOSPADM

## 2019-03-02 RX ORDER — CEFTRIAXONE SODIUM 10 G/100ML
1 INJECTION, POWDER, FOR SOLUTION INTRAVENOUS ONCE
Status: DISCONTINUED | OUTPATIENT
Start: 2019-03-02 | End: 2019-03-02

## 2019-03-02 RX ORDER — MAGNESIUM HYDROXIDE/ALUMINUM HYDROXICE/SIMETHICONE 120; 1200; 1200 MG/30ML; MG/30ML; MG/30ML
30 SUSPENSION ORAL ONCE
Status: COMPLETED | OUTPATIENT
Start: 2019-03-02 | End: 2019-03-02

## 2019-03-02 RX ORDER — SODIUM CHLORIDE 0.9 % (FLUSH) 0.9 %
10 SYRINGE (ML) INJECTION 2 TIMES DAILY
Status: DISCONTINUED | OUTPATIENT
Start: 2019-03-02 | End: 2019-03-07 | Stop reason: HOSPADM

## 2019-03-02 RX ADMIN — ATORVASTATIN CALCIUM 10 MG: 10 TABLET, FILM COATED ORAL at 20:23

## 2019-03-02 RX ADMIN — SODIUM CHLORIDE: 9 INJECTION, SOLUTION INTRAVENOUS at 20:22

## 2019-03-02 RX ADMIN — SODIUM CHLORIDE: 9 INJECTION, SOLUTION INTRAVENOUS at 18:00

## 2019-03-02 RX ADMIN — Medication 10 ML: at 09:56

## 2019-03-02 RX ADMIN — ALUMINUM HYDROXIDE, MAGNESIUM HYDROXIDE, AND SIMETHICONE 30 ML: 200; 200; 20 SUSPENSION ORAL at 09:56

## 2019-03-02 RX ADMIN — CEFTRIAXONE SODIUM 1 G: 1 INJECTION, POWDER, FOR SOLUTION INTRAMUSCULAR; INTRAVENOUS at 12:53

## 2019-03-02 RX ADMIN — PANTOPRAZOLE SODIUM 40 MG: 40 INJECTION, POWDER, FOR SOLUTION INTRAVENOUS at 09:57

## 2019-03-02 RX ADMIN — PANTOPRAZOLE SODIUM 40 MG: 40 INJECTION, POWDER, FOR SOLUTION INTRAVENOUS at 20:22

## 2019-03-02 RX ADMIN — Medication 10 ML: at 20:23

## 2019-03-02 RX ADMIN — OXYCODONE HYDROCHLORIDE AND ACETAMINOPHEN 1 TABLET: 5; 325 TABLET ORAL at 09:57

## 2019-03-02 RX ADMIN — GABAPENTIN 400 MG: 400 CAPSULE ORAL at 20:23

## 2019-03-02 ASSESSMENT — PAIN DESCRIPTION - LOCATION
LOCATION: CHEST
LOCATION: CHEST

## 2019-03-02 ASSESSMENT — PAIN DESCRIPTION - FREQUENCY
FREQUENCY: CONTINUOUS
FREQUENCY: CONTINUOUS

## 2019-03-02 ASSESSMENT — PAIN DESCRIPTION - ONSET
ONSET: GRADUAL
ONSET: GRADUAL

## 2019-03-02 ASSESSMENT — PAIN DESCRIPTION - DESCRIPTORS
DESCRIPTORS: HEAVINESS;PRESSURE
DESCRIPTORS: HEADACHE;PRESSURE

## 2019-03-02 ASSESSMENT — PAIN SCALES - GENERAL
PAINLEVEL_OUTOF10: 7
PAINLEVEL_OUTOF10: 5

## 2019-03-02 ASSESSMENT — PAIN DESCRIPTION - PAIN TYPE
TYPE: ACUTE PAIN
TYPE: ACUTE PAIN

## 2019-03-03 LAB
URINE CULTURE, ROUTINE: NORMAL
VITAMIN B-12: 526 PG/ML (ref 211–911)

## 2019-03-03 PROCEDURE — 87205 SMEAR GRAM STAIN: CPT

## 2019-03-03 PROCEDURE — 6370000000 HC RX 637 (ALT 250 FOR IP): Performed by: HOSPITALIST

## 2019-03-03 PROCEDURE — 2580000003 HC RX 258: Performed by: HOSPITALIST

## 2019-03-03 PROCEDURE — 94640 AIRWAY INHALATION TREATMENT: CPT

## 2019-03-03 PROCEDURE — 2500000003 HC RX 250 WO HCPCS: Performed by: HOSPITALIST

## 2019-03-03 PROCEDURE — 1200000000 HC SEMI PRIVATE

## 2019-03-03 PROCEDURE — 2700000000 HC OXYGEN THERAPY PER DAY

## 2019-03-03 PROCEDURE — 6370000000 HC RX 637 (ALT 250 FOR IP): Performed by: INTERNAL MEDICINE

## 2019-03-03 PROCEDURE — 94761 N-INVAS EAR/PLS OXIMETRY MLT: CPT

## 2019-03-03 PROCEDURE — C9113 INJ PANTOPRAZOLE SODIUM, VIA: HCPCS | Performed by: HOSPITALIST

## 2019-03-03 PROCEDURE — 99255 IP/OBS CONSLTJ NEW/EST HI 80: CPT | Performed by: PSYCHIATRY & NEUROLOGY

## 2019-03-03 PROCEDURE — 6360000002 HC RX W HCPCS: Performed by: HOSPITALIST

## 2019-03-03 PROCEDURE — 87070 CULTURE OTHR SPECIMN AEROBIC: CPT

## 2019-03-03 PROCEDURE — 2580000003 HC RX 258: Performed by: INTERNAL MEDICINE

## 2019-03-03 RX ORDER — SODIUM CHLORIDE 9 MG/ML
INJECTION, SOLUTION INTRAVENOUS CONTINUOUS
Status: DISCONTINUED | OUTPATIENT
Start: 2019-03-03 | End: 2019-03-05

## 2019-03-03 RX ORDER — IPRATROPIUM BROMIDE AND ALBUTEROL SULFATE 2.5; .5 MG/3ML; MG/3ML
1 SOLUTION RESPIRATORY (INHALATION) ONCE
Status: COMPLETED | OUTPATIENT
Start: 2019-03-03 | End: 2019-03-03

## 2019-03-03 RX ORDER — CEFTRIAXONE 1 G/1
1 INJECTION, POWDER, FOR SOLUTION INTRAMUSCULAR; INTRAVENOUS EVERY 24 HOURS
Status: DISCONTINUED | OUTPATIENT
Start: 2019-03-03 | End: 2019-03-03

## 2019-03-03 RX ADMIN — PANTOPRAZOLE SODIUM 40 MG: 40 INJECTION, POWDER, FOR SOLUTION INTRAVENOUS at 08:26

## 2019-03-03 RX ADMIN — Medication 10 ML: at 08:27

## 2019-03-03 RX ADMIN — PANTOPRAZOLE SODIUM 40 MG: 40 INJECTION, POWDER, FOR SOLUTION INTRAVENOUS at 21:37

## 2019-03-03 RX ADMIN — SODIUM CHLORIDE: 9 INJECTION, SOLUTION INTRAVENOUS at 08:30

## 2019-03-03 RX ADMIN — Medication 10 ML: at 21:37

## 2019-03-03 RX ADMIN — Medication 10 ML: at 08:26

## 2019-03-03 RX ADMIN — MAGNESIUM HYDROXIDE 30 ML: 400 SUSPENSION ORAL at 11:34

## 2019-03-03 RX ADMIN — SODIUM CHLORIDE: 9 INJECTION, SOLUTION INTRAVENOUS at 21:38

## 2019-03-03 RX ADMIN — IPRATROPIUM BROMIDE AND ALBUTEROL SULFATE 1 AMPULE: .5; 3 SOLUTION RESPIRATORY (INHALATION) at 15:32

## 2019-03-03 RX ADMIN — DOXYCYCLINE 100 MG: 100 INJECTION, POWDER, LYOPHILIZED, FOR SOLUTION INTRAVENOUS at 15:50

## 2019-03-04 ENCOUNTER — APPOINTMENT (OUTPATIENT)
Dept: GENERAL RADIOLOGY | Age: 69
DRG: 813 | End: 2019-03-04
Payer: MEDICAID

## 2019-03-04 ENCOUNTER — ANESTHESIA EVENT (OUTPATIENT)
Dept: ENDOSCOPY | Age: 69
DRG: 813 | End: 2019-03-04
Payer: MEDICAID

## 2019-03-04 ENCOUNTER — ANESTHESIA (OUTPATIENT)
Dept: ENDOSCOPY | Age: 69
DRG: 813 | End: 2019-03-04
Payer: MEDICAID

## 2019-03-04 VITALS
DIASTOLIC BLOOD PRESSURE: 63 MMHG | RESPIRATION RATE: 25 BRPM | OXYGEN SATURATION: 96 % | SYSTOLIC BLOOD PRESSURE: 106 MMHG

## 2019-03-04 PROCEDURE — 6360000002 HC RX W HCPCS: Performed by: HOSPITALIST

## 2019-03-04 PROCEDURE — 2580000003 HC RX 258: Performed by: HOSPITALIST

## 2019-03-04 PROCEDURE — C9113 INJ PANTOPRAZOLE SODIUM, VIA: HCPCS | Performed by: HOSPITALIST

## 2019-03-04 PROCEDURE — 0D758ZZ DILATION OF ESOPHAGUS, VIA NATURAL OR ARTIFICIAL OPENING ENDOSCOPIC: ICD-10-PCS | Performed by: INTERNAL MEDICINE

## 2019-03-04 PROCEDURE — 2709999900 HC NON-CHARGEABLE SUPPLY: Performed by: INTERNAL MEDICINE

## 2019-03-04 PROCEDURE — 3700000000 HC ANESTHESIA ATTENDED CARE: Performed by: INTERNAL MEDICINE

## 2019-03-04 PROCEDURE — 88342 IMHCHEM/IMCYTCHM 1ST ANTB: CPT

## 2019-03-04 PROCEDURE — 3700000001 HC ADD 15 MINUTES (ANESTHESIA): Performed by: INTERNAL MEDICINE

## 2019-03-04 PROCEDURE — 92610 EVALUATE SWALLOWING FUNCTION: CPT

## 2019-03-04 PROCEDURE — 2500000003 HC RX 250 WO HCPCS: Performed by: HOSPITALIST

## 2019-03-04 PROCEDURE — 92526 ORAL FUNCTION THERAPY: CPT

## 2019-03-04 PROCEDURE — 6360000002 HC RX W HCPCS: Performed by: INTERNAL MEDICINE

## 2019-03-04 PROCEDURE — 2580000003 HC RX 258: Performed by: INTERNAL MEDICINE

## 2019-03-04 PROCEDURE — 0DB58ZX EXCISION OF ESOPHAGUS, VIA NATURAL OR ARTIFICIAL OPENING ENDOSCOPIC, DIAGNOSTIC: ICD-10-PCS | Performed by: INTERNAL MEDICINE

## 2019-03-04 PROCEDURE — 99232 SBSQ HOSP IP/OBS MODERATE 35: CPT | Performed by: INTERNAL MEDICINE

## 2019-03-04 PROCEDURE — 2580000003 HC RX 258: Performed by: NURSE ANESTHETIST, CERTIFIED REGISTERED

## 2019-03-04 PROCEDURE — 3609012400 HC EGD TRANSORAL BIOPSY SINGLE/MULTIPLE: Performed by: INTERNAL MEDICINE

## 2019-03-04 PROCEDURE — 94150 VITAL CAPACITY TEST: CPT

## 2019-03-04 PROCEDURE — 3609012500 HC EGD DILATION BALLOON: Performed by: INTERNAL MEDICINE

## 2019-03-04 PROCEDURE — 2700000000 HC OXYGEN THERAPY PER DAY

## 2019-03-04 PROCEDURE — 74220 X-RAY XM ESOPHAGUS 1CNTRST: CPT

## 2019-03-04 PROCEDURE — 7100000010 HC PHASE II RECOVERY - FIRST 15 MIN: Performed by: INTERNAL MEDICINE

## 2019-03-04 PROCEDURE — 88341 IMHCHEM/IMCYTCHM EA ADD ANTB: CPT

## 2019-03-04 PROCEDURE — 1200000000 HC SEMI PRIVATE

## 2019-03-04 PROCEDURE — 2500000003 HC RX 250 WO HCPCS: Performed by: NURSE ANESTHETIST, CERTIFIED REGISTERED

## 2019-03-04 PROCEDURE — 6360000002 HC RX W HCPCS: Performed by: NURSE ANESTHETIST, CERTIFIED REGISTERED

## 2019-03-04 PROCEDURE — 88313 SPECIAL STAINS GROUP 2: CPT

## 2019-03-04 PROCEDURE — 6360000004 HC RX CONTRAST MEDICATION: Performed by: INTERNAL MEDICINE

## 2019-03-04 PROCEDURE — 3609013800 HC EGD SUBMUCOSAL/BOTOX INJECTION: Performed by: INTERNAL MEDICINE

## 2019-03-04 PROCEDURE — 88305 TISSUE EXAM BY PATHOLOGIST: CPT

## 2019-03-04 PROCEDURE — C1726 CATH, BAL DIL, NON-VASCULAR: HCPCS | Performed by: INTERNAL MEDICINE

## 2019-03-04 PROCEDURE — 94761 N-INVAS EAR/PLS OXIMETRY MLT: CPT

## 2019-03-04 RX ORDER — OXYCODONE HYDROCHLORIDE AND ACETAMINOPHEN 5; 325 MG/1; MG/1
1 TABLET ORAL PRN
Status: DISCONTINUED | OUTPATIENT
Start: 2019-03-04 | End: 2019-03-04

## 2019-03-04 RX ORDER — MEPERIDINE HYDROCHLORIDE 25 MG/ML
12.5 INJECTION INTRAMUSCULAR; INTRAVENOUS; SUBCUTANEOUS EVERY 5 MIN PRN
Status: DISCONTINUED | OUTPATIENT
Start: 2019-03-04 | End: 2019-03-04

## 2019-03-04 RX ORDER — MORPHINE SULFATE 10 MG/ML
2 INJECTION, SOLUTION INTRAMUSCULAR; INTRAVENOUS EVERY 5 MIN PRN
Status: DISCONTINUED | OUTPATIENT
Start: 2019-03-04 | End: 2019-03-04

## 2019-03-04 RX ORDER — TRIAMCINOLONE ACETONIDE 40 MG/ML
40 INJECTION, SUSPENSION INTRA-ARTICULAR; INTRAMUSCULAR ONCE
Status: CANCELLED | OUTPATIENT
Start: 2019-03-04 | End: 2019-03-04

## 2019-03-04 RX ORDER — HYDROMORPHONE HCL 110MG/55ML
0.5 PATIENT CONTROLLED ANALGESIA SYRINGE INTRAVENOUS EVERY 5 MIN PRN
Status: DISCONTINUED | OUTPATIENT
Start: 2019-03-04 | End: 2019-03-04

## 2019-03-04 RX ORDER — LABETALOL HYDROCHLORIDE 5 MG/ML
5 INJECTION, SOLUTION INTRAVENOUS EVERY 10 MIN PRN
Status: DISCONTINUED | OUTPATIENT
Start: 2019-03-04 | End: 2019-03-04

## 2019-03-04 RX ORDER — ONDANSETRON 2 MG/ML
4 INJECTION INTRAMUSCULAR; INTRAVENOUS
Status: DISCONTINUED | OUTPATIENT
Start: 2019-03-04 | End: 2019-03-04

## 2019-03-04 RX ORDER — OXYCODONE HYDROCHLORIDE AND ACETAMINOPHEN 5; 325 MG/1; MG/1
2 TABLET ORAL PRN
Status: DISCONTINUED | OUTPATIENT
Start: 2019-03-04 | End: 2019-03-04

## 2019-03-04 RX ORDER — LIDOCAINE HYDROCHLORIDE 20 MG/ML
INJECTION, SOLUTION INFILTRATION; PERINEURAL PRN
Status: DISCONTINUED | OUTPATIENT
Start: 2019-03-04 | End: 2019-03-04 | Stop reason: SDUPTHER

## 2019-03-04 RX ORDER — DIPHENHYDRAMINE HYDROCHLORIDE 50 MG/ML
12.5 INJECTION INTRAMUSCULAR; INTRAVENOUS
Status: DISCONTINUED | OUTPATIENT
Start: 2019-03-04 | End: 2019-03-04

## 2019-03-04 RX ORDER — MORPHINE SULFATE 10 MG/ML
1 INJECTION, SOLUTION INTRAMUSCULAR; INTRAVENOUS EVERY 5 MIN PRN
Status: DISCONTINUED | OUTPATIENT
Start: 2019-03-04 | End: 2019-03-04

## 2019-03-04 RX ORDER — HYDRALAZINE HYDROCHLORIDE 20 MG/ML
5 INJECTION INTRAMUSCULAR; INTRAVENOUS EVERY 10 MIN PRN
Status: DISCONTINUED | OUTPATIENT
Start: 2019-03-04 | End: 2019-03-04

## 2019-03-04 RX ORDER — PROPOFOL 10 MG/ML
INJECTION, EMULSION INTRAVENOUS PRN
Status: DISCONTINUED | OUTPATIENT
Start: 2019-03-04 | End: 2019-03-04 | Stop reason: SDUPTHER

## 2019-03-04 RX ORDER — HYDROMORPHONE HCL 110MG/55ML
0.25 PATIENT CONTROLLED ANALGESIA SYRINGE INTRAVENOUS EVERY 5 MIN PRN
Status: DISCONTINUED | OUTPATIENT
Start: 2019-03-04 | End: 2019-03-04

## 2019-03-04 RX ORDER — SODIUM CHLORIDE 9 MG/ML
INJECTION, SOLUTION INTRAVENOUS CONTINUOUS PRN
Status: DISCONTINUED | OUTPATIENT
Start: 2019-03-04 | End: 2019-03-04 | Stop reason: SDUPTHER

## 2019-03-04 RX ORDER — LORAZEPAM 2 MG/ML
0.5 INJECTION INTRAMUSCULAR ONCE
Status: COMPLETED | OUTPATIENT
Start: 2019-03-04 | End: 2019-03-04

## 2019-03-04 RX ORDER — PROMETHAZINE HYDROCHLORIDE 25 MG/ML
6.25 INJECTION, SOLUTION INTRAMUSCULAR; INTRAVENOUS
Status: DISCONTINUED | OUTPATIENT
Start: 2019-03-04 | End: 2019-03-04

## 2019-03-04 RX ADMIN — Medication 10 ML: at 19:46

## 2019-03-04 RX ADMIN — PROPOFOL 180 MG: 10 INJECTION, EMULSION INTRAVENOUS at 15:53

## 2019-03-04 RX ADMIN — LIDOCAINE HYDROCHLORIDE 60 MG: 20 INJECTION, SOLUTION INFILTRATION; PERINEURAL at 15:58

## 2019-03-04 RX ADMIN — IOHEXOL 200 ML: 240 INJECTION, SOLUTION INTRATHECAL; INTRAVASCULAR; INTRAVENOUS; ORAL at 10:11

## 2019-03-04 RX ADMIN — DOXYCYCLINE 100 MG: 100 INJECTION, POWDER, LYOPHILIZED, FOR SOLUTION INTRAVENOUS at 17:41

## 2019-03-04 RX ADMIN — PANTOPRAZOLE SODIUM 40 MG: 40 INJECTION, POWDER, FOR SOLUTION INTRAVENOUS at 08:46

## 2019-03-04 RX ADMIN — LORAZEPAM 0.5 MG: 2 INJECTION, SOLUTION INTRAMUSCULAR; INTRAVENOUS at 19:46

## 2019-03-04 RX ADMIN — PANTOPRAZOLE SODIUM 40 MG: 40 INJECTION, POWDER, FOR SOLUTION INTRAVENOUS at 19:46

## 2019-03-04 RX ADMIN — Medication 10 ML: at 19:47

## 2019-03-04 RX ADMIN — SODIUM CHLORIDE: 9 INJECTION, SOLUTION INTRAVENOUS at 11:32

## 2019-03-04 RX ADMIN — SODIUM CHLORIDE: 900 INJECTION INTRAVENOUS at 15:54

## 2019-03-04 RX ADMIN — DOXYCYCLINE 100 MG: 100 INJECTION, POWDER, LYOPHILIZED, FOR SOLUTION INTRAVENOUS at 03:12

## 2019-03-04 RX ADMIN — Medication 10 ML: at 08:46

## 2019-03-04 ASSESSMENT — PAIN SCALES - GENERAL: PAINLEVEL_OUTOF10: 0

## 2019-03-04 ASSESSMENT — LIFESTYLE VARIABLES: SMOKING_STATUS: 1

## 2019-03-05 ENCOUNTER — APPOINTMENT (OUTPATIENT)
Dept: GENERAL RADIOLOGY | Age: 69
DRG: 813 | End: 2019-03-05
Payer: MEDICAID

## 2019-03-05 LAB
CULTURE, RESPIRATORY: NORMAL
GRAM STAIN RESULT: NORMAL

## 2019-03-05 PROCEDURE — 2580000003 HC RX 258: Performed by: HOSPITALIST

## 2019-03-05 PROCEDURE — 2500000003 HC RX 250 WO HCPCS: Performed by: HOSPITALIST

## 2019-03-05 PROCEDURE — C9113 INJ PANTOPRAZOLE SODIUM, VIA: HCPCS | Performed by: HOSPITALIST

## 2019-03-05 PROCEDURE — 92611 MOTION FLUOROSCOPY/SWALLOW: CPT

## 2019-03-05 PROCEDURE — 1200000000 HC SEMI PRIVATE

## 2019-03-05 PROCEDURE — 94761 N-INVAS EAR/PLS OXIMETRY MLT: CPT

## 2019-03-05 PROCEDURE — 92526 ORAL FUNCTION THERAPY: CPT

## 2019-03-05 PROCEDURE — 74230 X-RAY XM SWLNG FUNCJ C+: CPT

## 2019-03-05 PROCEDURE — 6360000002 HC RX W HCPCS: Performed by: HOSPITALIST

## 2019-03-05 PROCEDURE — 99232 SBSQ HOSP IP/OBS MODERATE 35: CPT | Performed by: INTERNAL MEDICINE

## 2019-03-05 PROCEDURE — 92610 EVALUATE SWALLOWING FUNCTION: CPT

## 2019-03-05 PROCEDURE — 2700000000 HC OXYGEN THERAPY PER DAY

## 2019-03-05 RX ADMIN — DOXYCYCLINE 100 MG: 100 INJECTION, POWDER, LYOPHILIZED, FOR SOLUTION INTRAVENOUS at 13:47

## 2019-03-05 RX ADMIN — Medication 10 ML: at 07:52

## 2019-03-05 RX ADMIN — Medication 10 ML: at 21:16

## 2019-03-05 RX ADMIN — PANTOPRAZOLE SODIUM 40 MG: 40 INJECTION, POWDER, FOR SOLUTION INTRAVENOUS at 07:51

## 2019-03-05 RX ADMIN — SODIUM CHLORIDE: 9 INJECTION, SOLUTION INTRAVENOUS at 02:05

## 2019-03-05 RX ADMIN — DOXYCYCLINE 100 MG: 100 INJECTION, POWDER, LYOPHILIZED, FOR SOLUTION INTRAVENOUS at 02:05

## 2019-03-05 RX ADMIN — PANTOPRAZOLE SODIUM 40 MG: 40 INJECTION, POWDER, FOR SOLUTION INTRAVENOUS at 21:15

## 2019-03-06 LAB
ANION GAP SERPL CALCULATED.3IONS-SCNC: 8 MMOL/L (ref 3–16)
BLOOD CULTURE, ROUTINE: NORMAL
BUN BLDV-MCNC: 11 MG/DL (ref 7–20)
CALCIUM SERPL-MCNC: 8.7 MG/DL (ref 8.3–10.6)
CHLORIDE BLD-SCNC: 102 MMOL/L (ref 99–110)
CO2: 30 MMOL/L (ref 21–32)
CREAT SERPL-MCNC: <0.5 MG/DL (ref 0.8–1.3)
CULTURE, BLOOD 2: NORMAL
GFR AFRICAN AMERICAN: >60
GFR NON-AFRICAN AMERICAN: >60
GLUCOSE BLD-MCNC: 97 MG/DL (ref 70–99)
HCT VFR BLD CALC: 30.1 % (ref 40.5–52.5)
HEMOGLOBIN: 9.8 G/DL (ref 13.5–17.5)
MAGNESIUM: 1.7 MG/DL (ref 1.8–2.4)
MCH RBC QN AUTO: 28.6 PG (ref 26–34)
MCHC RBC AUTO-ENTMCNC: 32.6 G/DL (ref 31–36)
MCV RBC AUTO: 87.9 FL (ref 80–100)
PDW BLD-RTO: 17.9 % (ref 12.4–15.4)
PHOSPHORUS: 2.5 MG/DL (ref 2.5–4.9)
PLATELET # BLD: 306 K/UL (ref 135–450)
PMV BLD AUTO: 8.2 FL (ref 5–10.5)
POTASSIUM SERPL-SCNC: 3.7 MMOL/L (ref 3.5–5.1)
RBC # BLD: 3.43 M/UL (ref 4.2–5.9)
SODIUM BLD-SCNC: 140 MMOL/L (ref 136–145)
WBC # BLD: 7.4 K/UL (ref 4–11)

## 2019-03-06 PROCEDURE — 2580000003 HC RX 258: Performed by: INTERNAL MEDICINE

## 2019-03-06 PROCEDURE — 2500000003 HC RX 250 WO HCPCS: Performed by: HOSPITALIST

## 2019-03-06 PROCEDURE — 84100 ASSAY OF PHOSPHORUS: CPT

## 2019-03-06 PROCEDURE — 2700000000 HC OXYGEN THERAPY PER DAY

## 2019-03-06 PROCEDURE — C9113 INJ PANTOPRAZOLE SODIUM, VIA: HCPCS | Performed by: HOSPITALIST

## 2019-03-06 PROCEDURE — 92526 ORAL FUNCTION THERAPY: CPT

## 2019-03-06 PROCEDURE — 6360000002 HC RX W HCPCS: Performed by: HOSPITALIST

## 2019-03-06 PROCEDURE — 83735 ASSAY OF MAGNESIUM: CPT

## 2019-03-06 PROCEDURE — 97166 OT EVAL MOD COMPLEX 45 MIN: CPT

## 2019-03-06 PROCEDURE — 1200000000 HC SEMI PRIVATE

## 2019-03-06 PROCEDURE — 94761 N-INVAS EAR/PLS OXIMETRY MLT: CPT

## 2019-03-06 PROCEDURE — 6370000000 HC RX 637 (ALT 250 FOR IP): Performed by: INTERNAL MEDICINE

## 2019-03-06 PROCEDURE — 85027 COMPLETE CBC AUTOMATED: CPT

## 2019-03-06 PROCEDURE — 99232 SBSQ HOSP IP/OBS MODERATE 35: CPT | Performed by: INTERNAL MEDICINE

## 2019-03-06 PROCEDURE — 36415 COLL VENOUS BLD VENIPUNCTURE: CPT

## 2019-03-06 PROCEDURE — 97530 THERAPEUTIC ACTIVITIES: CPT

## 2019-03-06 PROCEDURE — 97535 SELF CARE MNGMENT TRAINING: CPT

## 2019-03-06 PROCEDURE — 2580000003 HC RX 258: Performed by: HOSPITALIST

## 2019-03-06 PROCEDURE — 97161 PT EVAL LOW COMPLEX 20 MIN: CPT

## 2019-03-06 PROCEDURE — 97116 GAIT TRAINING THERAPY: CPT

## 2019-03-06 PROCEDURE — 80048 BASIC METABOLIC PNL TOTAL CA: CPT

## 2019-03-06 RX ORDER — DOXYCYCLINE HYCLATE 100 MG
100 TABLET ORAL 2 TIMES DAILY
Qty: 4 TABLET | Refills: 0 | Status: SHIPPED | OUTPATIENT
Start: 2019-03-06 | End: 2019-03-08

## 2019-03-06 RX ORDER — LORAZEPAM 1 MG/1
1 TABLET ORAL DAILY PRN
Qty: 6 TABLET | Refills: 0 | Status: SHIPPED | OUTPATIENT
Start: 2019-03-06 | End: 2019-03-09

## 2019-03-06 RX ORDER — OXYCODONE HYDROCHLORIDE AND ACETAMINOPHEN 5; 325 MG/1; MG/1
1 TABLET ORAL EVERY 6 HOURS PRN
Qty: 6 TABLET | Refills: 0 | Status: SHIPPED | OUTPATIENT
Start: 2019-03-06 | End: 2019-03-09

## 2019-03-06 RX ADMIN — OXYCODONE HYDROCHLORIDE AND ACETAMINOPHEN 1 TABLET: 5; 325 TABLET ORAL at 09:45

## 2019-03-06 RX ADMIN — DOXYCYCLINE 100 MG: 100 INJECTION, POWDER, LYOPHILIZED, FOR SOLUTION INTRAVENOUS at 13:43

## 2019-03-06 RX ADMIN — Medication 10 ML: at 09:44

## 2019-03-06 RX ADMIN — Medication 10 ML: at 09:41

## 2019-03-06 RX ADMIN — CITALOPRAM HYDROBROMIDE 10 MG: 20 TABLET ORAL at 13:49

## 2019-03-06 RX ADMIN — DILTIAZEM HYDROCHLORIDE 240 MG: 240 CAPSULE, COATED, EXTENDED RELEASE ORAL at 13:50

## 2019-03-06 RX ADMIN — GABAPENTIN 400 MG: 400 CAPSULE ORAL at 13:47

## 2019-03-06 RX ADMIN — DOXYCYCLINE 100 MG: 100 INJECTION, POWDER, LYOPHILIZED, FOR SOLUTION INTRAVENOUS at 02:48

## 2019-03-06 RX ADMIN — ASPIRIN 81 MG 81 MG: 81 TABLET ORAL at 13:48

## 2019-03-06 ASSESSMENT — PAIN DESCRIPTION - PROGRESSION
CLINICAL_PROGRESSION: GRADUALLY WORSENING
CLINICAL_PROGRESSION: GRADUALLY IMPROVING
CLINICAL_PROGRESSION: GRADUALLY WORSENING

## 2019-03-06 ASSESSMENT — PAIN DESCRIPTION - ORIENTATION
ORIENTATION: RIGHT;LEFT
ORIENTATION: RIGHT;LEFT

## 2019-03-06 ASSESSMENT — PAIN SCALES - GENERAL
PAINLEVEL_OUTOF10: 9
PAINLEVEL_OUTOF10: 9
PAINLEVEL_OUTOF10: 7

## 2019-03-06 ASSESSMENT — PAIN DESCRIPTION - DESCRIPTORS
DESCRIPTORS: ACHING;CONSTANT
DESCRIPTORS: ACHING;CONSTANT;DISCOMFORT

## 2019-03-06 ASSESSMENT — PAIN DESCRIPTION - ONSET
ONSET: ON-GOING
ONSET: ON-GOING

## 2019-03-06 ASSESSMENT — PAIN DESCRIPTION - PAIN TYPE
TYPE: CHRONIC PAIN
TYPE: CHRONIC PAIN

## 2019-03-06 ASSESSMENT — PAIN DESCRIPTION - LOCATION
LOCATION: SHOULDER
LOCATION: SHOULDER

## 2019-03-06 ASSESSMENT — PAIN DESCRIPTION - FREQUENCY
FREQUENCY: CONTINUOUS
FREQUENCY: CONTINUOUS

## 2019-03-07 VITALS
HEART RATE: 77 BPM | DIASTOLIC BLOOD PRESSURE: 70 MMHG | HEIGHT: 65 IN | SYSTOLIC BLOOD PRESSURE: 110 MMHG | TEMPERATURE: 98.6 F | RESPIRATION RATE: 16 BRPM | OXYGEN SATURATION: 98 % | WEIGHT: 120.1 LBS | BODY MASS INDEX: 20.01 KG/M2

## 2019-03-07 PROCEDURE — 6370000000 HC RX 637 (ALT 250 FOR IP): Performed by: INTERNAL MEDICINE

## 2019-03-07 PROCEDURE — 2580000003 HC RX 258: Performed by: HOSPITALIST

## 2019-03-07 PROCEDURE — 92526 ORAL FUNCTION THERAPY: CPT

## 2019-03-07 PROCEDURE — 2500000003 HC RX 250 WO HCPCS: Performed by: HOSPITALIST

## 2019-03-07 PROCEDURE — 99238 HOSP IP/OBS DSCHRG MGMT 30/<: CPT | Performed by: INTERNAL MEDICINE

## 2019-03-07 RX ADMIN — GABAPENTIN 400 MG: 400 CAPSULE ORAL at 13:44

## 2019-03-07 RX ADMIN — DOXYCYCLINE 100 MG: 100 INJECTION, POWDER, LYOPHILIZED, FOR SOLUTION INTRAVENOUS at 02:41

## 2019-03-07 RX ADMIN — OXYCODONE HYDROCHLORIDE AND ACETAMINOPHEN 1 TABLET: 5; 325 TABLET ORAL at 13:44

## 2019-03-07 RX ADMIN — OXYCODONE HYDROCHLORIDE AND ACETAMINOPHEN 1 TABLET: 5; 325 TABLET ORAL at 06:16

## 2019-03-07 RX ADMIN — DOXYCYCLINE 100 MG: 100 INJECTION, POWDER, LYOPHILIZED, FOR SOLUTION INTRAVENOUS at 14:36

## 2019-03-07 ASSESSMENT — PAIN SCALES - GENERAL
PAINLEVEL_OUTOF10: 8
PAINLEVEL_OUTOF10: 8

## 2019-04-01 ENCOUNTER — HOSPITAL ENCOUNTER (INPATIENT)
Age: 69
LOS: 3 days | Discharge: HOME HEALTH | DRG: 243 | End: 2019-04-04
Attending: EMERGENCY MEDICINE | Admitting: INTERNAL MEDICINE
Payer: MEDICAID

## 2019-04-01 ENCOUNTER — APPOINTMENT (OUTPATIENT)
Dept: CT IMAGING | Age: 69
DRG: 243 | End: 2019-04-01
Payer: MEDICAID

## 2019-04-01 ENCOUNTER — APPOINTMENT (OUTPATIENT)
Dept: GENERAL RADIOLOGY | Age: 69
DRG: 243 | End: 2019-04-01
Payer: MEDICAID

## 2019-04-01 DIAGNOSIS — E86.0 MILD DEHYDRATION: ICD-10-CM

## 2019-04-01 DIAGNOSIS — J44.9 ADVANCED COPD (HCC): ICD-10-CM

## 2019-04-01 DIAGNOSIS — N39.0 ACUTE UTI: ICD-10-CM

## 2019-04-01 DIAGNOSIS — Z87.19 HISTORY OF ESOPHAGEAL STRICTURE: ICD-10-CM

## 2019-04-01 DIAGNOSIS — Z99.81 SUPPLEMENTAL OXYGEN DEPENDENT: ICD-10-CM

## 2019-04-01 DIAGNOSIS — K21.00 GASTROESOPHAGEAL REFLUX DISEASE WITH ESOPHAGITIS: ICD-10-CM

## 2019-04-01 DIAGNOSIS — B37.49 CANDIDAL URINARY TRACT INFECTION: ICD-10-CM

## 2019-04-01 DIAGNOSIS — N28.1 RENAL CYST, RIGHT: ICD-10-CM

## 2019-04-01 DIAGNOSIS — T85.528S: ICD-10-CM

## 2019-04-01 DIAGNOSIS — R13.10 DYSPHAGIA, UNSPECIFIED TYPE: ICD-10-CM

## 2019-04-01 DIAGNOSIS — Z72.0 TOBACCO ABUSE: ICD-10-CM

## 2019-04-01 DIAGNOSIS — E44.0 MODERATE PROTEIN-CALORIE MALNUTRITION (HCC): Chronic | ICD-10-CM

## 2019-04-01 DIAGNOSIS — R10.84 GENERALIZED ABDOMINAL PAIN: Primary | ICD-10-CM

## 2019-04-01 LAB
A/G RATIO: 1.2 (ref 1.1–2.2)
ABO/RH: NORMAL
ALBUMIN SERPL-MCNC: 3.5 G/DL (ref 3.4–5)
ALP BLD-CCNC: 78 U/L (ref 40–129)
ALT SERPL-CCNC: 12 U/L (ref 10–40)
AMORPHOUS: ABNORMAL /HPF
ANION GAP SERPL CALCULATED.3IONS-SCNC: 7 MMOL/L (ref 3–16)
ANTIBODY SCREEN: NORMAL
APTT: 28.4 SEC (ref 26–36)
AST SERPL-CCNC: 16 U/L (ref 15–37)
BACTERIA: ABNORMAL /HPF
BASOPHILS ABSOLUTE: 0.1 K/UL (ref 0–0.2)
BASOPHILS RELATIVE PERCENT: 1.4 %
BILIRUB SERPL-MCNC: <0.2 MG/DL (ref 0–1)
BILIRUBIN URINE: NEGATIVE
BLOOD, URINE: NEGATIVE
BUN BLDV-MCNC: 9 MG/DL (ref 7–20)
CALCIUM SERPL-MCNC: 9.2 MG/DL (ref 8.3–10.6)
CHLORIDE BLD-SCNC: 99 MMOL/L (ref 99–110)
CLARITY: ABNORMAL
CO2: 34 MMOL/L (ref 21–32)
COLOR: YELLOW
CREAT SERPL-MCNC: <0.5 MG/DL (ref 0.8–1.3)
EKG ATRIAL RATE: 93 BPM
EKG DIAGNOSIS: NORMAL
EKG P AXIS: 79 DEGREES
EKG P-R INTERVAL: 146 MS
EKG Q-T INTERVAL: 346 MS
EKG QRS DURATION: 64 MS
EKG QTC CALCULATION (BAZETT): 430 MS
EKG R AXIS: 78 DEGREES
EKG T AXIS: 79 DEGREES
EKG VENTRICULAR RATE: 93 BPM
EOSINOPHILS ABSOLUTE: 0.2 K/UL (ref 0–0.6)
EOSINOPHILS RELATIVE PERCENT: 2.2 %
EPITHELIAL CELLS, UA: ABNORMAL /HPF
GFR AFRICAN AMERICAN: >60
GFR NON-AFRICAN AMERICAN: >60
GLOBULIN: 3 G/DL
GLUCOSE BLD-MCNC: 91 MG/DL (ref 70–99)
GLUCOSE URINE: NEGATIVE MG/DL
HCT VFR BLD CALC: 33.2 % (ref 40.5–52.5)
HEMOGLOBIN: 10.6 G/DL (ref 13.5–17.5)
INR BLD: 1.11 (ref 0.86–1.14)
KETONES, URINE: NEGATIVE MG/DL
LEUKOCYTE ESTERASE, URINE: ABNORMAL
LIPASE: 20 U/L (ref 13–60)
LYMPHOCYTES ABSOLUTE: 1.4 K/UL (ref 1–5.1)
LYMPHOCYTES RELATIVE PERCENT: 19 %
MCH RBC QN AUTO: 27.5 PG (ref 26–34)
MCHC RBC AUTO-ENTMCNC: 31.8 G/DL (ref 31–36)
MCV RBC AUTO: 86.7 FL (ref 80–100)
MICROSCOPIC EXAMINATION: YES
MONOCYTES ABSOLUTE: 0.8 K/UL (ref 0–1.3)
MONOCYTES RELATIVE PERCENT: 11.1 %
MUCUS: ABNORMAL /LPF
NEUTROPHILS ABSOLUTE: 4.8 K/UL (ref 1.7–7.7)
NEUTROPHILS RELATIVE PERCENT: 66.3 %
NITRITE, URINE: NEGATIVE
PDW BLD-RTO: 17.9 % (ref 12.4–15.4)
PH UA: 7.5 (ref 5–8)
PLATELET # BLD: 226 K/UL (ref 135–450)
PMV BLD AUTO: 10.1 FL (ref 5–10.5)
POTASSIUM REFLEX MAGNESIUM: 4.6 MMOL/L (ref 3.5–5.1)
PROTEIN UA: NEGATIVE MG/DL
PROTHROMBIN TIME: 12.7 SEC (ref 9.8–13)
RBC # BLD: 3.83 M/UL (ref 4.2–5.9)
RBC UA: ABNORMAL /HPF (ref 0–2)
SODIUM BLD-SCNC: 140 MMOL/L (ref 136–145)
SPECIFIC GRAVITY UA: 1.01 (ref 1–1.03)
TOTAL PROTEIN: 6.5 G/DL (ref 6.4–8.2)
URINE TYPE: ABNORMAL
UROBILINOGEN, URINE: 0.2 E.U./DL
WBC # BLD: 7.3 K/UL (ref 4–11)
WBC UA: ABNORMAL /HPF (ref 0–5)
YEAST: PRESENT /HPF

## 2019-04-01 PROCEDURE — 6360000004 HC RX CONTRAST MEDICATION: Performed by: EMERGENCY MEDICINE

## 2019-04-01 PROCEDURE — 83690 ASSAY OF LIPASE: CPT

## 2019-04-01 PROCEDURE — 85610 PROTHROMBIN TIME: CPT

## 2019-04-01 PROCEDURE — 02HV33Z INSERTION OF INFUSION DEVICE INTO SUPERIOR VENA CAVA, PERCUTANEOUS APPROACH: ICD-10-PCS | Performed by: EMERGENCY MEDICINE

## 2019-04-01 PROCEDURE — C1751 CATH, INF, PER/CENT/MIDLINE: HCPCS

## 2019-04-01 PROCEDURE — 6360000002 HC RX W HCPCS: Performed by: EMERGENCY MEDICINE

## 2019-04-01 PROCEDURE — 94150 VITAL CAPACITY TEST: CPT

## 2019-04-01 PROCEDURE — 86901 BLOOD TYPING SEROLOGIC RH(D): CPT

## 2019-04-01 PROCEDURE — 36573 INSJ PICC RS&I 5 YR+: CPT

## 2019-04-01 PROCEDURE — 6370000000 HC RX 637 (ALT 250 FOR IP): Performed by: PHYSICIAN ASSISTANT

## 2019-04-01 PROCEDURE — 86900 BLOOD TYPING SEROLOGIC ABO: CPT

## 2019-04-01 PROCEDURE — 81001 URINALYSIS AUTO W/SCOPE: CPT

## 2019-04-01 PROCEDURE — 6370000000 HC RX 637 (ALT 250 FOR IP): Performed by: INTERNAL MEDICINE

## 2019-04-01 PROCEDURE — 2580000003 HC RX 258: Performed by: EMERGENCY MEDICINE

## 2019-04-01 PROCEDURE — 94761 N-INVAS EAR/PLS OXIMETRY MLT: CPT

## 2019-04-01 PROCEDURE — 36415 COLL VENOUS BLD VENIPUNCTURE: CPT

## 2019-04-01 PROCEDURE — 93010 ELECTROCARDIOGRAM REPORT: CPT | Performed by: INTERNAL MEDICINE

## 2019-04-01 PROCEDURE — 99285 EMERGENCY DEPT VISIT HI MDM: CPT

## 2019-04-01 PROCEDURE — 96361 HYDRATE IV INFUSION ADD-ON: CPT

## 2019-04-01 PROCEDURE — 85730 THROMBOPLASTIN TIME PARTIAL: CPT

## 2019-04-01 PROCEDURE — 86850 RBC ANTIBODY SCREEN: CPT

## 2019-04-01 PROCEDURE — 93005 ELECTROCARDIOGRAM TRACING: CPT | Performed by: EMERGENCY MEDICINE

## 2019-04-01 PROCEDURE — 6370000000 HC RX 637 (ALT 250 FOR IP): Performed by: EMERGENCY MEDICINE

## 2019-04-01 PROCEDURE — 80053 COMPREHEN METABOLIC PANEL: CPT

## 2019-04-01 PROCEDURE — 71045 X-RAY EXAM CHEST 1 VIEW: CPT

## 2019-04-01 PROCEDURE — 74177 CT ABD & PELVIS W/CONTRAST: CPT

## 2019-04-01 PROCEDURE — 85025 COMPLETE CBC W/AUTO DIFF WBC: CPT

## 2019-04-01 PROCEDURE — 87086 URINE CULTURE/COLONY COUNT: CPT

## 2019-04-01 PROCEDURE — 2580000003 HC RX 258: Performed by: PHYSICIAN ASSISTANT

## 2019-04-01 PROCEDURE — 96375 TX/PRO/DX INJ NEW DRUG ADDON: CPT

## 2019-04-01 PROCEDURE — 1200000000 HC SEMI PRIVATE

## 2019-04-01 PROCEDURE — 96374 THER/PROPH/DIAG INJ IV PUSH: CPT

## 2019-04-01 PROCEDURE — 2700000000 HC OXYGEN THERAPY PER DAY

## 2019-04-01 RX ORDER — POTASSIUM CHLORIDE 20 MEQ/1
40 TABLET, EXTENDED RELEASE ORAL PRN
Status: DISCONTINUED | OUTPATIENT
Start: 2019-04-01 | End: 2019-04-04 | Stop reason: HOSPADM

## 2019-04-01 RX ORDER — MAGNESIUM SULFATE 1 G/100ML
1 INJECTION INTRAVENOUS PRN
Status: DISCONTINUED | OUTPATIENT
Start: 2019-04-01 | End: 2019-04-04 | Stop reason: HOSPADM

## 2019-04-01 RX ORDER — RIVASTIGMINE 4.6 MG/24H
1 PATCH, EXTENDED RELEASE TRANSDERMAL DAILY
Status: DISCONTINUED | OUTPATIENT
Start: 2019-04-02 | End: 2019-04-04 | Stop reason: HOSPADM

## 2019-04-01 RX ORDER — ASPIRIN 81 MG/1
81 TABLET, CHEWABLE ORAL DAILY
Status: DISCONTINUED | OUTPATIENT
Start: 2019-04-02 | End: 2019-04-04 | Stop reason: HOSPADM

## 2019-04-01 RX ORDER — SODIUM CHLORIDE 9 MG/ML
INJECTION, SOLUTION INTRAVENOUS CONTINUOUS
Status: DISCONTINUED | OUTPATIENT
Start: 2019-04-01 | End: 2019-04-03

## 2019-04-01 RX ORDER — TAMSULOSIN HYDROCHLORIDE 0.4 MG/1
0.4 CAPSULE ORAL DAILY
Status: DISCONTINUED | OUTPATIENT
Start: 2019-04-02 | End: 2019-04-04 | Stop reason: HOSPADM

## 2019-04-01 RX ORDER — MORPHINE SULFATE 4 MG/ML
4 INJECTION, SOLUTION INTRAMUSCULAR; INTRAVENOUS ONCE
Status: COMPLETED | OUTPATIENT
Start: 2019-04-01 | End: 2019-04-01

## 2019-04-01 RX ORDER — DILTIAZEM HYDROCHLORIDE 240 MG/1
240 CAPSULE, COATED, EXTENDED RELEASE ORAL DAILY
Status: DISCONTINUED | OUTPATIENT
Start: 2019-04-02 | End: 2019-04-02

## 2019-04-01 RX ORDER — ALBUTEROL SULFATE 90 UG/1
2 AEROSOL, METERED RESPIRATORY (INHALATION) EVERY 6 HOURS PRN
Status: DISCONTINUED | OUTPATIENT
Start: 2019-04-01 | End: 2019-04-01

## 2019-04-01 RX ORDER — SODIUM CHLORIDE 0.9 % (FLUSH) 0.9 %
10 SYRINGE (ML) INJECTION EVERY 12 HOURS SCHEDULED
Status: DISCONTINUED | OUTPATIENT
Start: 2019-04-01 | End: 2019-04-04 | Stop reason: HOSPADM

## 2019-04-01 RX ORDER — ALBUTEROL SULFATE 90 UG/1
2 AEROSOL, METERED RESPIRATORY (INHALATION) EVERY 6 HOURS PRN
Status: DISCONTINUED | OUTPATIENT
Start: 2019-04-01 | End: 2019-04-04 | Stop reason: HOSPADM

## 2019-04-01 RX ORDER — 0.9 % SODIUM CHLORIDE 0.9 %
1000 INTRAVENOUS SOLUTION INTRAVENOUS ONCE
Status: COMPLETED | OUTPATIENT
Start: 2019-04-01 | End: 2019-04-01

## 2019-04-01 RX ORDER — ONDANSETRON 2 MG/ML
4 INJECTION INTRAMUSCULAR; INTRAVENOUS EVERY 6 HOURS PRN
Status: DISCONTINUED | OUTPATIENT
Start: 2019-04-01 | End: 2019-04-04 | Stop reason: HOSPADM

## 2019-04-01 RX ORDER — CITALOPRAM 20 MG/1
10 TABLET ORAL DAILY
Status: DISCONTINUED | OUTPATIENT
Start: 2019-04-02 | End: 2019-04-04 | Stop reason: HOSPADM

## 2019-04-01 RX ORDER — SODIUM CHLORIDE 0.9 % (FLUSH) 0.9 %
10 SYRINGE (ML) INJECTION PRN
Status: DISCONTINUED | OUTPATIENT
Start: 2019-04-01 | End: 2019-04-04 | Stop reason: HOSPADM

## 2019-04-01 RX ORDER — ATORVASTATIN CALCIUM 10 MG/1
10 TABLET, FILM COATED ORAL NIGHTLY
Status: DISCONTINUED | OUTPATIENT
Start: 2019-04-01 | End: 2019-04-04 | Stop reason: HOSPADM

## 2019-04-01 RX ORDER — SUCRALFATE 1 G/1
1 TABLET ORAL EVERY 6 HOURS SCHEDULED
Status: DISCONTINUED | OUTPATIENT
Start: 2019-04-01 | End: 2019-04-04 | Stop reason: HOSPADM

## 2019-04-01 RX ORDER — FLUCONAZOLE 100 MG/1
200 TABLET ORAL ONCE
Status: COMPLETED | OUTPATIENT
Start: 2019-04-01 | End: 2019-04-01

## 2019-04-01 RX ORDER — DOCUSATE SODIUM 100 MG/1
100 CAPSULE, LIQUID FILLED ORAL 2 TIMES DAILY
Status: DISCONTINUED | OUTPATIENT
Start: 2019-04-01 | End: 2019-04-04 | Stop reason: HOSPADM

## 2019-04-01 RX ORDER — POTASSIUM CHLORIDE 7.45 MG/ML
10 INJECTION INTRAVENOUS PRN
Status: DISCONTINUED | OUTPATIENT
Start: 2019-04-01 | End: 2019-04-04 | Stop reason: HOSPADM

## 2019-04-01 RX ORDER — GABAPENTIN 400 MG/1
400 CAPSULE ORAL 3 TIMES DAILY
Status: DISCONTINUED | OUTPATIENT
Start: 2019-04-01 | End: 2019-04-04 | Stop reason: HOSPADM

## 2019-04-01 RX ORDER — ONDANSETRON 2 MG/ML
4 INJECTION INTRAMUSCULAR; INTRAVENOUS ONCE
Status: COMPLETED | OUTPATIENT
Start: 2019-04-01 | End: 2019-04-01

## 2019-04-01 RX ORDER — PANTOPRAZOLE SODIUM 40 MG/1
40 GRANULE, DELAYED RELEASE ORAL
Status: DISCONTINUED | OUTPATIENT
Start: 2019-04-02 | End: 2019-04-04 | Stop reason: HOSPADM

## 2019-04-01 RX ADMIN — MORPHINE SULFATE 4 MG: 4 INJECTION INTRAVENOUS at 15:48

## 2019-04-01 RX ADMIN — DOCUSATE SODIUM 100 MG: 100 CAPSULE, LIQUID FILLED ORAL at 22:04

## 2019-04-01 RX ADMIN — CEFTRIAXONE SODIUM 1 G: 1 INJECTION, POWDER, FOR SOLUTION INTRAMUSCULAR; INTRAVENOUS at 17:49

## 2019-04-01 RX ADMIN — SODIUM CHLORIDE 1000 ML: 9 INJECTION, SOLUTION INTRAVENOUS at 15:48

## 2019-04-01 RX ADMIN — ATORVASTATIN CALCIUM 10 MG: 10 TABLET, FILM COATED ORAL at 22:04

## 2019-04-01 RX ADMIN — Medication 10 ML: at 22:03

## 2019-04-01 RX ADMIN — ONDANSETRON 4 MG: 2 INJECTION, SOLUTION INTRAMUSCULAR; INTRAVENOUS at 15:48

## 2019-04-01 RX ADMIN — IOPAMIDOL 75 ML: 755 INJECTION, SOLUTION INTRAVENOUS at 15:57

## 2019-04-01 RX ADMIN — GABAPENTIN 400 MG: 400 CAPSULE ORAL at 22:03

## 2019-04-01 RX ADMIN — FLUCONAZOLE 200 MG: 100 TABLET ORAL at 17:49

## 2019-04-01 ASSESSMENT — PAIN SCALES - GENERAL
PAINLEVEL_OUTOF10: 0
PAINLEVEL_OUTOF10: 7
PAINLEVEL_OUTOF10: 7

## 2019-04-01 NOTE — ED NOTES
1552- Perfect serve sent to Dr. Celeste Young for admission.         2142- Call was returned by NIK stewart and spoke to Dr. Curt Bowden   04/01/19 [address] [place] [address]  04/01/19 9207

## 2019-04-01 NOTE — ED NOTES
Dr. Fabian Harvey at bedside evaluating patient at this time. Will continue to monitor.       Mac Brody RN  04/01/19 1353

## 2019-04-01 NOTE — PROGRESS NOTES
Bedside report given and pt care transferred to [facility]. Refused 4 Eyes until dinner is done. Pt denies needs at this time. Call light within reach.

## 2019-04-01 NOTE — PROGRESS NOTES
RESPIRATORY THERAPY ASSESSMENT    Name:  Felix Guevara  Medical Record Number:  [id number]  Age: 80 y.o. Gender: male  : 1950  Today's Date:  2019  Room:  0228/0228-01    Assessment     Is the patient being admitted for a COPD or Asthma exacerbation? No   (If yes the patient will be seen every 4 hours for the first 24 hours and then reassessed)    Patient Admission Diagnosis      Allergies  Allergies   Allergen Reactions    Heparin      Pt was HIT positive in        Minimum Predicted Vital Capacity:     925          Actual Vital Capacity:      1000              Pulmonary History:COPD and emhysema  Home Oxygen Therapy:  1.5-2.5 liters/min via nasal cannula  Home Respiratory Therapy:Albuterol, Ipratropium Bromide and Umeclidinium Bromide/Vilanterol   Current Respiratory Therapy:  Albuterol PRN          Respiratory Severity Index(RSI)   Patients with orders for inhalation medications, oxygen, or any therapeutic treatment modality will be placed on Respiratory Protocol. They will be assessed with the first treatment and at least every 72 hours thereafter. The following severity scale will be used to determine frequency of treatment intervention.     Smoking History: Pulmonary Disease or Smoking History, Greater than 15 pack year = 2    Social History  Social History     Tobacco Use    Smoking status: Current Every Day Smoker     Packs/day: 1.00     Years: 50.00     Pack years: 50.00     Types: Cigarettes    Smokeless tobacco: Never Used   Substance Use Topics    Alcohol use: No    Drug use: No       Recent Surgical History: None = 0  Past Surgical History  Past Surgical History:   Procedure Laterality Date    UPPER GASTROINTESTINAL ENDOSCOPY N/A 2018    EGD DILATION BALLOON performed by Susan Reyes MD at [place] [address] N/A 2018    EGD DILATION BALLOON performed by Susan Reyes MD at [address] [place] [address] UPPER GASTROINTESTINAL ENDOSCOPY  12/23/2018    foreign body removal, balloon dilation    UPPER GASTROINTESTINAL ENDOSCOPY N/A 12/23/2018    EGD FOREIGN BODY REMOVAL performed by Ashkan Reyes MD at [place] [address]  12/23/2018    EGD DILATION BALLOON performed by Ashkan Reyes MD at [place] [address] N/A 1/30/2019    EGD W/20X8 STENT PLACEMENT W/ANES.  (9:30) performed by Askhan Reyes MD at [place] [address]  1/30/2019    EGD DILATION BALLOON performed by Ashkan Reyes MD at [place] [address]  1/30/2019    EGD BIOPSY performed by Ashkan Reyes MD at [place] [address]  03/01/2019    Esophageal stent removed    UPPER GASTROINTESTINAL ENDOSCOPY N/A 3/1/2019    EGD DILATION BALLOON performed by Ashkan Reyes MD at [place] [address]  3/1/2019    EGD STENT REMOVAL performed by Ashkan Reyes MD at [place] [address] N/A 3/4/2019    EGD BIOPSY performed by Robert Merrill DO at [place] [address]  3/4/2019    EGD SUBMUCOSAL/BOTOX INJECTION performed by Robert Merrill DO at [place] [address]  3/4/2019    EGD DILATION BALLOON performed by Robert Merrill DO at [address] [facility]       Level of Consciousness: Alert, oriented, and cooperative=0    Level of Activity: walking assissted=1    Respiratory Pattern: Regular Pattern; RR 8-20 = 0    Breath Sounds: Diminshed bilaterally and/or crackles = 2    Sputum   ,  ,    Cough: Strong, spontaneous, non-productive = 0    Vital Signs   /69   Pulse 88   Temp 97.6 °F (36.4 °C) (Oral)   Resp 18   Ht 5' 5\" (1.651 m)   Wt 120 lb (54.4 kg)   SpO2 100%   BMI 19.97 kg/m²   SPO2 (COPD values may differ): 90-91% on room air or greater than 92% on FiO2 24- 28% = 1    Peak Flow (asthma only): not applicable = 0    NUD:7-3 Q4 PRN FOR DYSPNEA        Plan       Goals: medication delivery and improve oxygenation    Patient/caregiver was educated on the proper method of use for Respiratory Care Devices:  Yes      Level of patient/caregiver understanding able to:   ? Verbalize understanding   ? Demonstrate understanding       ? Teach back        ? Needs reinforcement       ? No available caregiver               ? Other:     Response to education:  Very Good     Is patient being placed on Home Treatment Regimen? YES    Does the patient have everything they need prior to discharge? NA     Comments: Patient chart reviewed, patient assessed. Plan of Care: continue patient on home use     Electronically signed by Donnie Land RCP on 4/1/2019 at 7:09 PM    Respiratory Protocol Guidelines     1. Assessment and treatment by Respiratory Therapy will be initiated for medication and therapeutic interventions upon initiation of aerosolized medication. 2. Physician will be contacted for respiratory rate (RR) greater than 35 breaths per minute. Therapy will be held for heart rate (HR) greater than 140 beats per minute, pending direction from physician. 3. Bronchodilators will be administered via Metered Dose Inhaler (MDI) with spacer when the following criteria are met:  a. Alert and cooperative     b. HR < 140 bpm  c. RR < 30 bpm                d. Can demonstrate a 2-3 second inspiratory hold  4. Bronchodilators will be administered via Hand Held Nebulizer NESTOR [facility]) to patients when ANY of the following criteria are met  a. Incognizant or uncooperative          b. Patients treated with HHN at Home        c. Unable to demonstrate proper use of MDI with spacer     d. RR > 30 bpm   5.  Bronchodilators will be delivered via Metered Dose Inhaler (MDI), HHN, Aerogen to

## 2019-04-01 NOTE — PROGRESS NOTES
Admission questions completed, eating dinner in bed, A&O x 4. Bed in lowest position, call light within reach.

## 2019-04-01 NOTE — ED PROVIDER NOTES
Lillian Weaver MD at [facility] ENDOSCOPY  12/23/2018    EGD DILATION BALLOON performed by Lillian Weaver MD at  [facility] N/A 1/30/2019    EGD W/20X8 STENT PLACEMENT W/ANES.  (9:30) performed by Lillian Weaver MD at [address] [place] [address]  1/30/2019    EGD DILATION BALLOON performed by Lillian Weaver MD at [address] [place] [address]  1/30/2019    EGD BIOPSY performed by Lillian Weaver MD at [facility] ENDOSCOPY  03/01/2019    Esophageal stent removed    UPPER GASTROINTESTINAL ENDOSCOPY N/A 3/1/2019    EGD DILATION BALLOON performed by Lillian Weaver MD at  [facility]  3/1/2019    EGD STENT REMOVAL performed by Lillian Weaver MD at [address] [place] [address] N/A 3/4/2019    EGD BIOPSY performed by Sarbjit Mariano DO at [address] [place] [address]  3/4/2019    EGD SUBMUCOSAL/BOTOX INJECTION performed by Sarbjit Mariano DO at  [facility]  3/4/2019    EGD DILATION BALLOON performed by Sarbjit Mariano DO at [facility] ENDOSCOPY     Family History   Problem Relation Age of Onset    Heart Disease Mother     Diabetes Mother     Cancer Father      Social History     Socioeconomic History    Marital status: Single     Spouse name: Not on file    Number of children: Not on file    Years of education: Not on file    Highest education level: Not on file   Occupational History    Not on file   Social Needs    Financial resource strain: Not on file    Food insecurity:     Worry: Not on file     Inability: Not on file    Transportation needs:     Medical: Not on file     Non-medical: Not on file   Tobacco Use  Smoking status: Current Every Day Smoker     Packs/day: 1.00     Years: 50.00     Pack years: 50.00     Types: Cigarettes    Smokeless tobacco: Never Used   Substance and Sexual Activity    Alcohol use: No    Drug use: No    Sexual activity: Not Currently   Lifestyle    Physical activity:     Days per week: Not on file     Minutes per session: Not on file    Stress: Not on file   Relationships    Social connections:     Talks on phone: Not on file     Gets together: Not on file     Attends Gnosticism service: Not on file     Active member of club or organization: Not on file     Attends meetings of clubs or organizations: Not on file     Relationship status: Not on file    Intimate partner violence:     Fear of current or ex partner: Not on file     Emotionally abused: Not on file     Physically abused: Not on file     Forced sexual activity: Not on file   Other Topics Concern    Not on file   Social History Narrative    Not on file     Current Facility-Administered Medications   Medication Dose Route Frequency Provider Last Rate Last Dose    0.9 % sodium chloride bolus  1,000 mL Intravenous Once Yamel Pock,  mL/hr at 04/01/19 1548 1,000 mL at 04/01/19 1548    fluconazole (DIFLUCAN) tablet 200 mg  200 mg Oral Once Yamel Pock, DO        cefTRIAXone (ROCEPHIN) 1 g IVPB in 50 mL D5W minibag  1 g Intravenous Once Yamel Pock, DO         Current Outpatient Medications   Medication Sig Dispense Refill    magnesium oxide (MAG-OX) 400 (241.3 Mg) MG TABS tablet Take 1 tablet by mouth daily 30 tablet 1    diltiazem (DILACOR XR) 240 MG extended release capsule Take 240 mg by mouth daily      pantoprazole sodium (PROTONIX) 40 MG PACK packet Take 1 packet by mouth 2 times daily (before meals) 60 each 1    aspirin 81 MG tablet Take 81 mg by mouth daily      atorvastatin (LIPITOR) 10 MG tablet Take 10 mg by mouth nightly      citalopram (CELEXA) 10 MG tablet Take 10 mg by mouth daily      docusate (COLACE) 50 MG/5ML liquid Take 100 mg by mouth 2 times daily      gabapentin (NEURONTIN) 400 MG capsule Take 400 mg by mouth 3 times daily. .      ipratropium (ATROVENT) 0.02 % nebulizer solution Take 1.25 mg by nebulization every 4 hours as needed for Wheezing      nitroGLYCERIN (NITROSTAT) 0.4 MG SL tablet Place 0.4 mg under the tongue every 5 minutes as needed for Chest pain up to max of 3 total doses. If no relief after 1 dose, call 911.  albuterol sulfate  (90 Base) MCG/ACT inhaler Inhale 2 puffs into the lungs every 6 hours as needed for Wheezing      rivastigmine (EXELON) 4.6 MG/24HR Place 1 patch onto the skin daily      umeclidinium-vilanterol (ANORO ELLIPTA) 62.5-25 MCG/INH AEPB inhaler Inhale 1 puff into the lungs daily      sucralfate (CARAFATE) 1 GM/10ML suspension Take 1 g by mouth 4 times daily      tamsulosin (FLOMAX) 0.4 MG capsule Take 0.4 mg by mouth daily       Allergies   Allergen Reactions    Heparin      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, 2018       REVIEW OF SYSTEMS  10 systems reviewed, pertinent positives per HPI otherwise noted to be negative. PHYSICAL EXAM  BP (!) 141/95   Pulse 85   Temp 97 °F (36.1 °C) (Oral)   Resp 14   Ht 5' 5\" (1.651 m)   Wt 120 lb (54.4 kg)   SpO2 100%   BMI 19.97 kg/m²   GENERAL APPEARANCE: Awake and alert. Cooperative. No acute distress, but appears uncomfortable, disheveled, unkempt appearance  HEAD: Normocephalic. Atraumatic. EYES: PERRL. EOM's grossly intact. ENT: Mucous membranes are dry/tacky, airway patent  NECK: Supple. No JVD  HEART: RRR. No murmurs  LUNGS: Respirations nonlabored, but slightly tachypneic, Lungs are with coarse breath sounds, diminished throughout, no significant wheezes crackles or rhonchi  ABDOMEN: Soft. Non-distended. Significant generalized tenderness throughout,. Voluntary guarding. Normal bowel sounds. EXTREMITIES: No peripheral edema. Moves all extremities equally.  All extremities neurovascularly intact. SKIN: Warm and dry. Multiple dry patchy areas of skin  NEUROLOGICAL: Alert and oriented. No gross facial drooping. Strength 5/5, sensation intact. No truncal ataxia. Slightly slurred and difficult to understand due to dry mouth and mumbling  PSYCHIATRIC: Normal mood and affect. LABS  I have reviewed all labs for this visit.    Results for orders placed or performed during the hospital encounter of 04/01/19   CBC Auto Differential   Result Value Ref Range    WBC 7.3 4.0 - 11.0 K/uL    RBC 3.83 (L) 4.20 - 5.90 M/uL    Hemoglobin 10.6 (L) 13.5 - 17.5 g/dL    Hematocrit 33.2 (L) 40.5 - 52.5 %    MCV 86.7 80.0 - 100.0 fL    MCH 27.5 26.0 - 34.0 pg    MCHC 31.8 31.0 - 36.0 g/dL    RDW 17.9 (H) 12.4 - 15.4 %    Platelets 319 941 - 022 K/uL    MPV 10.1 5.0 - 10.5 fL    Neutrophils % 66.3 %    Lymphocytes % 19.0 %    Monocytes % 11.1 %    Eosinophils % 2.2 %    Basophils % 1.4 %    Neutrophils # 4.8 1.7 - 7.7 K/uL    Lymphocytes # 1.4 1.0 - 5.1 K/uL    Monocytes # 0.8 0.0 - 1.3 K/uL    Eosinophils # 0.2 0.0 - 0.6 K/uL    Basophils # 0.1 0.0 - 0.2 K/uL   Comprehensive Metabolic Panel w/ Reflex to MG   Result Value Ref Range    Sodium 140 136 - 145 mmol/L    Potassium reflex Magnesium 4.6 3.5 - 5.1 mmol/L    Chloride 99 99 - 110 mmol/L    CO2 34 (H) 21 - 32 mmol/L    Anion Gap 7 3 - 16    Glucose 91 70 - 99 mg/dL    BUN 9 7 - 20 mg/dL    CREATININE <0.5 (L) 0.8 - 1.3 mg/dL    GFR Non-African American >60 >60    GFR African American >60 >60    Calcium 9.2 8.3 - 10.6 mg/dL    Total Protein 6.5 6.4 - 8.2 g/dL    Alb 3.5 3.4 - 5.0 g/dL    Albumin/Globulin Ratio 1.2 1.1 - 2.2    Total Bilirubin <0.2 0.0 - 1.0 mg/dL    Alkaline Phosphatase 78 40 - 129 U/L    ALT 12 10 - 40 U/L    AST 16 15 - 37 U/L    Globulin 3.0 g/dL   Lipase   Result Value Ref Range    Lipase 20.0 13.0 - 60.0 U/L   Protime-INR   Result Value Ref Range    Protime 12.7 9.8 - 13.0 sec    INR 1.11 0.86 - 1.14   APTT   Result Value Ref Range    aPTT 28.4 26.0 - 36.0 sec   Urinalysis, reflex to microscopic   Result Value Ref Range    Color, UA Yellow Straw/Yellow    Clarity, UA SL CLOUDY (A) Clear    Glucose, Ur Negative Negative mg/dL    Bilirubin Urine Negative Negative    Ketones, Urine Negative Negative mg/dL    Specific Gravity, UA 1.010 1.005 - 1.030    Blood, Urine Negative Negative    pH, UA 7.5 5.0 - 8.0    Protein, UA Negative Negative mg/dL    Urobilinogen, Urine 0.2 <2.0 E.U./dL    Nitrite, Urine Negative Negative    Leukocyte Esterase, Urine LARGE (A) Negative    Microscopic Examination YES     Urine Type Not Specified    Microscopic Urinalysis   Result Value Ref Range    Mucus, UA 1+ (A) /LPF    WBC, UA 20-50 (A) 0 - 5 /HPF    RBC, UA 0-2 0 - 2 /HPF    Epi Cells 0-2 /HPF    Bacteria, UA 1+ (A) /HPF    Amorphous, UA 1+ (A) /HPF    Yeast, UA Present (A) /HPF   TYPE AND SCREEN   Result Value Ref Range    ABO/Rh O POS     Antibody Screen NEG        RADIOLOGY  Fl Esophagram    Result Date: 3/6/2019  EXAMINATION: SINGLE CONTRAST ESOPHAGRAM 3/4/2019 9:29 am TECHNIQUE: Single contrast esophagram was performed with water-soluble contrast. FLUOROSCOPY DOSE AND TYPE OR TIME AND EXPOSURES: 32 seconds, 55 images. COMPARISON: 12/13/2018 HISTORY: ORDERING SYSTEM PROVIDED HISTORY: dysphagia, esophageal stricture TECHNOLOGIST PROVIDED HISTORY: Reason for exam:->dysphagia, esophageal stricture Ordering Physician Provided Reason for Exam: dysphagia, esophageal stricture Acuity: Acute Type of Exam: Initial Additional signs and symptoms: fluoro time 32 seconds Esophageal stricture, recent balloon dilatation and stent removal. FINDINGS: Examination is limited as the patient is unable to stand. Additionally, the patient was unable to cooperate with positioning and he would not drink much of the contrast medium. Water-soluble contrast was used as the patient recently underwent balloon dilation. Contrast passed through the esophagus into the stomach.   A hiatal hernia was noted. There is residual stenosis in the distal esophagus, likely greater than 50%. Assessment is somewhat limited as the distal esophagus was never completely distended. For size no obvious evidence of leak was identified. Significantly limited examination. Residual stenosis in the distal esophagus, likely greater than 50%. No obvious evidence of leak. Hiatal hernia. Xr Chest Portable    Result Date: 4/1/2019  EXAMINATION: SINGLE XRAY VIEW OF THE CHEST 4/1/2019 2:36 pm COMPARISON: 03/02/2019 HISTORY: ORDERING SYSTEM PROVIDED HISTORY: SOB TECHNOLOGIST PROVIDED HISTORY: Reason for exam:->SOB Ordering Physician Provided Reason for Exam: sob, copd, current smoker Acuity: Unknown Type of Exam: Unknown FINDINGS: There is bibasilar scarring. The lungs are hyperexpanded. The cardiac silhouette is within normal limits. There is no pneumothorax or pleural effusion. 1.  No acute abnormality. Fl Modified Barium Swallow W Video    Result Date: 3/5/2019  EXAMINATION: MODIFIED BARIUM SWALLOW WAS PERFORMED IN CONJUNCTION WITH SPEECH PATHOLOGY SERVICES TECHNIQUE: Fluoroscopic evaluation of the swallowing mechanism was performed with multiple consistency of barium product. FLUOROSCOPY DOSE AND TYPE OR TIME AND EXPOSURES: 1 minute 24 seconds, no spot images, multiple recorded video loops COMPARISON: None HISTORY: ORDERING SYSTEM PROVIDED HISTORY: dysphagia TECHNOLOGIST PROVIDED HISTORY: Reason for exam:->dysphagia Ordering Physician Provided Reason for Exam: dyspagia Acuity: Acute Type of Exam: Initial Additional signs and symptoms: FT 1 min 24 seconds FINDINGS: Oral phase of swallowing was grossly within normal limits. Transient laryngeal penetration was observed with nectar thick and thin barium. No subglottic tracheal aspiration. Symptoms were alleviated with a chin-tuck maneuver. No evidence of penetration with semi solid and solid substances.      Transient laryngeal penetration with thin and nectar thick barium, alleviated by chin tuck. Please see separate speech pathology report for full discussion of findings and recommendations. CT abdomen and pelvis:  Impression     No evidence for acute intra-or intrapelvic pathology. Status post aortoiliac bypass grafting. Large right renal cyst measuring 9 x 8 cm. Multiple additional too small to characterize hypodense lesions within the  kidneys bilaterally, likely hemorrhagic cyst.  Attention on follow-up  suggested. Multiple intravesicular bladder calculi. ED COURSE/MDM  Patient seen and evaluated. Old records reviewed. Labs and imaging reviewed and results discussed with patient. Plan of care discussed with patient and family. Patient and family in agreement with plan.   Discussed with wife and sister, obtained their phone numbers for update since they were leaving the hospital .  Orders Placed This Encounter   Procedures    Urine culture    XR CHEST PORTABLE    CT ABDOMEN PELVIS W IV CONTRAST Additional Contrast? None    IR PICC WO SQ PORT/PUMP > 5 YEARS    CBC Auto Differential    Comprehensive Metabolic Panel w/ Reflex to MG    Lipase    Protime-INR    APTT    Urinalysis, reflex to microscopic    Microscopic Urinalysis    Inpatient consult to GI    Inpatient consult to Hospitalist    EKG 12 Lead    TYPE AND SCREEN    PATIENT STATUS (FROM ED OR OR/PROCEDURAL) Inpatient     Orders Placed This Encounter   Medications    0.9 % sodium chloride bolus    morphine sulfate (PF) injection 4 mg    ondansetron (ZOFRAN) injection 4 mg    DISCONTD: iohexol (OMNIPAQUE 240) 240 MG/ML injection     DOMINIK WALTER: cabinet override    iopamidol (ISOVUE-370) 76 % injection 75 mL    fluconazole (DIFLUCAN) tablet 200 mg    cefTRIAXone (ROCEPHIN) 1 g IVPB in 50 mL D5W minibag     ED Course as of Apr 01 1716   Mon Apr 01, 2019   1515 Spoke to Dr. Nneka Gutierrez with GI (on call for Dr. Yenni Sánchez) he was well aware of this

## 2019-04-01 NOTE — PLAN OF CARE
H/o esophageal stricture and dysphagia. Admitted early March, s/p esophageal dilatation.   Came to ER today requesting admit for consideration of PEG tube placement     Med surg    Carlitos Cifuentes PA-C  4/1/2019 5:10 PM

## 2019-04-01 NOTE — ED NOTES
1443- Call placed to GI for consult. 1512- Call was returned by Dr. Kenia Hines and spoke to Dr. Viraj Og.       Bere Newton  04/01/19 Linn Roper  04/01/19 [id number]

## 2019-04-01 NOTE — ED NOTES
Pt left floor in stable condition via stretcher for PICC placement.  Left message for wife and spoke with pt's sister per pt request.      Zuleima Mensah RN  04/01/19 [id number]

## 2019-04-01 NOTE — ED NOTES
Contact information for pt's family. Ivory Osorio (wife) [phone]. Silva Amy (sister) [phone].       Radha Quick RN  04/01/19 [id number]

## 2019-04-02 ENCOUNTER — ANESTHESIA (OUTPATIENT)
Dept: ENDOSCOPY | Age: 69
DRG: 243 | End: 2019-04-02
Payer: MEDICAID

## 2019-04-02 ENCOUNTER — ANESTHESIA EVENT (OUTPATIENT)
Dept: ENDOSCOPY | Age: 69
DRG: 243 | End: 2019-04-02
Payer: MEDICAID

## 2019-04-02 VITALS — DIASTOLIC BLOOD PRESSURE: 59 MMHG | OXYGEN SATURATION: 94 % | SYSTOLIC BLOOD PRESSURE: 95 MMHG

## 2019-04-02 LAB
ANION GAP SERPL CALCULATED.3IONS-SCNC: 5 MMOL/L (ref 3–16)
BASOPHILS ABSOLUTE: 0 K/UL (ref 0–0.2)
BASOPHILS RELATIVE PERCENT: 0.6 %
BUN BLDV-MCNC: 8 MG/DL (ref 7–20)
CALCIUM SERPL-MCNC: 9 MG/DL (ref 8.3–10.6)
CHLORIDE BLD-SCNC: 101 MMOL/L (ref 99–110)
CO2: 33 MMOL/L (ref 21–32)
CREAT SERPL-MCNC: <0.5 MG/DL (ref 0.8–1.3)
EOSINOPHILS ABSOLUTE: 0.2 K/UL (ref 0–0.6)
EOSINOPHILS RELATIVE PERCENT: 3.2 %
GFR AFRICAN AMERICAN: >60
GFR NON-AFRICAN AMERICAN: >60
GLUCOSE BLD-MCNC: 95 MG/DL (ref 70–99)
HCT VFR BLD CALC: 30.5 % (ref 40.5–52.5)
HEMOGLOBIN: 9.7 G/DL (ref 13.5–17.5)
LYMPHOCYTES ABSOLUTE: 1.9 K/UL (ref 1–5.1)
LYMPHOCYTES RELATIVE PERCENT: 30.7 %
MCH RBC QN AUTO: 28 PG (ref 26–34)
MCHC RBC AUTO-ENTMCNC: 31.8 G/DL (ref 31–36)
MCV RBC AUTO: 88.1 FL (ref 80–100)
MONOCYTES ABSOLUTE: 0.8 K/UL (ref 0–1.3)
MONOCYTES RELATIVE PERCENT: 12.8 %
NEUTROPHILS ABSOLUTE: 3.2 K/UL (ref 1.7–7.7)
NEUTROPHILS RELATIVE PERCENT: 52.7 %
PDW BLD-RTO: 18.2 % (ref 12.4–15.4)
PLATELET # BLD: 182 K/UL (ref 135–450)
PMV BLD AUTO: 9.3 FL (ref 5–10.5)
POTASSIUM REFLEX MAGNESIUM: 4.5 MMOL/L (ref 3.5–5.1)
RBC # BLD: 3.46 M/UL (ref 4.2–5.9)
SODIUM BLD-SCNC: 139 MMOL/L (ref 136–145)
WBC # BLD: 6 K/UL (ref 4–11)

## 2019-04-02 PROCEDURE — 6370000000 HC RX 637 (ALT 250 FOR IP): Performed by: PHYSICIAN ASSISTANT

## 2019-04-02 PROCEDURE — 6360000002 HC RX W HCPCS: Performed by: INTERNAL MEDICINE

## 2019-04-02 PROCEDURE — 6360000002 HC RX W HCPCS: Performed by: NURSE ANESTHETIST, CERTIFIED REGISTERED

## 2019-04-02 PROCEDURE — 0DH63UZ INSERTION OF FEEDING DEVICE INTO STOMACH, PERCUTANEOUS APPROACH: ICD-10-PCS | Performed by: INTERNAL MEDICINE

## 2019-04-02 PROCEDURE — 2580000003 HC RX 258: Performed by: PHYSICIAN ASSISTANT

## 2019-04-02 PROCEDURE — 2580000003 HC RX 258: Performed by: NURSE ANESTHETIST, CERTIFIED REGISTERED

## 2019-04-02 PROCEDURE — 7100000011 HC PHASE II RECOVERY - ADDTL 15 MIN: Performed by: INTERNAL MEDICINE

## 2019-04-02 PROCEDURE — 94761 N-INVAS EAR/PLS OXIMETRY MLT: CPT

## 2019-04-02 PROCEDURE — 7100000010 HC PHASE II RECOVERY - FIRST 15 MIN: Performed by: INTERNAL MEDICINE

## 2019-04-02 PROCEDURE — 2709999900 HC NON-CHARGEABLE SUPPLY: Performed by: INTERNAL MEDICINE

## 2019-04-02 PROCEDURE — 3700000000 HC ANESTHESIA ATTENDED CARE: Performed by: INTERNAL MEDICINE

## 2019-04-02 PROCEDURE — 85025 COMPLETE CBC W/AUTO DIFF WBC: CPT

## 2019-04-02 PROCEDURE — 6360000002 HC RX W HCPCS: Performed by: PHYSICIAN ASSISTANT

## 2019-04-02 PROCEDURE — 0D738ZZ DILATION OF LOWER ESOPHAGUS, VIA NATURAL OR ARTIFICIAL OPENING ENDOSCOPIC: ICD-10-PCS | Performed by: INTERNAL MEDICINE

## 2019-04-02 PROCEDURE — 99223 1ST HOSP IP/OBS HIGH 75: CPT | Performed by: INTERNAL MEDICINE

## 2019-04-02 PROCEDURE — 3609018000 HC EGD ESOPHAGOGASTRODUODENOSCOPY PEG TUBE INSERTION: Performed by: INTERNAL MEDICINE

## 2019-04-02 PROCEDURE — 6370000000 HC RX 637 (ALT 250 FOR IP): Performed by: INTERNAL MEDICINE

## 2019-04-02 PROCEDURE — 2580000003 HC RX 258: Performed by: INTERNAL MEDICINE

## 2019-04-02 PROCEDURE — 3700000001 HC ADD 15 MINUTES (ANESTHESIA): Performed by: INTERNAL MEDICINE

## 2019-04-02 PROCEDURE — 1200000000 HC SEMI PRIVATE

## 2019-04-02 PROCEDURE — 80048 BASIC METABOLIC PNL TOTAL CA: CPT

## 2019-04-02 PROCEDURE — 2700000000 HC OXYGEN THERAPY PER DAY

## 2019-04-02 RX ORDER — OXYCODONE HYDROCHLORIDE 5 MG/1
5 TABLET ORAL EVERY 6 HOURS PRN
Status: DISCONTINUED | OUTPATIENT
Start: 2019-04-02 | End: 2019-04-04 | Stop reason: HOSPADM

## 2019-04-02 RX ORDER — PROPOFOL 10 MG/ML
INJECTION, EMULSION INTRAVENOUS PRN
Status: DISCONTINUED | OUTPATIENT
Start: 2019-04-02 | End: 2019-04-02 | Stop reason: SDUPTHER

## 2019-04-02 RX ORDER — HYDROMORPHONE HCL 110MG/55ML
1 PATIENT CONTROLLED ANALGESIA SYRINGE INTRAVENOUS EVERY 4 HOURS PRN
Status: COMPLETED | OUTPATIENT
Start: 2019-04-02 | End: 2019-04-02

## 2019-04-02 RX ORDER — SODIUM CHLORIDE, SODIUM LACTATE, POTASSIUM CHLORIDE, CALCIUM CHLORIDE 600; 310; 30; 20 MG/100ML; MG/100ML; MG/100ML; MG/100ML
INJECTION, SOLUTION INTRAVENOUS CONTINUOUS PRN
Status: DISCONTINUED | OUTPATIENT
Start: 2019-04-02 | End: 2019-04-02 | Stop reason: SDUPTHER

## 2019-04-02 RX ORDER — HYDROMORPHONE HCL 110MG/55ML
1 PATIENT CONTROLLED ANALGESIA SYRINGE INTRAVENOUS EVERY 4 HOURS PRN
Status: DISCONTINUED | OUTPATIENT
Start: 2019-04-02 | End: 2019-04-02

## 2019-04-02 RX ORDER — HYDROMORPHONE HCL 110MG/55ML
0.5 PATIENT CONTROLLED ANALGESIA SYRINGE INTRAVENOUS EVERY 4 HOURS PRN
Status: DISCONTINUED | OUTPATIENT
Start: 2019-04-02 | End: 2019-04-02

## 2019-04-02 RX ORDER — DILTIAZEM HYDROCHLORIDE 60 MG/1
60 TABLET, FILM COATED ORAL EVERY 6 HOURS SCHEDULED
Status: DISCONTINUED | OUTPATIENT
Start: 2019-04-03 | End: 2019-04-04 | Stop reason: HOSPADM

## 2019-04-02 RX ADMIN — PHENYLEPHRINE HYDROCHLORIDE 100 MCG: 10 INJECTION INTRAVENOUS at 13:23

## 2019-04-02 RX ADMIN — DEXTROSE MONOHYDRATE 1 G: 5 INJECTION INTRAVENOUS at 13:02

## 2019-04-02 RX ADMIN — GABAPENTIN 400 MG: 400 CAPSULE ORAL at 09:25

## 2019-04-02 RX ADMIN — ASPIRIN 81 MG 81 MG: 81 TABLET ORAL at 09:25

## 2019-04-02 RX ADMIN — SODIUM CHLORIDE: 9 INJECTION, SOLUTION INTRAVENOUS at 18:14

## 2019-04-02 RX ADMIN — PHENYLEPHRINE HYDROCHLORIDE 100 MCG: 10 INJECTION INTRAVENOUS at 13:24

## 2019-04-02 RX ADMIN — Medication 400 MG: at 09:25

## 2019-04-02 RX ADMIN — CEFTRIAXONE SODIUM 1 G: 1 INJECTION, POWDER, FOR SOLUTION INTRAMUSCULAR; INTRAVENOUS at 17:22

## 2019-04-02 RX ADMIN — ONDANSETRON 4 MG: 2 INJECTION INTRAMUSCULAR; INTRAVENOUS at 22:08

## 2019-04-02 RX ADMIN — ONDANSETRON 4 MG: 2 INJECTION INTRAMUSCULAR; INTRAVENOUS at 15:05

## 2019-04-02 RX ADMIN — GABAPENTIN 400 MG: 400 CAPSULE ORAL at 21:55

## 2019-04-02 RX ADMIN — TAMSULOSIN HYDROCHLORIDE 0.4 MG: 0.4 CAPSULE ORAL at 09:24

## 2019-04-02 RX ADMIN — DOCUSATE SODIUM 100 MG: 100 CAPSULE, LIQUID FILLED ORAL at 09:25

## 2019-04-02 RX ADMIN — PROPOFOL 20 MG: 10 INJECTION, EMULSION INTRAVENOUS at 13:07

## 2019-04-02 RX ADMIN — PROPOFOL 40 MG: 10 INJECTION, EMULSION INTRAVENOUS at 13:01

## 2019-04-02 RX ADMIN — PHENYLEPHRINE HYDROCHLORIDE 100 MCG: 10 INJECTION INTRAVENOUS at 13:27

## 2019-04-02 RX ADMIN — DILTIAZEM HYDROCHLORIDE 240 MG: 240 CAPSULE, COATED, EXTENDED RELEASE ORAL at 09:25

## 2019-04-02 RX ADMIN — HYDROMORPHONE HYDROCHLORIDE 1 MG: 2 INJECTION, SOLUTION INTRAMUSCULAR; INTRAVENOUS; SUBCUTANEOUS at 15:18

## 2019-04-02 RX ADMIN — SODIUM CHLORIDE, POTASSIUM CHLORIDE, SODIUM LACTATE AND CALCIUM CHLORIDE: 600; 310; 30; 20 INJECTION, SOLUTION INTRAVENOUS at 12:55

## 2019-04-02 RX ADMIN — ATORVASTATIN CALCIUM 10 MG: 10 TABLET, FILM COATED ORAL at 21:55

## 2019-04-02 RX ADMIN — SODIUM CHLORIDE: 9 INJECTION, SOLUTION INTRAVENOUS at 21:55

## 2019-04-02 RX ADMIN — SODIUM CHLORIDE: 9 INJECTION, SOLUTION INTRAVENOUS at 07:12

## 2019-04-02 RX ADMIN — OXYCODONE HYDROCHLORIDE 5 MG: 5 TABLET ORAL at 14:47

## 2019-04-02 RX ADMIN — CITALOPRAM HYDROBROMIDE 10 MG: 20 TABLET ORAL at 09:25

## 2019-04-02 ASSESSMENT — PAIN DESCRIPTION - ONSET: ONSET: GRADUAL

## 2019-04-02 ASSESSMENT — PAIN SCALES - GENERAL
PAINLEVEL_OUTOF10: 10
PAINLEVEL_OUTOF10: 0
PAINLEVEL_OUTOF10: 0
PAINLEVEL_OUTOF10: 10

## 2019-04-02 ASSESSMENT — PAIN DESCRIPTION - PAIN TYPE: TYPE: SURGICAL PAIN

## 2019-04-02 ASSESSMENT — PAIN - FUNCTIONAL ASSESSMENT: PAIN_FUNCTIONAL_ASSESSMENT: 0-10

## 2019-04-02 ASSESSMENT — PAIN DESCRIPTION - DESCRIPTORS: DESCRIPTORS: SHARP

## 2019-04-02 ASSESSMENT — PAIN DESCRIPTION - LOCATION: LOCATION: ABDOMEN

## 2019-04-02 ASSESSMENT — PAIN DESCRIPTION - FREQUENCY: FREQUENCY: INTERMITTENT

## 2019-04-02 NOTE — PROGRESS NOTES
Patient received dinner and ate some. Patient complaint of pain but pain meds are not due. Patient oxygen level is in the high 90's at 4 L of oxygen. Patient repositioned and will continue to monitor.

## 2019-04-02 NOTE — H&P
History and Physical        HISTORY OF PRESENT ILLNESS:     80 y. o. male with history of COPD, chronic respiratory failure on home oxygen 1.5-2.5 L , dementia, dysphagia , esophageal stricture, severe GERD. Status post EGD with stricture dilation last month. Previously has had an esophageal stent , which migrated and was subsequently removed last month . PEG tube placement was recommended for dysphagia, but patient refused. Patient now presenting with complaint of increased abdominal pain, difficulty swallowing, agreeable now for PEG tube placement. CT abdomen ER did not show any acute abnormalities. Patient has been able to take his medications crushed with pudding. Oral intake has declined. He has had long-term issues with weight loss  Patient complains of chronic pain from osteoarthritis, he says all his joints are hurting. He denies any shortness of breath now, he is on oxygen 2 L. Coughing up some clear sputum. No fevers. Chest x-ray did not show an acute infiltrates.          Past Medical History:   Diagnosis Date    [facility]) [facility]     Dementia     Emphysema of lung ([facility]     Esophageal stricture     Nondependent tobacco use disorder        Past Surgical History:   Procedure Laterality Date    UPPER GASTROINTESTINAL ENDOSCOPY N/A 12/12/2018    EGD DILATION BALLOON performed by Jayce Head MD at [place] [id number] N/A 12/13/2018    EGD DILATION BALLOON performed by Jayce Head MD at [place] [address]  12/23/2018    foreign body removal, balloon dilation    UPPER GASTROINTESTINAL ENDOSCOPY N/A 12/23/2018    EGD FOREIGN BODY REMOVAL performed by Jayce Head MD at [place] [address]  12/23/2018    EGD DILATION BALLOON performed by Jayce Head MD at [address] [place] [address] GASTROINTESTINAL ENDOSCOPY N/A 1/30/2019    EGD W/20X8 STENT PLACEMENT W/ANES. (9:30) performed by Warren Evans MD at [place] [address]  1/30/2019    EGD DILATION BALLOON performed by Warren Evans MD at [place] [address]  1/30/2019    EGD BIOPSY performed by Warren Evans MD at [place] [address]  03/01/2019    Esophageal stent removed    UPPER GASTROINTESTINAL ENDOSCOPY N/A 3/1/2019    EGD DILATION BALLOON performed by Warren Evans MD at [place] [address]  3/1/2019    EGD STENT REMOVAL performed by Warren Evans MD at [place] [address] N/A 3/4/2019    EGD BIOPSY performed by Nelda Ward DO at [place] [address]  3/4/2019    EGD SUBMUCOSAL/BOTOX INJECTION performed by Nelda Ward DO at [place] [address]  3/4/2019    EGD DILATION BALLOON performed by Nelda Ward DO at [address] [facility]       Patient is allergic to heparin. Prior to Admission medications    Medication Sig Start Date End Date Taking?  Authorizing Provider   magnesium oxide (MAG-OX) 400 (241.3 Mg) MG TABS tablet Take 1 tablet by mouth daily 3/8/19  Yes Judge Serenity MD   diltiazem (DILACOR XR) 240 MG extended release capsule Take 240 mg by mouth daily   Yes Historical Provider, MD   pantoprazole sodium (PROTONIX) 40 MG PACK packet Take 1 packet by mouth 2 times daily (before meals) 12/25/18  Yes Mandi Lugo MD   aspirin 81 MG tablet Take 81 mg by mouth daily   Yes Historical Provider, MD   atorvastatin (LIPITOR) 10 MG tablet Take 10 mg by mouth nightly   Yes Historical Provider, MD   citalopram (CELEXA) 10 MG tablet Take 10 mg by mouth daily   Yes Historical Provider, MD   docusate (COLACE) 50 MG/5ML liquid Take 100 mg by mouth 2 times daily   Yes Historical Provider, MD   gabapentin (NEURONTIN) 400 MG capsule Take 400 mg by mouth 3 times daily. .   Yes Historical Provider, MD   ipratropium (ATROVENT) 0.02 % nebulizer solution Take 1.25 mg by nebulization every 4 hours as needed for Wheezing   Yes Historical Provider, MD   nitroGLYCERIN (NITROSTAT) 0.4 MG SL tablet Place 0.4 mg under the tongue every 5 minutes as needed for Chest pain up to max of 3 total doses. If no relief after 1 dose, call 911.    Yes Historical Provider, MD   albuterol sulfate  (90 Base) MCG/ACT inhaler Inhale 2 puffs into the lungs every 6 hours as needed for Wheezing   Yes Historical Provider, MD   rivastigmine (EXELON) 4.6 MG/24HR Place 1 patch onto the skin daily   Yes Historical Provider, MD   umeclidinium-vilanterol (ANORO ELLIPTA) 62.5-25 MCG/INH AEPB inhaler Inhale 1 puff into the lungs daily 12/12/18  Yes Historical Provider, MD   sucralfate (CARAFATE) 1 GM/10ML suspension Take 1 g by mouth 4 times daily 12/11/18  Yes Historical Provider, MD   tamsulosin (FLOMAX) 0.4 MG capsule Take 0.4 mg by mouth daily   Yes Historical Provider, MD       Social History     Socioeconomic History    Marital status: Single     Spouse name: None    Number of children: None    Years of education: None    Highest education level: None   Occupational History    None   Social Needs    Financial resource strain: None    Food insecurity:     Worry: None     Inability: None    Transportation needs:     Medical: None     Non-medical: None   Tobacco Use    Smoking status: Current Every Day Smoker     Packs/day: 1.00     Years: 50.00     Pack years: 50.00     Types: Cigarettes    Smokeless tobacco: Never Used   Substance and Sexual Activity    Alcohol use: No    Drug use: No    Sexual activity: Not Currently   Lifestyle    Physical activity:     Days per week: None     Minutes per session: None    Stress: None Relationships    Social connections:     Talks on phone: None     Gets together: None     Attends Yazdanism service: None     Active member of club or organization: None     Attends meetings of clubs or organizations: None     Relationship status: None    Intimate partner violence:     Fear of current or ex partner: None     Emotionally abused: None     Physically abused: None     Forced sexual activity: None   Other Topics Concern    None   Social History Narrative    None       Family History   Problem Relation Age of Onset    Heart Disease Mother     Diabetes Mother     Cancer Father        REVIEW OF SYSTEMS:   Constitutional: Negative for fever   HEENT: Negative for sore throat   Eyes: Negative for redness   Respiratory: Negative for dyspnea, cough   Cardiovascular: Negative for chest pain   Gastrointestinal: Negative for vomiting, diarrhea   Positive for abdominal pain and dysphagia   Genitourinary: Negative for hematuria   Musculoskeletal: Positive for arthralgias   Skin: Negative for rash   Neurological: Negative for syncope   Hematological: Negative for adenopathy   Psychiatric/Behavorial: Positive  for anxiety           On Physical Examination    Vitals:    04/02/19 0915   BP: 116/67   Pulse: 88   Resp: 18   Temp: 98 °F (36.7 °C)   SpO2: 99%       Gen: No distress. Alert. Awake and oriented to time place and person . Is a little irritable now secondary to chronic pain from his arthritic joints . On supplemental oxygen 2 L   Eyes: PERRL. No sclera icterus. No conjunctival injection. ENT: No discharge. Pharynx clear. Neck: Trachea midline. Normal thyroid. Resp: No accessory muscle use. diminished breath sounds with bilateral mild rhonchi . CV: Regular rate. Regular rhythm. No murmur or rub. No edema. GI: Non-tender. Non-distended. No masses. No organomegaly. Normal bowel sounds. No hernia. Skin: Warm and dry. No nodule on exposed extremities. No rash on exposed extremities.    Lymph: No cervical LAD. No supraclavicular LAD. M/S: No cyanosis. No joint deformity. No clubbing. Intact peripheral pulses. Brisk cap refill, < 2 secs  Neuro: Awake. Reflexes 2+ symmetric bilaterally   Psych: Oriented x 3. Some anxiety. CBC:   Recent Labs     04/01/19  1505 04/02/19  0520   WBC 7.3 6.0   HGB 10.6* 9.7*   HCT 33.2* 30.5*   MCV 86.7 88.1    182     BMP:   Recent Labs     04/01/19  1505 04/02/19  0520    139   K 4.6 4.5   CL 99 101   CO2 34* 33*   BUN 9 8   CREATININE <0.5* <0.5*     LIVER PROFILE:   Recent Labs     04/01/19  1505   AST 16   ALT 12   LIPASE 20.0   BILITOT <0.2   ALKPHOS 78     PT/INR:   Recent Labs     04/01/19  1505   PROTIME 12.7   INR 1.11     APTT:   Recent Labs     04/01/19  1505   APTT 28.4     UA:  Recent Labs     04/01/19  1600   COLORU Yellow   PHUR 7.5   WBCUA 20-50*   RBCUA 0-2   MUCUS 1+*   YEAST Present*   BACTERIA 1+*   CLARITYU SL CLOUDY*   SPECGRAV 1.010   LEUKOCYTESUR LARGE*   UROBILINOGEN 0.2   BILIRUBINUR Negative   BLOODU Negative   GLUCOSEU Negative   AMORPHOUS 1+*       Radiology   IR PICC WO SQ PORT/PUMP > 5 YEARS   Final Result   Successful placement of PICC line. CT ABDOMEN PELVIS W IV CONTRAST Additional Contrast? None   Final Result   No evidence for acute intra-or intrapelvic pathology. Status post aortoiliac bypass grafting. Large right renal cyst measuring 9 x 8 cm. Multiple additional too small to characterize hypodense lesions within the   kidneys bilaterally, likely hemorrhagic cyst.  Attention on follow-up   suggested. Multiple intravesicular bladder calculi. XR CHEST PORTABLE   Final Result   1. No acute abnormality.                Old records from recent admission reviewed and summarized above in HPI        ASSESSMENT:    Principal Problem:    Esophageal stricture  Active Problems:    Chronic obstructive pulmonary disease (HCC)    Tobacco abuse    Dysphagia    Moderate protein-calorie malnutrition ([facility] [address]    Dementia without behavioral disturbance  Resolved Problems:    * No resolved hospital problems. *      PLAN:    # Esophageal stricture  # Dysphagia    # severe GERD  # abdominal pain  Seen By GI.    - > Previous history - Patient had an esophageal stent placed which had migrated and  the stent was removed, last month in March 2019. PEG placement considered, pt refused. Status post rpt  EGD  3/ 4 - > s/P esophageal dilatation . MBS  3/5 completed. started on dys 3 diet , and discharged on the same. Currently back with recurrent dysphagia. CT abdomen as above-an acute intra-abdominal pathology except for renal cysts and bladder calculi   continue  PPI  Plan PEG tube placement today . NPO, IV fluids       # Anemia  Monitor H&H     #  COPD, chronic hypoxic respiratory failure  -Continue supplemental oxygen at 2 L inhaled bronchodilators    # Osteoarthritis, chronic pain  - will order IV Dilaudid as needed for pain control now as he is  NPO  -Start PO oxycodone as needed after procedure       #  Dementia   - Some cognitive deficits, however well-oriented.  Seen by psychiatry  last admission- >  Patient  well oriented , and deemed competent by psychiatry to make his own decisions.   Continue Razadyne       # UTI  - cont Rocephin   - F/U cultures     PICC line placement in ED for poor access        SCds for DVT prophylaxis    Diet NPO Time Specified   Full Code          Keon Stephenson 4/2/2019 12:18 PM

## 2019-04-02 NOTE — FLOWSHEET NOTE
04/01/19 2200   Vital Signs   Temp 97.2 °F (36.2 °C)   Pulse 97   Resp 18   /70   BP Location Left upper arm   Patient Position Semi fowlers   Level of Consciousness 0   MEWS Score 1   Patient Currently in Pain Denies   Oxygen Therapy   SpO2 97 %   O2 Device Nasal cannula   O2 Flow Rate (L/min) 2 L/min   Shift assessment complete; see flow sheet. Scheduled medications administered; see MAR. Call light and bedside table within reach, bed in low, locked position, side rails up, pt encouraged to call for assistance with ambulation or transfer. Pt denies further needs at this time. Nurse and staff will continue to monitor throughout shift.

## 2019-04-02 NOTE — OP NOTE
Percutaneous Endoscopic Gastrostomy Note    Patient:   Brice Ortiz   YOB: 1950   Facility:   [facility] [Inpatient]   Referring/PCP: SIMONA Henry CNP  Procedure:   Esophagogastroduodenoscopy with placement of a percutaneous endoscopic gastrostomy tube  Date:     4/2/2019   Endoscopist:  Vitaly Corbin     Preoperative Diagnosis:  Feeding Difficulties    Postoperative Diagnosis:  Feeding Difficulties    Preprocedure medications: One gram of Cefazolin was given. immediately prior to the procedure. Anesthesia:  MAC    Estimated blood loss: Minimal    Complications: None    Description of Procedure:  Informed consent was obtained from the patient after explanation of the procedure including indications, description of the procedure,  benefits and possible risks and complications of the procedure, and alternatives. Questions were answered. The patient's history was reviewed and a directed physical examination was performed prior to the procedure. Patient recieved prophylactic antibiotics immediately prior to the start of the procedure and was monitored throughout the procedure with pulse oximetry and periodic assessment of vital signs. Patient was sedated as noted above. The Nursing staff and I performed a time out. With the patient in supine position, Olympus  flexible endoscope was introduced and passed without difficulty. Visualization of the esophagus, stomach, and duodenum was performed and retroflexed view of the proximal stomach was obtained. The scope was passed to the 2nd portion of the duodenum. Esophagus: A stricture was again evident at the distal esophagus. The scope was difficult to pass. The scope was able to maneuver into the stomach which dilated the patient's stricture. Stomach: normal  Duodenum: normal    No contraindication to placement of the gastrostomy tube was appreciated.      The site for placement of the gastrostomy tube was identified with

## 2019-04-02 NOTE — CARE COORDINATION
Case Management Assessment  Initial Evaluation    Date/Time of Evaluation: 4/2/2019 2:19 PM  Assessment Completed by: Priscila Loja    Patient Name: Enrique Redmond  YOB: 1950  Diagnosis: Esophageal stricture [K22.2]  Date / Time: 4/1/2019  1:16 PM  Admission status/Date: Inpatient 4/2/2019   Chart Reviewed: Yes      Patient Interviewed: Yes   Family Interviewed:  No      Hospitalization in the last 30 days:  Yes 2/28-3/7    Contacts  :   Naif More Postal  Relationship to Patient: spouse  Phone Number:   (number not in service) using dtrs phone [phone]  Alternate Contact:   Neetu Arreaga  Relationship to Patient:   sister  Phone Number:   [phone]  : Naeem Valerie   Relationship to patient: daughter in law  Phone Number: [phone]    Met with:    Current PCP: ALEXANDRIA Kirkland    Financial  Medicaid  Precert required for SNF : Y, N        3 night stay required - Y, N    ADLS  Support Systems: Family Members  Transportation: EMS transportation    Meal Preparation: family    Housing  Home Environment: lives w/spouse and daughter in mobile home (24/7 supervision) Steps: 3  Plans to Return to Present Housing: Yes  Other Identified Issues: [address]  Currently active with [address] : No  Type of Home Care Services: None  Passport/Waiver : No  :                      Phone Number:    Passport/Waiver Services: [address]   DME Provider: [employer]  Equipment: Walker__Cane__RTS__ BSC__Shower Chair__  02_X_ HHN_X_ CPAP__  BiPap__  Hospital Bed__ W/C___ Other__________      Has Home O2 in place on admit: yes  Informed of need to bring portable home O2 tank on day of discharge for nursing to connect prior to leaving:  Yes (EMS transport)  Verbalized agreement/Understanding:   NA    Community Service Affiliation  Dialysis:  No    · Name:  · Location  · Dialysis Schedule:  · Phone: · Fax:    Outpatient PT/OT: No    Cancer Center: No     CHF Clinic: No     Pulmonary Rehab: No  Pain Clinic: No  Community Mental Health: No    Wound Clinic: No     Other:     DISCHARGE PLAN: Chart reviewed. Met with pt and spoke with pt sister, Shauna Candelario and explained the role of the CM. Pts' wife Chelsea Parmar" currently does not have access to a phone and has been communicating through Shauna Candelario and using her daughters phone. Attempted to call Conner Hand on her daughters phone but was unable to leave a VM as it was full. Pt refuses SNF placement. Discussed need for home care for SN for management of new tube feedings. Family to discuss and choose providers. [facility] Ameena Han, nurse liaison) consulted to assist with identifying In network [facility] providers. Pt will need ambulance transport back home. +eCOC, +CM following    13:50 per [facility]) pt has been accepted for [facility] by [facility]. Explained Case Management role/services.

## 2019-04-02 NOTE — PROGRESS NOTES
Pt yelling out c/o lower back and leg pain 10/10 and vomiting. Pt cleaned up r/t emesis on face and chest, medicated with zofran 4 mg IVP. Pt yelling loudly and neighbor requested move out of room. Dr. Clarice Young notified of emesis after pain medication oxycodone 5 mg po @ [phone]  by primary RN. Spoke with Dr. Clarice Young r/t patient complaints and recent medication administrations, she was ok with administration on prn dilaudid 1 mg IVP at this time. Updated primary RN Karyle Flow to monitor patient and VS r/t close administration of narcotics.

## 2019-04-02 NOTE — DISCHARGE INSTR - COC
Continuity of Care Form    Patient Name: Madai Tapia   :  1950  MRN:  [id number]    Admit date:  2019  Discharge date:  ***    Code Status Order: Full Code   Advance Directives:   Advance Care Flowsheet Documentation     Date/Time Healthcare Directive Type of Healthcare Directive Copy in [address] Agent's Name Healthcare Agent's Phone Number    19 1824  No, patient does not have an advance directive for healthcare treatment -- -- -- -- --          Admitting Physician:  Georgette Benjamin MD  PCP: Madeleine Levi APRN - CNP    Discharging Nurse: [facility] Unit/Room#: ENDO/NONE  Discharging Unit Phone Number: ***    Emergency Contact:   Extended Emergency Contact Information  Primary Emergency Contact: Sofia Yuennda  Address: [address] [place] [address] [address], [address]  Home Phone: [phone]  Relation: Brother/Sister  Secondary Emergency Contact: [facility] In [facility] [address] Phone: [phone]  Relation: Other    Past Surgical History:  Past Surgical History:   Procedure Laterality Date    UPPER GASTROINTESTINAL ENDOSCOPY N/A 2018    EGD DILATION BALLOON performed by Arabella Rosado MD at [address] [place] [address] N/A 2018    EGD DILATION BALLOON performed by Arabella Rosado MD at [address] [place] [address]  2018    foreign body removal, balloon dilation    UPPER GASTROINTESTINAL ENDOSCOPY N/A 2018    EGD FOREIGN BODY REMOVAL performed by Arabella Rosado MD at [address] [place] [address]  2018    EGD DILATION BALLOON performed by Arabella Rosado MD at [address] [place] [address] 2019    EGD W/20X8 STENT PLACEMENT W/ANES.  (9:30) performed by Arabella Rosado MD at [address] [place] [address] 1/30/2019    EGD DILATION BALLOON performed by Jazmyn Ramires MD at [place] [address]  1/30/2019    EGD BIOPSY performed by Jazmyn Ramires MD at [place] [address]  03/01/2019    Esophageal stent removed    UPPER GASTROINTESTINAL ENDOSCOPY N/A 3/1/2019    EGD DILATION BALLOON performed by Jazmyn Ramires MD at [place] [address]  3/1/2019    EGD STENT REMOVAL performed by Jazmyn Ramires MD at [place] [address] N/A 3/4/2019    EGD BIOPSY performed by Chepe Lott DO at [place] [address]  3/4/2019    EGD SUBMUCOSAL/BOTOX INJECTION performed by Chepe Lott DO at [place] [address]  3/4/2019    EGD DILATION BALLOON performed by Chepe Lott DO at [place] [address]  04/02/2019    EGD with PEG placement       Immunization History:   Immunization History   Administered Date(s) Administered    Influenza Virus Vaccine 10/04/2018    Influenza, [place], 3 Years and older, IM (Fluzone 3 yrs and older or Afluria 5 yrs and older) 11/16/2016    Influenza, [place], 6 mo and older, IM, PF (Flulaval, Fluarix) 12/23/2018    Pneumococcal 13-valent Conjugate (Hzdxmsd02) 12/23/2018    Pneumococcal Polysaccharide (Upcyradpg75) 11/16/2016       Active Problems:  Patient Active Problem List   Diagnosis Code    Advanced COPD ([facility] J44.9    Tobacco abuse Z72.0    Chronic respiratory failure with hypoxia ([facility] J96.11    Community acquired pneumonia J18.9    Dysphagia R13.10    Esophageal stricture K22.2    Sepsis ([address] A41.9    HCAP (healthcare-associated pneumonia) J18.9    Moderate protein-calorie malnutrition ([facility] E44.0    Migration of esophageal stent T85.528A    Dementia without behavioral disturbance F03.90       Isolation/Infection:   Isolation          No Isolation            Nurse Assessment:  Last Vital Signs: BP (!) 87/58   Pulse 88   Temp 96.9 °F (36.1 °C) (Oral)   Resp 17   Ht 5' 5\" (1.651 m)   Wt 120 lb (54.4 kg)   SpO2 96%   BMI 19.97 kg/m²     Last documented pain score (0-10 scale): Pain Level: 0  Last Weight:   Wt Readings from Last 1 Encounters:   04/01/19 120 lb (54.4 kg)     Mental Status:  {IP PT MENTAL STATUS:20030}    IV Access:  { CATRINA IV ACCESS:476283226}    Nursing Mobility/ADLs:  Walking   {P DME CXLY:414623893}  Transfer  {P DME JDVB:431802713}  Bathing  {P DME NVJE:470954764}  Dressing  {P DME HKXD:583562630}  Toileting  {P DME FIYB:287977968}  Feeding  {[facility] DME VNSI:422063799}  Med Admin  {[facility] DME THOMAS:407474512}  Med Delivery   { CATRINA MED Delivery:550389119}    Wound Care Documentation and Therapy:        Elimination:  Continence:   · Bowel: {YES / OD:85528}  · Bladder: {YES / GR:61716}  Urinary Catheter: {Urinary Catheter:19518}   Colostomy/Ileostomy/Ileal Conduit: {YES / TI:27444}       Date of Last BM: ***    Intake/Output Summary (Last 24 hours) at 4/2/2019 1438  Last data filed at 4/2/2019 1324  Gross per 24 hour   Intake 938.51 ml   Output 150 ml   Net 788.51 ml     I/O last 3 completed shifts:   In: 888.5 [I.V.:888.5]  Out: 150 [Urine:150]    Safety Concerns:     [address] [employer] Safety Concerns:307150897}    Impairments/Disabilities:      [address] [employer] Impairments/Disabilities:200368810}    Nutrition Therapy:  Current Nutrition Therapy:   [employer] Diet List:536746385}    Routes of Feeding: {P DME Other Feedings:415304650}  Liquids: {Slp liquid thickness:00659}  Daily Fluid Restriction: {P DME Yes amt example:281285250}  Last Modified Barium Swallow with Video (Video Swallowing Test): {Done Not Done CUOD:722484141}    Treatments at the Time of Hospital Discharge:   Respiratory Treatments: ***  Oxygen Therapy:  {Therapy; copd oxygen:18506}  Ventilator:    [facility] CC Vent RFRJ:332656419}    Rehab Therapies: {THERAPEUTIC INTERVENTION:9858289256}  Weight Bearing Status/Restrictions: [address] [facility] CAM Weight Bearin}  Other Medical Equipment (for information only, NOT a DME order):  {EQUIPMENT:696073374}  Other Treatments: ***    Patient's personal belongings (please select all that are sent with patient):  {CHP DME Belongings:497229155}    RN SIGNATURE:  {Esignature:767207178}    CASE MANAGEMENT/SOCIAL WORK SECTION    Inpatient Status Date: 2019    Readmission Risk Assessment Score:  Readmission Risk              Risk of Unplanned Readmission:        20           Discharging to Facility/ Agency   · Name: [facility]  · Address:  · Phone: [phone]  · Fax: [phone]    erimed  [phone]      / signature: Electronically signed by Portia Gottlieb RN on 19 at 2:00 PM    [address] 1Cal/Fiber liqd  1 Can by Gastrostomy Tube route 4 times daily    Prognosis: {Prognosis:1655394644}    Condition at Discharge: [address] Patient Condition:885157194}    Rehab Potential (if transferring to Rehab): {Prognosis:4114388093}    Recommended Labs or Other Treatments After Discharge: ***    Physician Certification: I certify the above information and transfer of Ghislaine Domingo  is necessary for the continuing treatment of the diagnosis listed and that he requires {Admit to Appropriate Level of Care:44251} for {GREATER/LESS:615022745} 30 days.      Update Admission H&P: {CHP DME Changes in IEUYA:106189596}    PHYSICIAN SIGNATURE:  Electronically signed by Darnell Bernstein RN on 19 at 2:00 PM

## 2019-04-02 NOTE — ANESTHESIA PRE PROCEDURE
puff into the lungs daily 12/12/18  Yes Historical Provider, MD   sucralfate (CARAFATE) 1 GM/10ML suspension Take 1 g by mouth 4 times daily 12/11/18  Yes Historical Provider, MD   tamsulosin (FLOMAX) 0.4 MG capsule Take 0.4 mg by mouth daily   Yes Historical Provider, MD       Current medications:    Current Facility-Administered Medications   Medication Dose Route Frequency Provider Last Rate Last Dose    [START ON 4/3/2019] diltiazem (CARDIZEM) tablet 60 mg  60 mg Oral 4 times per day Mikayla Simons MD        oxyCODONE (ROXICODONE) immediate release tablet 5 mg  5 mg Oral Q6H PRN Mikayla Simons MD        HYDROmorphone (DILAUDID) injection 1 mg  1 mg Intravenous Q4H PRN Mikayla Simons MD        aspirin chewable tablet 81 mg  81 mg Oral Daily Brandee Freeman PA-C   81 mg at 04/02/19 0925    atorvastatin (LIPITOR) tablet 10 mg  10 mg Oral Nightly Brandee Freeman PA-C   10 mg at 04/01/19 2204    citalopram (CELEXA) tablet 10 mg  10 mg Oral Daily Brandee Freeman PA-C   10 mg at 04/02/19 2659    docusate sodium (COLACE) capsule 100 mg  100 mg Oral BID Brandee Freeman PA-C   100 mg at 04/02/19 1319    gabapentin (NEURONTIN) capsule 400 mg  400 mg Oral TID Carlitos Cifuentes PA-C   400 mg at 04/02/19 0925    magnesium oxide (MAG-OX) tablet 400 mg  400 mg Oral Daily Brandee Freeman PA-C   400 mg at 04/02/19 0925    pantoprazole sodium (PROTONIX) packet 40 mg  40 mg Oral BID AC Maureenedin Freeman PA-C        rivastigmine (EXELON) 4.6 MG/24HR 1 patch  1 patch Transdermal Daily Brandee Freeman PA-C   1 patch at 04/02/19 1007    sucralfate (CARAFATE) tablet 1 g  1 g Oral 4 times per day Carlitos Cifuentes PA-C        tamsulosin (FLOMAX) capsule 0.4 mg  0.4 mg Oral Daily Brandee Freeman PA-C   0.4 mg at 04/02/19 0924    sodium chloride flush 0.9 % injection 10 mL  10 mL Intravenous 2 times per day Carlitos Cifuentes PA-C   10 mL at 04/01/19 2203    sodium chloride flush 0.9 % injection 10 mL  10 mL Intravenous PRN Carlitos Cifuentes PA-C        potassium chloride (KLOR-CON M) extended release tablet 40 mEq  40 mEq Oral PRN Marzella Adam PA-C        Or    potassium bicarb-citric acid (EFFER-K) effervescent tablet 40 mEq  40 mEq Oral PRN Jagdeepla Adam, PA-C        Or    potassium chloride 10 mEq/100 mL IVPB (Peripheral Line)  10 mEq Intravenous PRN Jelena Medina, PA-C        magnesium sulfate 1 g in dextrose 5% 100 mL IVPB  1 g Intravenous PRN Jelena Medina PA-C        magnesium hydroxide (MILK OF MAGNESIA) 400 MG/5ML suspension 30 mL  30 mL Oral Daily PRN Quynh Gordo Gilb, PA-C        ondansetron (ZOFRAN) injection 4 mg  4 mg Intravenous Q6H PRN Quynh Gordo NIK Freeman-MICHEL        0.9 % sodium chloride infusion   Intravenous Continuous Jelena Medina PA-C 75 mL/hr at 04/02/19 0712      cefTRIAXone (ROCEPHIN) 1 g IVPB in 50 mL D5W minibag  1 g Intravenous Q24H Maureen Freeman PA-C        glycopyrrolate-formoterol (BEVESPI) 9-4.8 MCG/ACT inhaler 2 puff  2 puff Inhalation BID Maureen Freeman PA-C        albuterol sulfate  (90 Base) MCG/ACT inhaler 2 puff  2 puff Inhalation Q6H PRN Pao Rhodes MD           Allergies:     Allergies   Allergen Reactions    Heparin      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, 2018       Problem List:    Patient Active Problem List   Diagnosis Code    Advanced COPD ([facility] J44.9    Tobacco abuse Z72.0    Chronic respiratory failure with hypoxia ([facility] J96.11    Community acquired pneumonia J18.9    Dysphagia R13.10    Esophageal stricture K22.2    Sepsis ([address] A41.9    HCAP (healthcare-associated pneumonia) J18.9    Moderate protein-calorie malnutrition ([address] E44.0    Migration of esophageal stent T85.528A    Dementia without behavioral disturbance F03.90       Past Medical History:        Diagnosis Date    COPD (chronic obstructive pulmonary disease) ([address]     Dementia     Emphysema of lung ([address]     Esophageal stricture     Nondependent tobacco use disorder        Past Surgical History:        Procedure Laterality Date    UPPER 50.00     Pack years: 50.00     Types: Cigarettes    Smokeless tobacco: Never Used   Substance Use Topics    Alcohol use: No                                Ready to quit: Not Answered  Counseling given: Not Answered      Vital Signs (Current):   Vitals:    04/01/19 2200 04/02/19 0251 04/02/19 0915 04/02/19 1237   BP: 110/70 107/65 116/67 (!) 84/52   Pulse: 97 85 88 91   Resp: 18 18 18 20   Temp: 97.2 °F (36.2 °C) 97.8 °F (36.6 °C) 98 °F (36.7 °C) 98 °F (36.7 °C)   TempSrc:  Oral Axillary Temporal   SpO2: 97% 100% 99% 97%   Weight:       Height:                                                  BP Readings from Last 3 Encounters:   04/02/19 (!) 84/52   03/07/19 110/70   03/04/19 106/63       NPO Status: Time of last liquid consumption: 0800(sips with pills)                        Time of last solid consumption: 0800(2 bites of yogert with pills)                        Date of last liquid consumption: 04/02/19                        Date of last solid food consumption: 04/02/19    BMI:   Wt Readings from Last 3 Encounters:   04/01/19 120 lb (54.4 kg)   03/01/19 120 lb 1.6 oz (54.5 kg)   01/30/19 120 lb (54.4 kg)     Body mass index is 19.97 kg/m². CBC:   Lab Results   Component Value Date    WBC 6.0 04/02/2019    RBC 3.46 04/02/2019    HGB 9.7 04/02/2019    HCT 30.5 04/02/2019    MCV 88.1 04/02/2019    RDW 18.2 04/02/2019     04/02/2019       CMP:   Lab Results   Component Value Date     04/02/2019    K 4.5 04/02/2019     04/02/2019    CO2 33 04/02/2019    BUN 8 04/02/2019    CREATININE <0.5 04/02/2019    GFRAA >60 04/02/2019    AGRATIO 1.2 04/01/2019    LABGLOM >60 04/02/2019    GLUCOSE 95 04/02/2019    PROT 6.5 04/01/2019    CALCIUM 9.0 04/02/2019    BILITOT <0.2 04/01/2019    ALKPHOS 78 04/01/2019    AST 16 04/01/2019    ALT 12 04/01/2019       POC Tests: No results for input(s): POCGLU, POCNA, POCK, POCCL, POCBUN, POCHEMO, POCHCT in the last 72 hours.     Coags:   Lab Results   Component Value Date    PROTIME 12.7 04/01/2019    INR 1.11 04/01/2019    APTT 28.4 04/01/2019       HCG (If Applicable): No results found for: PREGTESTUR, PREGSERUM, HCG, HCGQUANT     ABGs: No results found for: PHART, PO2ART, SPP5SBG, DHK1PQI, BEART, L2FDZJTY     Type & Screen (If Applicable):  No results found for: LABABO, [address]    Anesthesia Evaluation  Patient summary reviewed no history of anesthetic complications:   Airway: Mallampati: II  TM distance: >3 FB   Neck ROM: full  Mouth opening: > = 3 FB Dental:    (+) edentulous      Pulmonary:   (+) pneumonia:  COPD:                             Cardiovascular:Negative CV ROS                      Neuro/Psych:   Negative Neuro/Psych ROS  (+) psychiatric history (dementia):            GI/Hepatic/Renal: Neg GI/Hepatic/Renal ROS       (-) GERD, liver disease and no renal disease       Endo/Other: Negative Endo/Other ROS       (-) diabetes mellitus               Abdominal:           Vascular: negative vascular ROS. Anesthesia Plan      TIVA     ASA 3       Induction: intravenous. Anesthetic plan and risks discussed with patient. Plan discussed with CRNA. All questions answered and agrees with plan.         Dolores Duarte MD   4/2/2019

## 2019-04-02 NOTE — PROGRESS NOTES
Patient discharged back to [address] [place] [address] on cart via transport. Supplemental O2 is on.

## 2019-04-02 NOTE — PROGRESS NOTES
Shift assessment complete see flowsheets; medication administered see MAR. Call light and personal belongings within reach. Patient denies further needs.

## 2019-04-02 NOTE — CARE COORDINATION
[facility] to [address] has accepted and will see patient on DC.   Phone is [phone]  Fax is [phone]        Rodriguez Pearce  Work mobile: [phone]  [facility] office: [phone]

## 2019-04-02 NOTE — PROGRESS NOTES
Report given to Dr. Milon Blizzard regarding pt. When RN inquired if pt would consider having a PEG tube placed, pat responded with \"I reckon\"    Pt to be NPO after midnight.   MD will see pt in the morning

## 2019-04-02 NOTE — PROGRESS NOTES
Bedside report given to [facility] RN  pt in stable condition no needs at this time.  Call light within reach

## 2019-04-02 NOTE — H&P
Gastroenterology Preop Assessment    Patient:   Michelle Gonzalez   :    1950   Facility:   [facility]  Referring/PCP: SIMONA Salguero - KEON  Date:     2019    Subjective:   Procedure: EGD with dilation, PEG    HPI/Reason for procedure: 81 yo male with COPD, chronic respiratory failure on home O2, dementia, dysphagia, esophageal stricture, severe GERD presents with recurrent issues of difficulty swallowing. He was previously resistant to PEG tube but is now agreeable. He has had long term issues with malnutrition and weight loss. Past Medical History:   Diagnosis Date    [facility]) [facility]     Dementia     Emphysema of lung ([facility]     Esophageal stricture     Nondependent tobacco use disorder      Past Surgical History:   Procedure Laterality Date    UPPER GASTROINTESTINAL ENDOSCOPY N/A 2018    EGD DILATION BALLOON performed by Anand Hobson MD at [place] [address] N/A 2018    EGD DILATION BALLOON performed by Anand Hobson MD at [place] [address]  2018    foreign body removal, balloon dilation    UPPER GASTROINTESTINAL ENDOSCOPY N/A 2018    EGD FOREIGN BODY REMOVAL performed by Anand Hobson MD at [place] [address]  2018    EGD DILATION BALLOON performed by Anand Hobson MD at [place] [address] 2019    EGD W/20X8 STENT PLACEMENT W/ANES.  (9:30) performed by Anand Hobson MD at [place] [address]  2019    EGD DILATION BALLOON performed by Anand Hobson MD at [place] [address]  2019    EGD BIOPSY performed by Anand Hobson MD at [place] [address] 03/01/2019    Esophageal stent removed    UPPER GASTROINTESTINAL ENDOSCOPY N/A 3/1/2019    EGD DILATION BALLOON performed by Hunter Hoskins MD at [place] [address]  3/1/2019    EGD STENT REMOVAL performed by Hunter Hoskins MD at [place] [address] N/A 3/4/2019    EGD BIOPSY performed by Darin Castillo DO at [place] [address]  3/4/2019    EGD SUBMUCOSAL/BOTOX INJECTION performed by Darin Castillo DO at [place] [address]  3/4/2019    EGD DILATION BALLOON performed by Darin Castillo DO at [facility]. [address]:   Social History     Tobacco Use    Smoking status: Current Every Day Smoker     Packs/day: 1.00     Years: 50.00     Pack years: 50.00     Types: Cigarettes    Smokeless tobacco: Never Used   Substance Use Topics    Alcohol use: No     Family:   Family History   Problem Relation Age of Onset    Heart Disease Mother     Diabetes Mother     Cancer Father        Scheduled Medications:    [START ON 4/3/2019] diltiazem  60 mg Oral 4 times per day    aspirin  81 mg Oral Daily    atorvastatin  10 mg Oral Nightly    citalopram  10 mg Oral Daily    docusate sodium  100 mg Oral BID    gabapentin  400 mg Oral TID    magnesium oxide  400 mg Oral Daily    pantoprazole sodium  40 mg Oral BID AC    rivastigmine  1 patch Transdermal Daily    sucralfate  1 g Oral 4 times per day    tamsulosin  0.4 mg Oral Daily    sodium chloride flush  10 mL Intravenous 2 times per day    cefTRIAXone (ROCEPHIN) IV  1 g Intravenous Q24H    glycopyrrolate-formoterol  2 puff Inhalation BID       Current Medications:    Prior to Admission medications    Medication Sig Start Date End Date Taking?  Authorizing Provider   magnesium oxide (MAG-OX) 400 (241.3 Mg) MG TABS tablet Take 1 tablet by mouth daily 3/8/19  Yes Kathryn Kurtz MD   diltiazem (DILACOR XR) 240 MG extended release capsule Take 240 mg by mouth daily   Yes Historical Provider, MD   pantoprazole sodium (PROTONIX) 40 MG PACK packet Take 1 packet by mouth 2 times daily (before meals) 12/25/18  Yes Delano Franklin MD   aspirin 81 MG tablet Take 81 mg by mouth daily   Yes Historical Provider, MD   atorvastatin (LIPITOR) 10 MG tablet Take 10 mg by mouth nightly   Yes Historical Provider, MD   citalopram (CELEXA) 10 MG tablet Take 10 mg by mouth daily   Yes Historical Provider, MD   docusate (COLACE) 50 MG/5ML liquid Take 100 mg by mouth 2 times daily   Yes Historical Provider, MD   gabapentin (NEURONTIN) 400 MG capsule Take 400 mg by mouth 3 times daily. .   Yes Historical Provider, MD   ipratropium (ATROVENT) 0.02 % nebulizer solution Take 1.25 mg by nebulization every 4 hours as needed for Wheezing   Yes Historical Provider, MD   nitroGLYCERIN (NITROSTAT) 0.4 MG SL tablet Place 0.4 mg under the tongue every 5 minutes as needed for Chest pain up to max of 3 total doses. If no relief after 1 dose, call 911.    Yes Historical Provider, MD   albuterol sulfate  (90 Base) MCG/ACT inhaler Inhale 2 puffs into the lungs every 6 hours as needed for Wheezing   Yes Historical Provider, MD   rivastigmine (EXELON) 4.6 MG/24HR Place 1 patch onto the skin daily   Yes Historical Provider, MD   umeclidinium-vilanterol (ANORO ELLIPTA) 62.5-25 MCG/INH AEPB inhaler Inhale 1 puff into the lungs daily 12/12/18  Yes Historical Provider, MD   sucralfate (CARAFATE) 1 GM/10ML suspension Take 1 g by mouth 4 times daily 12/11/18  Yes Historical Provider, MD   tamsulosin (FLOMAX) 0.4 MG capsule Take 0.4 mg by mouth daily   Yes Historical Provider, MD         Current Facility-Administered Medications:     [START ON 4/3/2019] diltiazem (CARDIZEM) tablet 60 mg, 60 mg, Oral, 4 times per day, True James MD    oxyCODONE (ROXICODONE) immediate release tablet 5 mg, 5 mg, Oral, Q6H PRN, True James, MD    HYDROmorphone (DILAUDID) injection 1 mg, 1 mg, Intravenous, Q4H PRN, Nelida Espinosa MD    aspirin chewable tablet 81 mg, 81 mg, Oral, Daily, Gregg Freeman PA-C, 81 mg at 04/02/19 0925    atorvastatin (LIPITOR) tablet 10 mg, 10 mg, Oral, Nightly, Maureen Freeman PA-C, 10 mg at 04/01/19 2204    citalopram (CELEXA) tablet 10 mg, 10 mg, Oral, Daily, Maureen Freeman PA-C, 10 mg at 04/02/19 0925    docusate sodium (COLACE) capsule 100 mg, 100 mg, Oral, BID, Maureen Freeman PA-C, 100 mg at 04/02/19 2178    gabapentin (NEURONTIN) capsule 400 mg, 400 mg, Oral, TID, Maureen Freeman PA-C, 400 mg at 04/02/19 0925    magnesium oxide (MAG-OX) tablet 400 mg, 400 mg, Oral, Daily, Maureen Freeman PA-C, 400 mg at 04/02/19 0925    pantoprazole sodium (PROTONIX) packet 40 mg, 40 mg, Oral, BID AC, Maureen Freeman PA-C    rivastigmine (EXELON) 4.6 MG/24HR 1 patch, 1 patch, Transdermal, Daily, Maureen Freeman PA-C, 1 patch at 04/02/19 1007    sucralfate (CARAFATE) tablet 1 g, 1 g, Oral, 4 times per day, Maureen Freeman PA-C    tamsulosin (FLOMAX) capsule 0.4 mg, 0.4 mg, Oral, Daily, Maureen Freeman PA-C, 0.4 mg at 04/02/19 0924    sodium chloride flush 0.9 % injection 10 mL, 10 mL, Intravenous, 2 times per day, Gregg Freeman PA-C, 10 mL at 04/01/19 2203    sodium chloride flush 0.9 % injection 10 mL, 10 mL, Intravenous, PRN, Maureen Freeman PA-C    potassium chloride (KLOR-CON M) extended release tablet 40 mEq, 40 mEq, Oral, PRN **OR** potassium bicarb-citric acid (EFFER-K) effervescent tablet 40 mEq, 40 mEq, Oral, PRN **OR** potassium chloride 10 mEq/100 mL IVPB (Peripheral Line), 10 mEq, Intravenous, PRN, Maureen Freeman PA-C    magnesium sulfate 1 g in dextrose 5% 100 mL IVPB, 1 g, Intravenous, PRN, Maureen Freeman PA-C    magnesium hydroxide (MILK OF MAGNESIA) 400 MG/5ML suspension 30 mL, 30 mL, Oral, Daily PRN, Alessandro Freeman PA-C    ondansetron (ZOFRAN) injection 4 mg, 4 mg, Intravenous, Q6H PRN, Maureen Freeman PA-C    0.9 % sodium chloride infusion, , Intravenous, Continuous, Brittanie Bloom PA-C, Last Rate: 75 mL/hr at 04/02/19 0712    cefTRIAXone (ROCEPHIN) 1 g IVPB in 50 mL D5W minibag, 1 g, Intravenous, Q24H, Maureen Freeman PA-C    glycopyrrolate-formoterol (BEVESPI) 9-4.8 MCG/ACT inhaler 2 puff, 2 puff, Inhalation, BID, Maureen Freeman PA-C    albuterol sulfate  (90 Base) MCG/ACT inhaler 2 puff, 2 puff, Inhalation, Q6H PRN, Kiesha Hoover MD      Infusions:    sodium chloride 75 mL/hr at 04/02/19 0712     PRN Medications: oxyCODONE, HYDROmorphone, sodium chloride flush, potassium chloride **OR** potassium alternative oral replacement **OR** potassium chloride, magnesium sulfate, magnesium hydroxide, ondansetron, albuterol sulfate HFA  Allergies: Allergies   Allergen Reactions    Heparin      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, 2018         Objective:     Physical Exam:   BP (!) 84/52   Pulse 91   Temp 98 °F (36.7 °C) (Temporal)   Resp 20   Ht 5' 5\" (1.651 m)   Wt 120 lb (54.4 kg)   SpO2 97%   BMI 19.97 kg/m²     HEENT: NCAT  Lungs: CTAB  CV: RRR  Abd: soft, ntd  Ext: dpi    Lab and Imaging Review   Labs:  CBC:   Recent Labs     04/01/19  1505 04/02/19  0520   WBC 7.3 6.0   HGB 10.6* 9.7*   HCT 33.2* 30.5*   MCV 86.7 88.1    182     BMP:   Recent Labs     04/01/19  1505 04/02/19  0520    139   K 4.6 4.5   CL 99 101   CO2 34* 33*   BUN 9 8   CREATININE <0.5* <0.5*     LIVER PROFILE:   Recent Labs     04/01/19  1505   AST 16   ALT 12   LIPASE 20.0   PROT 6.5   BILITOT <0.2   ALKPHOS 78     PT/INR:   Recent Labs     04/01/19  1505   INR 1.11       Pre-Procedure Assessment / Plan:  ASA: Class 3 - A patient with severe systemic disease that limits activity but is not incapacitating  Airway: Mallampati: II (soft palate, uvula, fauces visible)  Level of Sedation Plan:Deep sedation  Post Procedure plan: Return to same level of care      Plan:   1.  EGD with dilation, PEG tube    I assessed the patient and find that the patient is in satisfactory condition to proceed with the planned procedure and sedation plan. I have explained the risk, benefits, and alternatives to the procedure; the patient understands and agrees to proceed.        Deyvi Pineda  4/2/2019

## 2019-04-02 NOTE — PROGRESS NOTES
Patient vital signs see flowsheets. Patient oxygen level was in the low 70's patient was encouraged to take deep breaths oxygen increased to 4 L and patient increased to 91 percent and than to 98. Decreased oxygen back to 3 liters and patient maintained at low 90's Remained in patients room monitoring oxygen level. Patient at 2 liters range from 70-high 80's . Patient was constantly awakened to take deep breaths. Patient would open eyes take breaths and oxygen levels would go down to mid 80's.

## 2019-04-02 NOTE — PROGRESS NOTES
Patient is awake. Complaining of pain at the PEG insertion site. PEG tube is covered with an abdominal binder.

## 2019-04-03 LAB
HCT VFR BLD CALC: 25.5 % (ref 40.5–52.5)
HEMOGLOBIN: 8 G/DL (ref 13.5–17.5)
URINE CULTURE, ROUTINE: NORMAL

## 2019-04-03 PROCEDURE — 6360000002 HC RX W HCPCS: Performed by: INTERNAL MEDICINE

## 2019-04-03 PROCEDURE — 6370000000 HC RX 637 (ALT 250 FOR IP): Performed by: PHYSICIAN ASSISTANT

## 2019-04-03 PROCEDURE — 85014 HEMATOCRIT: CPT

## 2019-04-03 PROCEDURE — 6360000002 HC RX W HCPCS: Performed by: PHYSICIAN ASSISTANT

## 2019-04-03 PROCEDURE — 1200000000 HC SEMI PRIVATE

## 2019-04-03 PROCEDURE — 6370000000 HC RX 637 (ALT 250 FOR IP): Performed by: INTERNAL MEDICINE

## 2019-04-03 PROCEDURE — 2580000003 HC RX 258

## 2019-04-03 PROCEDURE — 99232 SBSQ HOSP IP/OBS MODERATE 35: CPT | Performed by: INTERNAL MEDICINE

## 2019-04-03 PROCEDURE — 2580000003 HC RX 258: Performed by: PHYSICIAN ASSISTANT

## 2019-04-03 PROCEDURE — 85018 HEMOGLOBIN: CPT

## 2019-04-03 RX ORDER — 0.9 % SODIUM CHLORIDE 0.9 %
500 INTRAVENOUS SOLUTION INTRAVENOUS ONCE
Status: COMPLETED | OUTPATIENT
Start: 2019-04-03 | End: 2019-04-03

## 2019-04-03 RX ORDER — SODIUM CHLORIDE 9 MG/ML
INJECTION, SOLUTION INTRAVENOUS
Status: COMPLETED
Start: 2019-04-03 | End: 2019-04-03

## 2019-04-03 RX ADMIN — ATORVASTATIN CALCIUM 10 MG: 10 TABLET, FILM COATED ORAL at 21:05

## 2019-04-03 RX ADMIN — CEFTRIAXONE SODIUM 1 G: 1 INJECTION, POWDER, FOR SOLUTION INTRAMUSCULAR; INTRAVENOUS at 17:30

## 2019-04-03 RX ADMIN — OXYCODONE HYDROCHLORIDE 5 MG: 5 TABLET ORAL at 10:08

## 2019-04-03 RX ADMIN — SODIUM CHLORIDE: 9 INJECTION, SOLUTION INTRAVENOUS at 08:47

## 2019-04-03 RX ADMIN — DOCUSATE SODIUM 100 MG: 100 CAPSULE, LIQUID FILLED ORAL at 21:05

## 2019-04-03 RX ADMIN — PROCHLORPERAZINE EDISYLATE 10 MG: 5 INJECTION INTRAMUSCULAR; INTRAVENOUS at 00:46

## 2019-04-03 RX ADMIN — SODIUM CHLORIDE 500 ML: 9 INJECTION, SOLUTION INTRAVENOUS at 00:48

## 2019-04-03 RX ADMIN — ONDANSETRON 4 MG: 2 INJECTION INTRAMUSCULAR; INTRAVENOUS at 10:08

## 2019-04-03 RX ADMIN — GABAPENTIN 400 MG: 400 CAPSULE ORAL at 21:05

## 2019-04-03 RX ADMIN — Medication 10 ML: at 08:47

## 2019-04-03 RX ADMIN — SUCRALFATE 1 G: 1 TABLET ORAL at 05:08

## 2019-04-03 ASSESSMENT — PAIN DESCRIPTION - ORIENTATION: ORIENTATION: MID

## 2019-04-03 ASSESSMENT — PAIN SCALES - GENERAL: PAINLEVEL_OUTOF10: 9

## 2019-04-03 ASSESSMENT — PAIN DESCRIPTION - DESCRIPTORS: DESCRIPTORS: ACHING

## 2019-04-03 ASSESSMENT — PAIN DESCRIPTION - FREQUENCY: FREQUENCY: INTERMITTENT

## 2019-04-03 ASSESSMENT — PAIN DESCRIPTION - ONSET: ONSET: GRADUAL

## 2019-04-03 ASSESSMENT — PAIN - FUNCTIONAL ASSESSMENT: PAIN_FUNCTIONAL_ASSESSMENT: PREVENTS OR INTERFERES SOME ACTIVE ACTIVITIES AND ADLS

## 2019-04-03 ASSESSMENT — PAIN DESCRIPTION - PROGRESSION: CLINICAL_PROGRESSION: RAPIDLY WORSENING

## 2019-04-03 ASSESSMENT — PAIN DESCRIPTION - PAIN TYPE: TYPE: ACUTE PAIN

## 2019-04-03 ASSESSMENT — PAIN DESCRIPTION - LOCATION: LOCATION: CHEST

## 2019-04-03 NOTE — PROGRESS NOTES
Pt continues to cough up thick, black,brown sputum. Suction with yankauer hooked up at this time. Pt educated on how to use and agrees. Will continue to monitor.

## 2019-04-03 NOTE — PROGRESS NOTES
Pt yelling out, stating he is in pain and is going to get sick. Gave PRN oxycodone and zofran per order. Pt tolerated well. Instructed pt to use call light. Will continue to monitor.

## 2019-04-03 NOTE — PROGRESS NOTES
Dr. Milon Blizzard at the bedside to examine pt. See progress note for details. Stated potentially dietary consult for nutrition purposes.

## 2019-04-03 NOTE — PROGRESS NOTES
Bedside report given to [facility] pt in stable condition no needs at this time.  Call light within reach

## 2019-04-03 NOTE — PROGRESS NOTES
PROGRESS NOTE  S:68 yrs Patient  admitted on 4/1/2019 with Esophageal stricture [K22.2] . Patient vomiting up some coffee grounds per nursing yesterday. No output from G tube. G tube site looks good. Bumper loosened to 2 cm and spins well  Exam:   Vitals:    04/03/19 0849   BP:    Pulse:    Resp:    Temp:    SpO2: 95%      General appearance: alert, appears stated age and cooperative  HEENT: PERRLA  Neck: no adenopathy, no carotid bruit, no JVD, supple, symmetrical, trachea midline and thyroid not enlarged, symmetric, no tenderness/mass/nodules  Lungs: clear to auscultation bilaterally  Heart: regular rate and rhythm, S1, S2 normal, no murmur, click, rub or gallop  Abdomen: soft, non-tender; bowel sounds normal; no masses,  no organomegaly  Extremities: extremities normal, atraumatic, no cyanosis or edema     Medications: Reviewed    Labs:  CBC:   Recent Labs     04/01/19  1505 04/02/19  0520 04/03/19  0520   WBC 7.3 6.0  --    HGB 10.6* 9.7* 8.0*   HCT 33.2* 30.5* 25.5*   MCV 86.7 88.1  --     182  --      BMP:   Recent Labs     04/01/19  1505 04/02/19  0520    139   K 4.6 4.5   CL 99 101   CO2 34* 33*   BUN 9 8   CREATININE <0.5* <0.5*     LIVER PROFILE:   Recent Labs     04/01/19  1505   AST 16   ALT 12   LIPASE 20.0   PROT 6.5   BILITOT <0.2   ALKPHOS 78     PT/INR:   Recent Labs     04/01/19  1505   INR 1.11       IMAGING:      Impression:  41-year-old male with history of COPD,  emphysema, refractory esophageal stricture, status post multiple esophageal dilations, underwent esophageal stent placement about a month ago, returns to the emergency room due to dysphagia. The patient isnoncompliant and also not adherent to recommendations, probably due to  underlying dementia. PEG placed yesterday      Recommendation:  1. Will consult dietary for tube feed recommendations  2. Pureed foods as tolerated  3.  Repeat EGD with dilation in 1-2 weeks, will need serial dilations at short intervals  4. Coffee ground emesis likely from dilation yesterday with passage of scope and peg tube. No gross bleeding.       Nini Whitehead DO  9:12 AM 4/3/2019

## 2019-04-03 NOTE — FLOWSHEET NOTE
04/03/19 0508   Vital Signs   Temp 97.5 °F (36.4 °C)   Temp Source Oral   Pulse 76   Heart Rate Source Monitor   Resp 16   BP 98/64   BP Location Left upper arm   BP Upper/Lower Upper   Patient Position Semi fowlers   Level of Consciousness 0   MEWS Score 2   Oxygen Therapy   SpO2 93 %   O2 Device Nasal cannula   O2 Flow Rate (L/min) 4 L/min   Pt resting with eyes closed. Meds given through PEG tube without difficulty. Held Protonix due to possible clogging of tube, held Cardizem per Dr with lower BP. AM labs drawn. Will continue to monitor.  Call light within reach and bed alarm on

## 2019-04-03 NOTE — PROGRESS NOTES
Pt A/O found with oxygen off 77% placed NC back on with 4 liters in place pt now 94%. Pt had vomited pts gown and linen changed. PM Meds crushed and given through PEG tube. Pt complains of pain but BP low at this time. Pt denies any needs.  Call light within reach bed alarm pad on and will continue to monitor

## 2019-04-03 NOTE — FLOWSHEET NOTE
04/03/19 0849   Oxygen Therapy   SpO2 95 %   Pulse Oximeter Device Mode Continuous   O2 Device Nasal cannula   O2 Flow Rate (L/min) 2 L/min     Turned oxygen down to 2L/NC from 4L/NC as pt insisting it is causing his pain. Demanding it be turned down. Will monitor.

## 2019-04-03 NOTE — PROGRESS NOTES
Pt vomiting black emesis at this time. Pt refusing meds through his PEG tube due to him vomiting pt believes his meds are making him sick. Pt states his last BM was before he came in and he had to \"dig it out\" Pt confused on time and place wondering where his wife is and thinks he is in [employer]

## 2019-04-03 NOTE — PROGRESS NOTES
500 cc bolus ordered due to low BP and pt being in pain. Per Dr if SBP is above 100 PO pain medication can be given. Dr aware of pt vomiting black/dark emesis this could be expected due to PEG tube placement 4/2.  GI to follow up on day shift regarding pt BMs

## 2019-04-03 NOTE — CARE COORDINATION
INTERDISCIPLINARY PLAN OF CARE CONFERENCE    Date/Time: 4/3/2019 3:04 PM  Completed by: Crescencio Rivera Case Management      Patient Name:  Brice Ortiz  YOB: 1950  Admitting Diagnosis: Esophageal stricture [K22.2]     Admit Date/Time:  4/1/2019  1:16 PM    Chart reviewed. Interdisciplinary team met to discuss patient progress and discharge plans. Disciplines included Case Management, Nursing, and Dietitian. Current Status: Inpatient 4/1/2019    PT/OT recommendation:     Anticipated Discharge Date: 4/4/2019  Expected D/C Disposition:  Home  Confirmed plan with spouse: Yes  Discharge Plan Comments: Plan: DC home with [facility]. Pt will need EMS transportation when discharged. Per Scheduling at [facility] if pt is released tomorrow and sent home late evening the Nurse can front load his visit for early Friday am. Pt will need SN, OT/PT and HHA. +eCOC, +CM following.     Home O2 in place on admit: No  Pt informed of need to bring portable home O2 tank on day of discharge for nursing to connect prior to leaving:  No  Verbalized agreement/Understanding:  No

## 2019-04-04 VITALS
DIASTOLIC BLOOD PRESSURE: 67 MMHG | SYSTOLIC BLOOD PRESSURE: 118 MMHG | OXYGEN SATURATION: 95 % | TEMPERATURE: 100 F | HEART RATE: 96 BPM | HEIGHT: 65 IN | BODY MASS INDEX: 19.99 KG/M2 | RESPIRATION RATE: 20 BRPM | WEIGHT: 120 LBS

## 2019-04-04 PROCEDURE — 99238 HOSP IP/OBS DSCHRG MGMT 30/<: CPT | Performed by: INTERNAL MEDICINE

## 2019-04-04 PROCEDURE — 6370000000 HC RX 637 (ALT 250 FOR IP): Performed by: PHYSICIAN ASSISTANT

## 2019-04-04 PROCEDURE — 6370000000 HC RX 637 (ALT 250 FOR IP): Performed by: INTERNAL MEDICINE

## 2019-04-04 RX ADMIN — ASPIRIN 81 MG 81 MG: 81 TABLET ORAL at 09:39

## 2019-04-04 RX ADMIN — OXYCODONE HYDROCHLORIDE 5 MG: 5 TABLET ORAL at 12:24

## 2019-04-04 RX ADMIN — CITALOPRAM HYDROBROMIDE 10 MG: 20 TABLET ORAL at 09:39

## 2019-04-04 RX ADMIN — DILTIAZEM HYDROCHLORIDE 60 MG: 60 TABLET, FILM COATED ORAL at 12:24

## 2019-04-04 RX ADMIN — OXYCODONE HYDROCHLORIDE 5 MG: 5 TABLET ORAL at 18:54

## 2019-04-04 RX ADMIN — PANTOPRAZOLE SODIUM 40 MG: 40 GRANULE, DELAYED RELEASE ORAL at 06:01

## 2019-04-04 RX ADMIN — DILTIAZEM HYDROCHLORIDE 60 MG: 60 TABLET, FILM COATED ORAL at 06:01

## 2019-04-04 RX ADMIN — SUCRALFATE 1 G: 1 TABLET ORAL at 06:01

## 2019-04-04 RX ADMIN — SUCRALFATE 1 G: 1 TABLET ORAL at 12:24

## 2019-04-04 RX ADMIN — Medication 400 MG: at 09:39

## 2019-04-04 ASSESSMENT — PAIN SCALES - GENERAL: PAINLEVEL_OUTOF10: 7

## 2019-04-04 NOTE — PROGRESS NOTES
Spoke with wife - update provided. Sts that the furnace repair man will not be able to come to the house until Monday. Discsussed  has not rounded yet this morning and no report on d/c and [address] set from D/C planning yet.

## 2019-04-04 NOTE — PROGRESS NOTES
PROGRESS NOTE  S:68 yrs Patient  admitted on 4/1/2019 with Esophageal stricture [K22.2] . Today he complains of dysphagia    Exam:   Vitals:    04/04/19 1220   BP: 127/65   Pulse: 118   Resp: 20   Temp:    SpO2:       General appearance: disoriented  HEENT: Oropharynx clear, no lesions  Neck: no adenopathy and supple, symmetrical, trachea midline  Lungs: clear to auscultation bilaterally  Heart: regular rate and rhythm, S1, S2 normal, no murmur, click, rub or gallop  Abdomen: soft, non-tender; bowel sounds normal; no masses,  no organomegaly  Extremities: extremities normal, atraumatic, no cyanosis or edema     Medications: Reviewed    Labs:  CBC:   Recent Labs     04/01/19  1505 04/02/19  0520 04/03/19  0520   WBC 7.3 6.0  --    HGB 10.6* 9.7* 8.0*   HCT 33.2* 30.5* 25.5*   MCV 86.7 88.1  --     182  --      BMP:   Recent Labs     04/01/19  1505 04/02/19  0520    139   K 4.6 4.5   CL 99 101   CO2 34* 33*   BUN 9 8   CREATININE <0.5* <0.5*     LIVER PROFILE:   Recent Labs     04/01/19  1505   AST 16   ALT 12   LIPASE 20.0   PROT 6.5   BILITOT <0.2   ALKPHOS 78     PT/INR:   Recent Labs     04/01/19  1505   INR 1.11     Impression: 67-year-old male with history of COPD, emphysema, and refractory esophageal stricture s/p multiple esophageal dilations, and failed esophageal stent and kenalog injection now admitted with failure to thrive underwent EGD with PEG on 4/2/19    Recommendation:  1. Continue supportive care  2. Advance TF per PEG to goal rate  3. Ok to d/c from GI standpoint  4. Liquid diet only per mouth  5. Will sign off  6.  Call with questions      Pradeep Pham MD  2:31 PM 4/4/2019

## 2019-04-04 NOTE — PROGRESS NOTES
Pt resting, eyes closed, respirs witnessed as e/e, no signs of distress. Call light and bedside table in easy reach.

## 2019-04-04 NOTE — CARE COORDINATION
DISCHARGE ORDER  Date/Time 2019 1:52 PM  Completed by: Prudence Lugo, Case Management    Patient Name: Brice Ortiz    : 1950  Admitting Diagnosis: Esophageal stricture [K22.2]  Admit Date/Time: 2019  1:16 PM    Noted discharge order. Confirmed discharge plan with patient / family (pt): Yes   Discharge Plan: Reviewed chart. Met with pt. Plan home with [facility] for SN,SW,HHA with new TF,+eCOC. Pt refuses SNF. Writer spoke with Barb Mims from [facility] and she will start hhc early 19. AVS and home care orders faxed to Barb Mims. Writer spoke with Tamara Casillas with [facility] and she will check costs for TF with [employer]. Godfrey spoke with Tamara Casillas and she states TF covered at 100% and [employer] will provide supplies this evening. Orders faxed to [facility] per Tamara Casillas. Writer spoke with Hilda Trinidad with [address] and she does not have SW, however, she will let the RN know family needs additional support. Writer spoke with Meagan Joseph from [facility] and pickup time is [phone]. Family and bedside nurse aware of pickup time. Discharge timeout done with TABITHA Sommer. All discharge needs and concerns addressed.

## 2019-04-04 NOTE — PROGRESS NOTES
Progress Note    Admit Date:  4/1/2019    Admitted with dysphagia      PEG tube placed 4/2    Subjective:  Mr. Yomaira Downey is stable . To start Tube feeds per PEG today . weak . Objective:   /67   Pulse 94   Temp 97 °F (36.1 °C) (Oral)   Resp 15   Ht 5' 5\" (1.651 m)   Wt 120 lb (54.4 kg)   SpO2 91%   BMI 19.97 kg/m²       Intake/Output Summary (Last 24 hours) at 4/3/2019 2300  Last data filed at 4/3/2019 2107  Gross per 24 hour   Intake 2760.43 ml   Output 478 ml   Net 2282.43 ml         Physical Exam:  Gen: No distress. Alert. Awake and oriented to place and person . intermittent confusion   Is a little irritable now secondary to chronic pain from his arthritic joints . On supplemental oxygen 2 L   Eyes: PERRL. No sclera icterus. No conjunctival injection. ENT: No discharge. Pharynx clear. Neck: Trachea midline. Normal thyroid. Resp: No accessory muscle use. diminished breath sounds with bilateral mild rhonchi . CV: Regular rate. Regular rhythm. No murmur or rub. No edema. GI: Non-tender. Non-distended. No masses. No organomegaly. Normal bowel sounds. No hernia. PEG +  Skin: Warm and dry. No nodule on exposed extremities. No rash on exposed extremities. Lymph: No cervical LAD. No supraclavicular LAD. M/S: No cyanosis. No joint deformity. No clubbing. Intact peripheral pulses. Brisk cap refill, < 2 secs  Neuro: Awake. Reflexes 2+ symmetric bilaterally   Psych: Oriented x 2.  Some anxiety.          Medications:   Scheduled Meds:   diltiazem  60 mg Oral 4 times per day    aspirin  81 mg Oral Daily    atorvastatin  10 mg Oral Nightly    citalopram  10 mg Oral Daily    docusate sodium  100 mg Oral BID    gabapentin  400 mg Oral TID    magnesium oxide  400 mg Oral Daily    pantoprazole sodium  40 mg Oral BID AC    rivastigmine  1 patch Transdermal Daily    sucralfate  1 g Oral 4 times per day    tamsulosin  0.4 mg Oral Daily    sodium chloride flush  10 mL Intravenous 2 times per day    cefTRIAXone (ROCEPHIN) IV  1 g Intravenous Q24H    glycopyrrolate-formoterol  2 puff Inhalation BID       Continuous Infusions:      Data:  CBC:   Recent Labs     04/01/19  1505 04/02/19  0520 04/03/19  0520   WBC 7.3 6.0  --    RBC 3.83* 3.46*  --    HGB 10.6* 9.7* 8.0*   HCT 33.2* 30.5* 25.5*   MCV 86.7 88.1  --    RDW 17.9* 18.2*  --     182  --      BMP:   Recent Labs     04/01/19  1505 04/02/19  0520    139   K 4.6 4.5   CL 99 101   CO2 34* 33*   BUN 9 8   CREATININE <0.5* <0.5*     BNP: No results for input(s): BNP in the last 72 hours. PT/INR:   Recent Labs     04/01/19  1505   PROTIME 12.7   INR 1.11     APTT:   Recent Labs     04/01/19  1505   APTT 28.4     CARDIAC ENZYMES: No results for input(s): CKMB, CKMBINDEX, TROPONINI in the last 72 hours. Invalid input(s): CKTOTAL;3  FASTING LIPID PANEL:No results found for: CHOL, HDL, TRIG  LIVER PROFILE:   Recent Labs     04/01/19  1505   AST 16   ALT 12   BILITOT <0.2   ALKPHOS 78          IR PICC WO SQ PORT/PUMP > 5 YEARS   Final Result   Successful placement of PICC line. CT ABDOMEN PELVIS W IV CONTRAST Additional Contrast? None   Final Result   No evidence for acute intra-or intrapelvic pathology. Status post aortoiliac bypass grafting. Large right renal cyst measuring 9 x 8 cm. Multiple additional too small to characterize hypodense lesions within the   kidneys bilaterally, likely hemorrhagic cyst.  Attention on follow-up   suggested. Multiple intravesicular bladder calculi. XR CHEST PORTABLE   Final Result   1. No acute abnormality. Assessment:  Principal Problem:    Esophageal stricture  Active Problems:    Advanced COPD ([address]    Tobacco abuse    Dysphagia    Moderate protein-calorie malnutrition (HCC)    Dementia without behavioral disturbance  Resolved Problems:    * No resolved hospital problems.  *      Plan:     # Esophageal stricture  # Dysphagia    # severe GERD  #

## 2019-04-04 NOTE — PROGRESS NOTES
Shift assessment complete- see flow sheet. Nightly medications given see [facility]    Patient with no complaints of pain at this time. Patient denies any further needs. Bed alarm is set for patient safety.  Call light explained and in reach

## 2019-04-04 NOTE — DISCHARGE SUMMARY
Name:  Marcelina Mendezland  Room:  0018/0427-30  MRN:    [id number]    Discharge Summary      This discharge summary is in conjunction with a complete physical exam done on the day of discharge. Discharging Physician: Dr. Rosa Green: 4/1/2019  Discharge: 4/4/2019     HPI taken from admission H&P:    [address]. [address]. male with history of COPD, chronic respiratory failure on home oxygen 1.5-2.5 L , dementia, dysphagia , esophageal stricture, severe GERD. Status post EGD with stricture dilation last month. Previously has had an esophageal stent , which migrated and was subsequently removed last month . PEG tube placement was recommended for dysphagia, but patient refused. Patient now presenting with complaint of increased abdominal pain, difficulty swallowing, agreeable now for PEG tube placement. CT abdomen ER did not show any acute abnormalities. Patient has been able to take his medications crushed with pudding. Oral intake has declined. He has had long-term issues with weight loss  Patient complains of chronic pain from osteoarthritis, he says all his joints are hurting. He denies any shortness of breath now, he is on oxygen 2 L. Coughing up some clear sputum. No fevers. Chest x-ray did not show an acute infiltrates. Diagnoses this Admission and Hospital Course   # Esophageal stricture  # Dysphagia    # severe GERD  # abdominal pain  Seen By GI.    - > Previous history - Patient had an esophageal stent placed which had migrated and  the stent was removed, last month in March 2019. PEG placement considered, pt refused. Status post rpt  EGD  3/ 4 - > s/P esophageal dilatation . MBS  3/5 completed. started on dys 3 diet , and discharged on the same.  Currently back with recurrent dysphagia.  CT abdomen as above-an acute intra-abdominal pathology except for renal cysts and bladder calculi   continue  PPI  - >  S/P PEG tube placement 4/2. Start tube feed bolus. Tolerating well . DC IVF . DC home .       # Anemia  Monitor H&H     #  COPD, chronic hypoxic respiratory failure  -Continue supplemental oxygen at 2 L, cont  inhaled bronchodilators     # Osteoarthritis, chronic pain  - IV Dilaudid  PRN / PO oxycodone pRN for pain     #  Dementia   - Some cognitive deficits, however well-oriented.  Seen by psychiatry  last admission- >  Patient  well oriented , and deemed competent by psychiatry to make his own decisions.  Continue Razadyne        # UTI  - cont Rocephin   - F/U cultures--negative      PICC line placed in ED for poor access     Procedures (Please Review Full Report for Details)  PICC  PEG Tube     Consults    GI  Psychiatry       Physical Exam at Discharge:    /65   Pulse 118   Temp 98.8 °F (37.1 °C) (Axillary)   Resp 20   Ht 5' 5\" (1.651 m)   Wt 120 lb (54.4 kg)   SpO2 94%   BMI 19.97 kg/m²     Gen: No distress. Alert. Awake and oriented to place and person .  intermittent confusion   Is a little irritable now secondary to chronic pain from his arthritic joints .  On supplemental oxygen 2 L   Eyes: PERRL. No sclera icterus. No conjunctival injection. ENT: No discharge. Pharynx clear. Neck: Trachea midline. Normal thyroid. Resp: No accessory muscle use.  diminished breath sounds with bilateral mild rhonchi .   CV: Regular rate. Regular rhythm. No murmur or rub. No edema. GI: Non-tender. Non-distended. No masses. No organomegaly. Normal bowel sounds. No hernia. PEG +  Skin: Warm and dry. No nodule on exposed extremities. No rash on exposed extremities. Lymph: No cervical LAD. No supraclavicular LAD. M/S: No cyanosis. No joint deformity. No clubbing. Intact peripheral pulses. Brisk cap refill, < 2 secs  Neuro: Awake. Reflexes 2+ symmetric bilaterally   Psych: Oriented x 2. Some anxiety.       CBC:   Recent Labs     04/01/19  1505 04/02/19  0520 04/03/19  0520   WBC 7.3 6.0  --    HGB 10.6* 9.7* 8.0*   HCT 33.2* 30.5* 25.5*   MCV 86.7 88.1  --     182  --      BMP: ipratropium 0.02 % nebulizer solution  Commonly known as:  ATROVENT     LIPITOR 10 MG tablet  Generic drug:  atorvastatin     magnesium oxide 400 (241.3 Mg) MG Tabs tablet  Commonly known as:  MAG-OX  Take 1 tablet by mouth daily     nitroGLYCERIN 0.4 MG SL tablet  Commonly known as:  NITROSTAT     pantoprazole sodium 40 MG Pack packet  Commonly known as:  PROTONIX  Take 1 packet by mouth 2 times daily (before meals)     rivastigmine 4.6 MG/24HR  Commonly known as:  EXELON     sucralfate 1 GM/10ML suspension  Commonly known as:  CARAFATE     tamsulosin 0.4 MG capsule  Commonly known as:  FLOMAX           Where to Get Your Medications      You can get these medications from any pharmacy    Bring a paper prescription for each of these medications  · JEVITY 1 KENNEY/FIBER Liqd       Discharged in stable condition to home with Ginny 79    Follow Up:   Follow up with PCP in 1 week      Natasha Hernandes MD  4/4/19

## 2019-04-04 NOTE — PLAN OF CARE
Problem: Infection:  Goal: Will remain free from infection  Description  Will remain free from infection  Outcome: Met This Shift     Problem: Daily Care:  Goal: Daily care needs are met  Description  Daily care needs are met  Outcome: Met This Shift     Problem: Pain:  Goal: Control of acute pain  Description  Control of acute pain  Outcome: Met This Shift     Problem: Discharge Planning:  Goal: Patients continuum of care needs are met  Description  Patients continuum of care needs are met  Outcome: Met This Shift     Problem: Falls - Risk of:  Goal: Will remain free from falls  Description  Will remain free from falls  Outcome: Met This Shift

## 2019-04-04 NOTE — CARE COORDINATION
[facility]  Received referral regarding new Gastrostomy Tube feeds from [address] Pt going home today with [facility] for [facility]. needs. Telephone call to [facility] regarding Jevity order. Spoke with Onesimo Hughes in intake. [employer] delivers to [place], pt's zipcode. [employer] will cover Jevity and TF supplies at 100%. Informed Triny in follow up. [facility] Onesimo Hughes aware pt going home later today and [facility] SOC in am 4/5.     Electronically signed by Amando Calderón RN on 4/4/2019 at 2:10 PM

## 2019-04-09 ENCOUNTER — TELEPHONE (OUTPATIENT)
Dept: OTHER | Facility: CLINIC | Age: 69
End: 2019-04-09

## 2019-04-09 ENCOUNTER — APPOINTMENT (OUTPATIENT)
Dept: CT IMAGING | Age: 69
DRG: 254 | End: 2019-04-09
Payer: MEDICAID

## 2019-04-09 ENCOUNTER — HOSPITAL ENCOUNTER (INPATIENT)
Age: 69
LOS: 2 days | Discharge: HOME HEALTH | DRG: 254 | End: 2019-04-11
Attending: EMERGENCY MEDICINE | Admitting: INTERNAL MEDICINE
Payer: MEDICAID

## 2019-04-09 ENCOUNTER — APPOINTMENT (OUTPATIENT)
Dept: GENERAL RADIOLOGY | Age: 69
DRG: 254 | End: 2019-04-09
Payer: MEDICAID

## 2019-04-09 DIAGNOSIS — K92.2 UPPER GI BLEED: Primary | ICD-10-CM

## 2019-04-09 LAB
A/G RATIO: 1.1 (ref 1.1–2.2)
ABO/RH: NORMAL
ALBUMIN SERPL-MCNC: 3.4 G/DL (ref 3.4–5)
ALP BLD-CCNC: 58 U/L (ref 40–129)
ALT SERPL-CCNC: 14 U/L (ref 10–40)
ANION GAP SERPL CALCULATED.3IONS-SCNC: 8 MMOL/L (ref 3–16)
ANTIBODY SCREEN: NORMAL
APTT: 28.6 SEC (ref 26–36)
AST SERPL-CCNC: 14 U/L (ref 15–37)
BASOPHILS ABSOLUTE: 0.1 K/UL (ref 0–0.2)
BASOPHILS RELATIVE PERCENT: 0.5 %
BILIRUB SERPL-MCNC: 0.3 MG/DL (ref 0–1)
BILIRUBIN URINE: NEGATIVE
BLOOD, URINE: ABNORMAL
BUN BLDV-MCNC: 19 MG/DL (ref 7–20)
CALCIUM SERPL-MCNC: 9.3 MG/DL (ref 8.3–10.6)
CHLORIDE BLD-SCNC: 92 MMOL/L (ref 99–110)
CLARITY: ABNORMAL
CO2: 39 MMOL/L (ref 21–32)
COLOR: YELLOW
CREAT SERPL-MCNC: 0.8 MG/DL (ref 0.8–1.3)
EKG ATRIAL RATE: 108 BPM
EKG DIAGNOSIS: NORMAL
EKG P AXIS: 79 DEGREES
EKG P-R INTERVAL: 128 MS
EKG Q-T INTERVAL: 330 MS
EKG QRS DURATION: 82 MS
EKG QTC CALCULATION (BAZETT): 442 MS
EKG R AXIS: 79 DEGREES
EKG T AXIS: 76 DEGREES
EKG VENTRICULAR RATE: 108 BPM
EOSINOPHILS ABSOLUTE: 0.4 K/UL (ref 0–0.6)
EOSINOPHILS RELATIVE PERCENT: 3.9 %
EPITHELIAL CELLS, UA: ABNORMAL /HPF
GFR AFRICAN AMERICAN: >60
GFR NON-AFRICAN AMERICAN: >60
GLOBULIN: 3 G/DL
GLUCOSE BLD-MCNC: 129 MG/DL (ref 70–99)
GLUCOSE URINE: NEGATIVE MG/DL
HCT VFR BLD CALC: 36 % (ref 40.5–52.5)
HEMOGLOBIN: 11.6 G/DL (ref 13.5–17.5)
INR BLD: 1.11 (ref 0.86–1.14)
KETONES, URINE: NEGATIVE MG/DL
LACTIC ACID, SEPSIS: 1.2 MMOL/L (ref 0.4–1.9)
LEUKOCYTE ESTERASE, URINE: NEGATIVE
LYMPHOCYTES ABSOLUTE: 1.1 K/UL (ref 1–5.1)
LYMPHOCYTES RELATIVE PERCENT: 9.3 %
MCH RBC QN AUTO: 28.1 PG (ref 26–34)
MCHC RBC AUTO-ENTMCNC: 32.2 G/DL (ref 31–36)
MCV RBC AUTO: 87.1 FL (ref 80–100)
MICROSCOPIC EXAMINATION: YES
MONOCYTES ABSOLUTE: 1.4 K/UL (ref 0–1.3)
MONOCYTES RELATIVE PERCENT: 12.7 %
NEUTROPHILS ABSOLUTE: 8.4 K/UL (ref 1.7–7.7)
NEUTROPHILS RELATIVE PERCENT: 73.6 %
NITRITE, URINE: NEGATIVE
OCCULT BLOOD DIAGNOSTIC: ABNORMAL
PDW BLD-RTO: 17.1 % (ref 12.4–15.4)
PH UA: 7 (ref 5–8)
PLATELET # BLD: 354 K/UL (ref 135–450)
PMV BLD AUTO: 9 FL (ref 5–10.5)
POTASSIUM REFLEX MAGNESIUM: 3.9 MMOL/L (ref 3.5–5.1)
PRO-BNP: 643 PG/ML (ref 0–124)
PROTEIN UA: NEGATIVE MG/DL
PROTHROMBIN TIME: 12.6 SEC (ref 9.8–13)
RBC # BLD: 4.14 M/UL (ref 4.2–5.9)
RBC UA: ABNORMAL /HPF (ref 0–2)
SODIUM BLD-SCNC: 139 MMOL/L (ref 136–145)
SPECIFIC GRAVITY UA: <=1.005 (ref 1–1.03)
TOTAL PROTEIN: 6.4 G/DL (ref 6.4–8.2)
TROPONIN: <0.01 NG/ML
URINE REFLEX TO CULTURE: ABNORMAL
URINE TYPE: ABNORMAL
UROBILINOGEN, URINE: 0.2 E.U./DL
WBC # BLD: 11.4 K/UL (ref 4–11)
WBC UA: ABNORMAL /HPF (ref 0–5)

## 2019-04-09 PROCEDURE — 6360000004 HC RX CONTRAST MEDICATION: Performed by: EMERGENCY MEDICINE

## 2019-04-09 PROCEDURE — 2580000003 HC RX 258: Performed by: INTERNAL MEDICINE

## 2019-04-09 PROCEDURE — 81001 URINALYSIS AUTO W/SCOPE: CPT

## 2019-04-09 PROCEDURE — 96365 THER/PROPH/DIAG IV INF INIT: CPT

## 2019-04-09 PROCEDURE — 6360000002 HC RX W HCPCS: Performed by: PHYSICIAN ASSISTANT

## 2019-04-09 PROCEDURE — 86901 BLOOD TYPING SEROLOGIC RH(D): CPT

## 2019-04-09 PROCEDURE — 86900 BLOOD TYPING SEROLOGIC ABO: CPT

## 2019-04-09 PROCEDURE — 80053 COMPREHEN METABOLIC PANEL: CPT

## 2019-04-09 PROCEDURE — G0328 FECAL BLOOD SCRN IMMUNOASSAY: HCPCS

## 2019-04-09 PROCEDURE — 85025 COMPLETE CBC W/AUTO DIFF WBC: CPT

## 2019-04-09 PROCEDURE — 6370000000 HC RX 637 (ALT 250 FOR IP): Performed by: INTERNAL MEDICINE

## 2019-04-09 PROCEDURE — 93010 ELECTROCARDIOGRAM REPORT: CPT | Performed by: INTERNAL MEDICINE

## 2019-04-09 PROCEDURE — 2700000000 HC OXYGEN THERAPY PER DAY

## 2019-04-09 PROCEDURE — 74177 CT ABD & PELVIS W/CONTRAST: CPT

## 2019-04-09 PROCEDURE — 85730 THROMBOPLASTIN TIME PARTIAL: CPT

## 2019-04-09 PROCEDURE — 94640 AIRWAY INHALATION TREATMENT: CPT

## 2019-04-09 PROCEDURE — 93005 ELECTROCARDIOGRAM TRACING: CPT | Performed by: PHYSICIAN ASSISTANT

## 2019-04-09 PROCEDURE — 1200000000 HC SEMI PRIVATE

## 2019-04-09 PROCEDURE — 84484 ASSAY OF TROPONIN QUANT: CPT

## 2019-04-09 PROCEDURE — 87040 BLOOD CULTURE FOR BACTERIA: CPT

## 2019-04-09 PROCEDURE — C9113 INJ PANTOPRAZOLE SODIUM, VIA: HCPCS | Performed by: PHYSICIAN ASSISTANT

## 2019-04-09 PROCEDURE — 94761 N-INVAS EAR/PLS OXIMETRY MLT: CPT

## 2019-04-09 PROCEDURE — 86850 RBC ANTIBODY SCREEN: CPT

## 2019-04-09 PROCEDURE — 36415 COLL VENOUS BLD VENIPUNCTURE: CPT

## 2019-04-09 PROCEDURE — 85610 PROTHROMBIN TIME: CPT

## 2019-04-09 PROCEDURE — 87086 URINE CULTURE/COLONY COUNT: CPT

## 2019-04-09 PROCEDURE — 71045 X-RAY EXAM CHEST 1 VIEW: CPT

## 2019-04-09 PROCEDURE — 83880 ASSAY OF NATRIURETIC PEPTIDE: CPT

## 2019-04-09 PROCEDURE — 83605 ASSAY OF LACTIC ACID: CPT

## 2019-04-09 PROCEDURE — 2580000003 HC RX 258: Performed by: PHYSICIAN ASSISTANT

## 2019-04-09 PROCEDURE — 99285 EMERGENCY DEPT VISIT HI MDM: CPT

## 2019-04-09 PROCEDURE — 96366 THER/PROPH/DIAG IV INF ADDON: CPT

## 2019-04-09 RX ORDER — ACETAMINOPHEN 325 MG/1
650 TABLET ORAL EVERY 4 HOURS PRN
Status: DISCONTINUED | OUTPATIENT
Start: 2019-04-09 | End: 2019-04-11 | Stop reason: HOSPADM

## 2019-04-09 RX ORDER — 0.9 % SODIUM CHLORIDE 0.9 %
1000 INTRAVENOUS SOLUTION INTRAVENOUS ONCE
Status: COMPLETED | OUTPATIENT
Start: 2019-04-09 | End: 2019-04-09

## 2019-04-09 RX ORDER — ATORVASTATIN CALCIUM 10 MG/1
10 TABLET, FILM COATED ORAL NIGHTLY
Status: DISCONTINUED | OUTPATIENT
Start: 2019-04-09 | End: 2019-04-11 | Stop reason: HOSPADM

## 2019-04-09 RX ORDER — SUCRALFATE 1 G/1
1 TABLET ORAL EVERY 6 HOURS SCHEDULED
Status: DISCONTINUED | OUTPATIENT
Start: 2019-04-09 | End: 2019-04-11 | Stop reason: HOSPADM

## 2019-04-09 RX ORDER — RIVASTIGMINE 4.6 MG/24H
1 PATCH, EXTENDED RELEASE TRANSDERMAL DAILY
Status: DISCONTINUED | OUTPATIENT
Start: 2019-04-10 | End: 2019-04-11 | Stop reason: HOSPADM

## 2019-04-09 RX ORDER — DILTIAZEM HYDROCHLORIDE 240 MG/1
240 CAPSULE, COATED, EXTENDED RELEASE ORAL DAILY
Status: DISCONTINUED | OUTPATIENT
Start: 2019-04-10 | End: 2019-04-11 | Stop reason: HOSPADM

## 2019-04-09 RX ORDER — CITALOPRAM 20 MG/1
10 TABLET ORAL DAILY
Status: DISCONTINUED | OUTPATIENT
Start: 2019-04-10 | End: 2019-04-11 | Stop reason: HOSPADM

## 2019-04-09 RX ORDER — TAMSULOSIN HYDROCHLORIDE 0.4 MG/1
0.4 CAPSULE ORAL DAILY
Status: DISCONTINUED | OUTPATIENT
Start: 2019-04-09 | End: 2019-04-11 | Stop reason: HOSPADM

## 2019-04-09 RX ORDER — GABAPENTIN 400 MG/1
400 CAPSULE ORAL 3 TIMES DAILY
Status: DISCONTINUED | OUTPATIENT
Start: 2019-04-10 | End: 2019-04-11 | Stop reason: HOSPADM

## 2019-04-09 RX ORDER — SODIUM CHLORIDE 0.9 % (FLUSH) 0.9 %
10 SYRINGE (ML) INJECTION PRN
Status: DISCONTINUED | OUTPATIENT
Start: 2019-04-09 | End: 2019-04-11 | Stop reason: HOSPADM

## 2019-04-09 RX ORDER — SODIUM CHLORIDE 9 MG/ML
INJECTION, SOLUTION INTRAVENOUS CONTINUOUS
Status: DISCONTINUED | OUTPATIENT
Start: 2019-04-09 | End: 2019-04-11

## 2019-04-09 RX ORDER — ALBUTEROL SULFATE 90 UG/1
2 AEROSOL, METERED RESPIRATORY (INHALATION) EVERY 6 HOURS PRN
Status: DISCONTINUED | OUTPATIENT
Start: 2019-04-09 | End: 2019-04-11 | Stop reason: HOSPADM

## 2019-04-09 RX ORDER — ONDANSETRON 2 MG/ML
4 INJECTION INTRAMUSCULAR; INTRAVENOUS EVERY 6 HOURS PRN
Status: DISCONTINUED | OUTPATIENT
Start: 2019-04-09 | End: 2019-04-11 | Stop reason: HOSPADM

## 2019-04-09 RX ORDER — SODIUM CHLORIDE 0.9 % (FLUSH) 0.9 %
10 SYRINGE (ML) INJECTION EVERY 12 HOURS SCHEDULED
Status: DISCONTINUED | OUTPATIENT
Start: 2019-04-09 | End: 2019-04-11 | Stop reason: HOSPADM

## 2019-04-09 RX ORDER — NITROGLYCERIN 0.4 MG/1
0.4 TABLET SUBLINGUAL EVERY 5 MIN PRN
Status: DISCONTINUED | OUTPATIENT
Start: 2019-04-09 | End: 2019-04-11 | Stop reason: HOSPADM

## 2019-04-09 RX ADMIN — SODIUM CHLORIDE: 9 INJECTION, SOLUTION INTRAVENOUS at 23:17

## 2019-04-09 RX ADMIN — PANTOPRAZOLE SODIUM 8 MG/HR: 40 INJECTION, POWDER, FOR SOLUTION INTRAVENOUS at 14:48

## 2019-04-09 RX ADMIN — Medication 10 ML: at 23:19

## 2019-04-09 RX ADMIN — SODIUM CHLORIDE 1000 ML: 9 INJECTION, SOLUTION INTRAVENOUS at 14:16

## 2019-04-09 RX ADMIN — IOPAMIDOL 75 ML: 755 INJECTION, SOLUTION INTRAVENOUS at 15:23

## 2019-04-09 RX ADMIN — PANTOPRAZOLE SODIUM 80 MG: 40 INJECTION, POWDER, FOR SOLUTION INTRAVENOUS at 14:16

## 2019-04-09 RX ADMIN — Medication 2 PUFF: at 22:43

## 2019-04-09 ASSESSMENT — ENCOUNTER SYMPTOMS
COLOR CHANGE: 0
COUGH: 0
RESPIRATORY NEGATIVE: 1
NAUSEA: 0
CONSTIPATION: 0
ABDOMINAL PAIN: 0
DIARRHEA: 0
SHORTNESS OF BREATH: 0
VOMITING: 0

## 2019-04-09 ASSESSMENT — PAIN SCALES - GENERAL: PAINLEVEL_OUTOF10: 7

## 2019-04-09 ASSESSMENT — PAIN DESCRIPTION - PAIN TYPE: TYPE: ACUTE PAIN

## 2019-04-09 ASSESSMENT — PAIN DESCRIPTION - LOCATION: LOCATION: BACK

## 2019-04-09 NOTE — ED NOTES
Attempt to straight cath unsuccessful, patient states he has enlarged prostate.   Urinal provided for urine sample     Collin Forbes RN  04/09/19 7972

## 2019-04-09 NOTE — ED PROVIDER NOTES
[address] ED  eMERGENCY dEPARTMENT eNCOUnter        Pt Name: Michelet Miller  MRN: [id number]  [place] 1950  Date of evaluation: 4/9/2019  Provider: NIK Quinonez  PCP: SIMONA Salgado - CNP    This patient was seen and evaluated by the attending physician Beny Marsh, [address] [facility]       Chief Complaint   Patient presents with    GI Bleeding     Pt states he was told to come to ER for possible gi bleed admitted at [facility] last week given 2 units blood        HISTORY OF PRESENT ILLNESS   (Location/Symptom, Timing/Onset, Context/Setting, Quality, Duration, Modifying Factors, Severity)  Note limiting factors. Michelet Miller is a 80 y.o. male with past medical history of COPD, dementia, emphysema and tobacco use disorder who presents to the ED with complaint of possible GI bleed. Patient was told to come to the ED for possible upper GI bleed. Apparently was admitted at [facility] and given 2 units of blood transfusion. States did not have scope. Apparently was on blood thinner per patient and family but was taken off due to upper GI bleed. Family unsure of what blood thinner he was on. States weakness and fatigue for the past couple of days consistent with when he had the GI bleed in the past.  So PCP who recommended he come to the ED for further evaluation and treatment. Has had some black \"muddy\" stool yesterday and today. Denies bright red blood per rectum. Denies abdominal pain, chest pain, shortness of breath, fever/chills, rashes/lesions, urinary symptoms, nausea/vomiting at this time. Nursing Notes were all reviewed and agreed with or any disagreements were addressed  in the HPI. REVIEW OF SYSTEMS    (2-9 systems for level 4, 10 or more for level 5)     Review of Systems   Constitutional: Positive for fatigue. Negative for activity change, appetite change, chills and fever. Respiratory: Negative.   Negative for cough and shortness of breath. Cardiovascular: Negative. Negative for chest pain. Gastrointestinal: Negative for abdominal pain, constipation, diarrhea, nausea and vomiting. Genitourinary: Negative for difficulty urinating and dysuria. Musculoskeletal: Negative for arthralgias, myalgias, neck pain and neck stiffness. Skin: Negative for color change, pallor, rash and wound. Neurological: Positive for weakness. Negative for dizziness, light-headedness and headaches. Positives and Pertinent negatives as per HPI. Except as noted abovein the ROS, all other systems were reviewed and negative. PAST MEDICAL HISTORY     Past Medical History:   Diagnosis Date    [facility]     Dementia     Emphysema of lung [facility]     Esophageal stricture     Nondependent tobacco use disorder          SURGICAL HISTORY     Past Surgical History:   Procedure Laterality Date    UPPER GASTROINTESTINAL ENDOSCOPY N/A 12/12/2018    EGD DILATION BALLOON performed by Carol Neves MD at [address] [place] [address] N/A 12/13/2018    EGD DILATION BALLOON performed by Carol Neves MD at [address] [place] [address]  12/23/2018    foreign body removal, balloon dilation    UPPER GASTROINTESTINAL ENDOSCOPY N/A 12/23/2018    EGD FOREIGN BODY REMOVAL performed by Carol Neves MD at [address] [place] [address]  12/23/2018    EGD DILATION BALLOON performed by Carol Neves MD at [address] [place] [address] 1/30/2019    EGD W/20X8 STENT PLACEMENT W/ANES.  (9:30) performed by Carol Neves MD at [address] [place] [address]  1/30/2019    EGD DILATION BALLOON performed by Carol Neves MD at [address] [place] [address]  1/30/2019    EGD BIOPSY performed by Jazmine Garay MD at [facility] ENDOSCOPY  03/01/2019    Esophageal stent removed    UPPER GASTROINTESTINAL ENDOSCOPY N/A 3/1/2019    EGD DILATION BALLOON performed by Jazmine Garay MD at [place] [address]  3/1/2019    EGD STENT REMOVAL performed by Jazmine Garay MD at [place] [address] N/A 3/4/2019    EGD BIOPSY performed by Meaghan Mcduffie DO at [place] [address]  3/4/2019    EGD SUBMUCOSAL/BOTOX INJECTION performed by Meaghan Mcduffie DO at [place] [address]  3/4/2019    EGD DILATION BALLOON performed by Meaghan Mcduffie DO at [place] [address]  04/02/2019    EGD with PEG placement         CURRENTMEDICATIONS       Previous Medications    ALBUTEROL SULFATE  (90 BASE) MCG/ACT INHALER    Inhale 2 puffs into the lungs every 6 hours as needed for Wheezing    ASPIRIN 81 MG TABLET    Take 81 mg by mouth daily    ATORVASTATIN (LIPITOR) 10 MG TABLET    Take 10 mg by mouth nightly    CITALOPRAM (CELEXA) 10 MG TABLET    Take 10 mg by mouth daily    DILTIAZEM (DILACOR XR) 240 MG EXTENDED RELEASE CAPSULE    Take 240 mg by mouth daily    DOCUSATE (COLACE) 50 MG/5ML LIQUID    Take 100 mg by mouth 2 times daily    GABAPENTIN (NEURONTIN) 400 MG CAPSULE    Take 400 mg by mouth 3 times daily. .    IPRATROPIUM (ATROVENT) 0.02 % NEBULIZER SOLUTION    Take 1.25 mg by nebulization every 4 hours as needed for Wheezing    MAGNESIUM OXIDE (MAG-OX) 400 (241.3 MG) MG TABS TABLET    Take 1 tablet by mouth daily    NITROGLYCERIN (NITROSTAT) 0.4 MG SL TABLET    Place 0.4 mg under the tongue every 5 minutes as needed for Chest pain up to max of 3 total doses. If no relief after 1 dose, call 911.     NUTRITIONAL SUPPLEMENTS (JEVITY 1 KENNEY/FIBER) LIQD    1 Can by Gastrostomy Tube route 4 times daily    PANTOPRAZOLE SODIUM (PROTONIX) 40 MG PACK PACKET    Take 1 packet by mouth 2 times daily (before meals)    RIVASTIGMINE (EXELON) 4.6 MG/24HR    Place 1 patch onto the skin daily    SUCRALFATE (CARAFATE) 1 GM/10ML SUSPENSION    Take 1 g by mouth 4 times daily    TAMSULOSIN (FLOMAX) 0.4 MG CAPSULE    Take 0.4 mg by mouth daily    UMECLIDINIUM-VILANTEROL (ANORO ELLIPTA) 62.5-25 MCG/INH AEPB INHALER    Inhale 1 puff into the lungs daily         ALLERGIES     Heparin    FAMILYHISTORY       Family History   Problem Relation Age of Onset    Heart Disease Mother     Diabetes Mother     Cancer Father           SOCIAL HISTORY       Social History     Socioeconomic History    Marital status: Single     Spouse name: None    Number of children: None    Years of education: None    Highest education level: None   Occupational History    None   Social Needs    Financial resource strain: None    Food insecurity:     Worry: None     Inability: None    Transportation needs:     Medical: None     Non-medical: None   Tobacco Use    Smoking status: Current Every Day Smoker     Packs/day: 1.00     Years: 50.00     Pack years: 50.00     Types: Cigarettes    Smokeless tobacco: Never Used   Substance and Sexual Activity    Alcohol use: No    Drug use: No    Sexual activity: Not Currently   Lifestyle    Physical activity:     Days per week: None     Minutes per session: None    Stress: None   Relationships    Social connections:     Talks on phone: None     Gets together: None     Attends Worship service: None     Active member of club or organization: None     Attends meetings of clubs or organizations: None     Relationship status: None    Intimate partner violence:     Fear of current or ex partner: None     Emotionally abused: None     Physically abused: None     Forced sexual activity: None   Other Topics Concern    None   Social History Narrative    None       SCREENINGS Marilynn Coma Scale  Eye Opening: Spontaneous  Best Verbal Response: Oriented  Best Motor Response: Obeys commands  Marilynn Coma Scale Score: 15        PHYSICAL EXAM    (up to 7 for level 4, 8 or more for level 5)     ED Triage Vitals [04/09/19 1332]   BP Temp Temp Source Pulse Resp SpO2 Height Weight   92/61 96.6 °F (35.9 °C) Oral 113 15 94 % -- --       Physical Exam   Constitutional: He is oriented to person, place, and time. He appears well-developed and well-nourished. HENT:   Head: Normocephalic and atraumatic. Right Ear: External ear normal.   Left Ear: External ear normal.   Eyes: Right eye exhibits no discharge. Left eye exhibits no discharge. Neck: Normal range of motion. Neck supple. Cardiovascular: Regular rhythm, normal heart sounds and intact distal pulses. Exam reveals no gallop and no friction rub. No murmur heard. Tachycardia noted. 2+ radial pulses bilaterally. No pedal edema. No calf tenderness. No JVD. Pulmonary/Chest: Effort normal and breath sounds normal. No stridor. No respiratory distress. He has no wheezes. He has no rales. He exhibits no tenderness. Abdominal: Soft. Bowel sounds are normal. He exhibits no distension and no mass. There is no tenderness. There is no rigidity, no rebound, no guarding, no CVA tenderness, no tenderness at McBurney's point and negative Lares's sign. Genitourinary: Rectum normal. Rectal exam shows no external hemorrhoid, no internal hemorrhoid, no fissure, no mass, no tenderness and anal tone normal.   Genitourinary Comments: Upon examination patient has brown stool noted per rectum without bright red blood. No fissure or fistula noted. No tenderness. No mass. Musculoskeletal: Normal range of motion. Redness to the sacral area with some slight skin breakdown noted and bandage over the area consistent with early bedsore. Neurological: He is alert and oriented to person, place, and time. Skin: Skin is warm and dry. No rash noted.  He 13:45  ANTIBIOTICS AT SUELLEN.:                      RECEIVED :  04/09/19 14:20  Performed at:  [facility]) [facility],  [place], [place]   Phone [phone]   MICROSCOPIC URINALYSIS - Abnormal; Notable for the following components:    RBC, UA 20-50 (*)     All other components within normal limits    Narrative:     Performed at:  [facility],  [place], [place]   Phone [phone]   CULTURE BLOOD #1   CULTURE BLOOD #2   PROTIME-INR    Narrative:     Performed at:  [place] [address],  [place], [place]   Phone [phone]   APTT    Narrative:     Performed at:  [facility],  [place], [place]   Phone [phone]   LACTATE, SEPSIS    Narrative:     Performed at:  [place] [address],  [place], [place]   Phone [phone]   TROPONIN    Narrative:     Performed at:  [facility]) [facility] acute [facility],  [place], [place]   Phone [phone]   LACTATE, SEPSIS   TYPE AND SCREEN    Narrative:     Performed at:  [facility],  [place], [place]   Phone [phone]       All other labs were within normal range or not returned as of this dictation. EKG: All EKG's are interpreted by the Emergency Department Physician who either signs orCo-signs this chart in the absence of a cardiologist.  Please see their note for interpretation of EKG.       RADIOLOGY:   Non-plain film images such as CT, Ultrasound and MRI are read by the radiologist. Plain radiographic images are visualized andpreliminarily interpreted by the  ED Provider with the below findings:        Interpretation perthe Radiologist below, if available at the time of this note:    CT ABDOMEN PELVIS W IV CONTRAST Additional Contrast? but otherwise unremarkable. Coags obtained. Blood type obtained. Upon and normal.  EKG interpreted by attending. . Chest x-ray showed COPD findings without acute abnormality. CT of the abdomen and pelvis with IV contrast showed questionable esophagitis and known infrarenal abdominal aortic aneurysm. Given history and physical examination suffering clinically what appears to be an upper GI bleed. Hemodynamically stable given improvement of blood pressure with fluids here in the ED. Hemoglobin normal.  Did discuss with GI physician, Dr. Chuy Contreras, who recommended n.p.o. status and admission to hospitalist service for scope in the morning. Case discussed with hospitalist team who agreed to admit the patient for further evaluation and treatment. FINAL IMPRESSION      1. Upper GI bleed          DISPOSITION/PLAN   DISPOSITION Decision To Admit 04/09/2019 05:47:46 PM      PATIENT REFERREDTO:  No follow-up provider specified.     DISCHARGE MEDICATIONS:  New Prescriptions    No medications on file       DISCONTINUED MEDICATIONS:  Discontinued Medications    No medications on file              (Please note that portions ofthis note were completed with a voice recognition program.  Efforts were made to edit the dictations but occasionally words are mis-transcribed.)    NIK Grimes (electronically signed)          NIK Layne  04/09/19 4584

## 2019-04-09 NOTE — ED NOTES
Pt requesting food at this time. Okayed by Addison Gordon, [address], to give pt food. Will continue to monitor.       Scarlett Olguin RN  04/09/19 5385

## 2019-04-09 NOTE — ED NOTES
Call placed to GI @ 7530, Dr Meme Yang is on call today     Amor Patient  04/09/19 1613    Called for second page @ [facility]  04/09/19 1643    3rd Page for GI sent @ [address] [facility]  04/09/19 1722    4th page sent to GI @ [phone] - answering service unsure why no call back      Amor Patient  04/09/19 [phone]

## 2019-04-09 NOTE — ED NOTES
Report given to [facility] on 2[facility] at this time, Transport to take pt to unit via [address], RN  04/09/19 7092

## 2019-04-09 NOTE — ED NOTES
Pt transported to  at this time with transport with protonix infusing and pt on 2 L NC.       Chandana Humphreys, TABITHA  04/09/19 3383

## 2019-04-10 PROBLEM — E43 SEVERE PROTEIN-CALORIE MALNUTRITION (HCC): Status: ACTIVE | Noted: 2019-04-10

## 2019-04-10 LAB
ANION GAP SERPL CALCULATED.3IONS-SCNC: 6 MMOL/L (ref 3–16)
BUN BLDV-MCNC: 12 MG/DL (ref 7–20)
CALCIUM SERPL-MCNC: 8.4 MG/DL (ref 8.3–10.6)
CHLORIDE BLD-SCNC: 98 MMOL/L (ref 99–110)
CO2: 33 MMOL/L (ref 21–32)
CREAT SERPL-MCNC: <0.5 MG/DL (ref 0.8–1.3)
GFR AFRICAN AMERICAN: >60
GFR NON-AFRICAN AMERICAN: >60
GLUCOSE BLD-MCNC: 95 MG/DL (ref 70–99)
HCT VFR BLD CALC: 28.2 % (ref 40.5–52.5)
HCT VFR BLD CALC: 30.2 % (ref 40.5–52.5)
HEMOGLOBIN: 9 G/DL (ref 13.5–17.5)
HEMOGLOBIN: 9.6 G/DL (ref 13.5–17.5)
POTASSIUM REFLEX MAGNESIUM: 3.7 MMOL/L (ref 3.5–5.1)
SODIUM BLD-SCNC: 137 MMOL/L (ref 136–145)

## 2019-04-10 PROCEDURE — 2709999900 HC NON-CHARGEABLE SUPPLY: Performed by: INTERNAL MEDICINE

## 2019-04-10 PROCEDURE — 3609012500 HC EGD DILATION BALLOON: Performed by: INTERNAL MEDICINE

## 2019-04-10 PROCEDURE — 2580000003 HC RX 258: Performed by: INTERNAL MEDICINE

## 2019-04-10 PROCEDURE — 94761 N-INVAS EAR/PLS OXIMETRY MLT: CPT

## 2019-04-10 PROCEDURE — 1200000000 HC SEMI PRIVATE

## 2019-04-10 PROCEDURE — 6360000002 HC RX W HCPCS: Performed by: INTERNAL MEDICINE

## 2019-04-10 PROCEDURE — 6370000000 HC RX 637 (ALT 250 FOR IP): Performed by: INTERNAL MEDICINE

## 2019-04-10 PROCEDURE — 0D738ZZ DILATION OF LOWER ESOPHAGUS, VIA NATURAL OR ARTIFICIAL OPENING ENDOSCOPIC: ICD-10-PCS | Performed by: INTERNAL MEDICINE

## 2019-04-10 PROCEDURE — 80048 BASIC METABOLIC PNL TOTAL CA: CPT

## 2019-04-10 PROCEDURE — 99152 MOD SED SAME PHYS/QHP 5/>YRS: CPT | Performed by: INTERNAL MEDICINE

## 2019-04-10 PROCEDURE — 85018 HEMOGLOBIN: CPT

## 2019-04-10 PROCEDURE — 36415 COLL VENOUS BLD VENIPUNCTURE: CPT

## 2019-04-10 PROCEDURE — C1726 CATH, BAL DIL, NON-VASCULAR: HCPCS | Performed by: INTERNAL MEDICINE

## 2019-04-10 PROCEDURE — C9113 INJ PANTOPRAZOLE SODIUM, VIA: HCPCS | Performed by: INTERNAL MEDICINE

## 2019-04-10 PROCEDURE — 7100000010 HC PHASE II RECOVERY - FIRST 15 MIN: Performed by: INTERNAL MEDICINE

## 2019-04-10 PROCEDURE — 2700000000 HC OXYGEN THERAPY PER DAY

## 2019-04-10 PROCEDURE — 85014 HEMATOCRIT: CPT

## 2019-04-10 PROCEDURE — 7100000011 HC PHASE II RECOVERY - ADDTL 15 MIN: Performed by: INTERNAL MEDICINE

## 2019-04-10 PROCEDURE — 99232 SBSQ HOSP IP/OBS MODERATE 35: CPT | Performed by: INTERNAL MEDICINE

## 2019-04-10 RX ORDER — FENTANYL CITRATE 50 UG/ML
INJECTION, SOLUTION INTRAMUSCULAR; INTRAVENOUS PRN
Status: DISCONTINUED | OUTPATIENT
Start: 2019-04-10 | End: 2019-04-10 | Stop reason: ALTCHOICE

## 2019-04-10 RX ORDER — MIDAZOLAM HYDROCHLORIDE 5 MG/ML
INJECTION INTRAMUSCULAR; INTRAVENOUS PRN
Status: DISCONTINUED | OUTPATIENT
Start: 2019-04-10 | End: 2019-04-10 | Stop reason: ALTCHOICE

## 2019-04-10 RX ADMIN — PANTOPRAZOLE SODIUM 8 MG/HR: 40 INJECTION, POWDER, FOR SOLUTION INTRAVENOUS at 12:52

## 2019-04-10 RX ADMIN — SODIUM CHLORIDE: 9 INJECTION, SOLUTION INTRAVENOUS at 07:34

## 2019-04-10 RX ADMIN — SODIUM CHLORIDE: 9 INJECTION, SOLUTION INTRAVENOUS at 18:13

## 2019-04-10 RX ADMIN — PANTOPRAZOLE SODIUM 8 MG/HR: 40 INJECTION, POWDER, FOR SOLUTION INTRAVENOUS at 02:09

## 2019-04-10 RX ADMIN — Medication 10 ML: at 09:48

## 2019-04-10 RX ADMIN — DILTIAZEM HYDROCHLORIDE 240 MG: 240 CAPSULE, COATED, EXTENDED RELEASE ORAL at 20:51

## 2019-04-10 RX ADMIN — CITALOPRAM HYDROBROMIDE 10 MG: 20 TABLET ORAL at 20:51

## 2019-04-10 RX ADMIN — Medication 10 ML: at 20:56

## 2019-04-10 ASSESSMENT — PAIN SCALES - GENERAL: PAINLEVEL_OUTOF10: 0

## 2019-04-10 ASSESSMENT — PAIN - FUNCTIONAL ASSESSMENT: PAIN_FUNCTIONAL_ASSESSMENT: 0-10

## 2019-04-10 NOTE — PROGRESS NOTES
Pt awake & resting in bed. Shift assessment completed except for coccyx area which pt is currently refusing to turn for nurse. Pt NPO for EGD later today.

## 2019-04-10 NOTE — PLAN OF CARE
Nutrition Problem: Inadequate oral intake  Intervention: Food and/or Nutrient Delivery: Continue NPO, Start Tube Feeding  Nutritional Goals: pt will have 100% of his nutritional needs met per G tibe and PO inatkes and his weight will be 112# or >

## 2019-04-10 NOTE — H&P
Hospital Medicine History & Physical      PCP: Brandi Luz Mariakeke, APRN - CNP    Date of Admission: 4/9/2019    Date of Service: Pt seen/examined on 4/9/2019 and Admitted to Inpatient with expected LOS greater than two midnights due to medical therapy. Chief Complaint:   GI bleed      History Of Present Illness:       80 y.o. male discharged from [facility] 4/4/2019 following treatment for esophageal stricture, severe GERD with dliation last month. Presented to [facility] with guiac positive stools and a decreased hgb, and transferred to [facility]. Currently, patient is on O2 per baseline requirement of 2-2.5 lpm, denies chest or abdominal pain, in no respiratory distress. Past Medical History:          Diagnosis Date    [facility]ch[facility] obstructive [facility] disease) ([facility]     Dementia     Emphysema of lung ([facility]     Esophageal stricture     Nondependent tobacco use disorder        Past Surgical History:          Procedure Laterality Date    UPPER GASTROINTESTINAL ENDOSCOPY N/A 12/12/2018    EGD DILATION BALLOON performed by Deion Bustamante MD at [place] [address] N/A 12/13/2018    EGD DILATION BALLOON performed by Deion Bustamante MD at [place] [address]  12/23/2018    foreign body removal, balloon dilation    UPPER GASTROINTESTINAL ENDOSCOPY N/A 12/23/2018    EGD FOREIGN BODY REMOVAL performed by Deion Bustamante MD at [place] [address]  12/23/2018    EGD DILATION BALLOON performed by Deion Bustamante MD at [place] [address] 1/30/2019    EGD W/20X8 STENT PLACEMENT W/ANES.  (9:30) performed by Deion Bustamante MD at [place] [address]  1/30/2019    EGD DILATION BALLOON performed by Deion Bustamante MD at [facility] ENDOSCOPY  1/30/2019    EGD BIOPSY performed by Deion Bustamante MD at [facility] ENDOSCOPY  03/01/2019    Esophageal stent removed    UPPER GASTROINTESTINAL ENDOSCOPY N/A 3/1/2019    EGD DILATION BALLOON performed by Deion Bustamante MD at [address] [place] [address]  3/1/2019    EGD STENT REMOVAL performed by Deion Bustamante MD at [address] [place] [address] N/A 3/4/2019    EGD BIOPSY performed by Tio Oneill DO at [address] [place] [address]  3/4/2019    EGD SUBMUCOSAL/BOTOX INJECTION performed by Tio Oneill DO at [address] [place] [address]  3/4/2019    EGD DILATION BALLOON performed by Tio Oneill DO at [address] [place] [address]  04/02/2019    EGD with PEG placement       Medications Prior to Admission:      Prior to Admission medications    Medication Sig Start Date End Date Taking?  Authorizing Provider   Nutritional Supplements (JEVITY 1 KENNEY/FIBER) LIQD 1 Can by Gastrostomy Tube route 4 times daily 4/4/19  Yes Saul Arreola MD   magnesium oxide (MAG-OX) 400 (241.3 Mg) MG TABS tablet Take 1 tablet by mouth daily 3/8/19  Yes Saul Arreola MD   diltiazem (DILACOR XR) 240 MG extended release capsule Take 240 mg by mouth daily   Yes Historical Provider, MD   pantoprazole sodium (PROTONIX) 40 MG PACK packet Take 1 packet by mouth 2 times daily (before meals) 12/25/18  Yes Demian Luther MD   aspirin 81 MG tablet Take 81 mg by mouth daily   Yes Historical Provider, MD   atorvastatin (LIPITOR) 10 MG tablet Take 10 mg by mouth nightly   Yes Historical Provider, MD   citalopram (CELEXA) 10 MG tablet Take 10 mg by mouth daily   Yes Historical Provider, MD   docusate (COLACE) 50 MG/5ML liquid Take 100 mg by mouth 2 times daily   Yes Historical Provider, MD   gabapentin (NEURONTIN) 400 MG capsule Take 400 mg by mouth 3 times daily. .   Yes Historical Provider, MD   ipratropium (ATROVENT) 0.02 % nebulizer solution Take 1.25 mg by nebulization every 4 hours as needed for Wheezing   Yes Historical Provider, MD   nitroGLYCERIN (NITROSTAT) 0.4 MG SL tablet Place 0.4 mg under the tongue every 5 minutes as needed for Chest pain up to max of 3 total doses. If no relief after 1 dose, call 911. Yes Historical Provider, MD   albuterol sulfate  (90 Base) MCG/ACT inhaler Inhale 2 puffs into the lungs every 6 hours as needed for Wheezing   Yes Historical Provider, MD   rivastigmine (EXELON) 4.6 MG/24HR Place 1 patch onto the skin daily   Yes Historical Provider, MD   sucralfate (CARAFATE) 1 GM/10ML suspension Take 1 g by mouth 4 times daily 12/11/18  Yes Historical Provider, MD   tamsulosin (FLOMAX) 0.4 MG capsule Take 0.4 mg by mouth daily   Yes Historical Provider, MD   umeclidinium-vilanterol (ANORO ELLIPTA) 62.5-25 MCG/INH AEPB inhaler Inhale 1 puff into the lungs daily 12/12/18   Historical Provider, MD       Allergies:  Heparin    Social History:           TOBACCO:   reports that he has been smoking cigarettes. He has a 50.00 pack-year smoking history. He has never used smokeless tobacco.  ETOH:   reports that he does not drink alcohol. Family History:               Problem Relation Age of Onset    Heart Disease Mother     Diabetes Mother     Cancer Father        REVIEW OF SYSTEMS:   Pertinent positives as noted in the HPI. All other systems reviewed and negative. PHYSICAL EXAM PERFORMED:    /69   Pulse 96   Temp 97 °F (36.1 °C) (Oral)   Resp 18   Ht 5' 5\" (1.651 m)   Wt 112 lb 8 oz (51 kg)   SpO2 98%   BMI 18.72 kg/m²     General appearance:  No apparent distress, appears stated age answers yes/no with some delay in response  HEENT:  Normal cephalic, atraumatic without obvious deformity. Neck: Supple, with full range of motion.  No jugular venous distention. Trachea midline. Respiratory:  Normal respiratory effort. Clear to auscultation, bilaterally without Rales/Wheezes/Rhonchi. Cardiovascular:  Regular rate and rhythm with normal S1/S2 without murmurs, rubs or gallops. Abdomen: Soft, non-tender, non-distended with normal bowel sounds. Musculoskeletal:  No clubbing, cyanosis or edema bilaterally. Full range of motion without deformity. Skin: Skin color, texture, turgor normal.  No rashes or lesions. Labs:     Recent Labs     04/09/19  1400 04/10/19  0017   WBC 11.4*  --    HGB 11.6* 9.6*   HCT 36.0* 30.2*     --      Recent Labs     04/09/19  1400      K 3.9   CL 92*   CO2 39*   BUN 19   CREATININE 0.8   CALCIUM 9.3     Recent Labs     04/09/19  1400   AST 14*   ALT 14   BILITOT 0.3   ALKPHOS 58     Recent Labs     04/09/19  1400   INR 1.11     Recent Labs     04/09/19  1400   TROPONINI <0.01       Urinalysis:      Lab Results   Component Value Date    NITRU Negative 04/09/2019    WBCUA 3-5 04/09/2019    BACTERIA 1+ 04/01/2019    RBCUA 20-50 04/09/2019    BLOODU LARGE 04/09/2019    SPECGRAV <=1.005 04/09/2019    GLUCOSEU Negative 04/09/2019       Radiology:          CT ABDOMEN PELVIS W IV CONTRAST Additional Contrast? None   Final Result   1. Moderate circumferential wall thickening of the distal esophagus likely   due to esophagitis; correlate with endoscopy. 2. Unchanged 5.8 cm infrarenal abdominal aortic aneurysm status post   aortobi-iliac endograft stent. 3. Unchanged bilateral simple and complex renal cyst.  Recommend nonemergent   CT of the abdomen with without contrast using renal mass protocol. XR CHEST PORTABLE   Final Result   COPD and chronic interstitial changes are stable. No acute finding.              ASSESSMENT:    Active Hospital Problems    Diagnosis Date Noted    GI bleed [K92.2] 04/09/2019         PLAN:    1) Anemia  - suspected upper GI blood loss, was transfused at [facility], NPO for EGD later today  - continue sucralfate, protonix infusion    2) COPD  - at baseline O2 requirement    3) Atrial fib  - rate controlled, holding anticoag    4) recent UTI  - check follow up cultures    DVT Prophylaxis: scd  Diet: Diet NPO Time Specified  Code Status: Full Dominique Clements MD    Thank you Atanacio Riedel, APRN - KEON for the opportunity to be involved in this patient's care. If you have any questions or concerns please feel free to contact me at [phone].

## 2019-04-10 NOTE — ED PROVIDER NOTES
ED Course as of Apr 10 0010   Tue Apr 09, 2019 1758 Microscopic Urinalysis(!):    WBC, UA 3-5   RBC, UA 20-50(!)   Epi Cells 0-2 [WL]   1758 Lactate, Sepsis:    Lactic Acid, Sepsis 1.2 [WL]   1758 Urinalysis Reflex to Culture(!):    Color, UA Yellow   Clarity, UA SL CLOUDY(!)   Glucose, UA Negative   Bilirubin, Urine Negative   Ketones, Urine Negative   Specific Gravity, UA <=1.005   Blood, Urine LARGE(!)   pH, UA 7.0   Protein, UA Negative   Urobilinogen, Urine 0.2   Nitrite, Urine Negative   Leukocyte Esterase, Urine Negative   Microscopic Examination YES   Urine Reflex to Culture Not Indicated   Urine Type Not Specified [WL]   1758 Brain Natriuretic Peptide(!):    Pro-(!) [WL]   1758 Troponin:    Troponin <0.01 [WL]   1758 APTT:    aPTT 28.6 [WL]   1758 Comprehensive Metabolic Panel w/ Reflex to MG(!):    Sodium 139   Potassium 3.9   Chloride 92(!)   CO2 39(!)   Anion Gap 8   Glucose 129(!)   BUN 19   Creatinine 0.8   GFR Non- >60   GFR African American >60   Calcium 9.3   Total Protein 6.4   Albumin 3.4   Albumin/Globulin Ratio 1.1   Bilirubin 0.3   Alk Phos 58   ALT 14   AST 14(!)   Globulin 3.0 [WL]   1758 Protime-INR:    Prothrombin Time 12.6   INR 1.11 [WL]   1758 CBC Auto Differential(!):    WBC 11.4(!)   RBC 4.14(!)   Hemoglobin Quant 11.6(!)   Hematocrit 36.0(!)   MCV 87.1   MCH 28.1   MCHC 32.2   RDW 17.1(!)   Platelet Count 385   MPV 9.0   Neutrophils % 73.6   Lymphocyte % 9.3   Monocytes % 12.7   Eosinophils % 3.9   Basophils % 0.5   Neutrophils # 8.4(!)   Lymphocytes # 1.1   Monocytes # 1.4(!)   Eosinophils # 0.4   Basophils # 0.1 [WL]   1759 Sinus rhythm at 108. Normal axis. . No acute ST-T changes.   Compared to prior April 1, 2019, similar to prior   EKG 12 Lead [WL]   1759 CT ABDOMEN PELVIS W IV CONTRAST Additional Contrast? None [WL]   1800 XR CHEST PORTABLE [WL]   1800 I have personally performed and/or participated in the history, exam and medical decision making and agree with all pertinent clinical information. I have also reviewed and agree with the past medical, family and social history unless otherwise noted. Patient presents for evaluation of dark black stool foul-smelling times one episode today.   About a week ago he was here for esophageal dilation,    [WL]      ED Course User Index  [WL] DO Valente Whaley DO  04/10/19 0010

## 2019-04-10 NOTE — PROGRESS NOTES
RESPIRATORY THERAPY ASSESSMENT    Name:  Tiara Fairchild  Medical Record Number:  [id number]  Age: 80 y.o. Gender: male  : 1950  Today's Date:  4/10/2019  Room:  0228/0228-02    Assessment     Is the patient being admitted for a COPD or Asthma exacerbation? No   (If yes the patient will be seen every 4 hours for the first 24 hours and then reassessed)    Patient Admission Diagnosis      Allergies  Allergies   Allergen Reactions    Heparin      Pt was HIT positive in        Minimum Predicted Vital Capacity:     925          Actual Vital Capacity:      380              Pulmonary History:COPD, PNA  Home Oxygen Therapy:  2-3 liters/min via  NC. Home Respiratory Therapy:  Albuterol 2PUFFS Q6PRN,  Atrovent Q4PRN,  Anoro Ellipta Daily. Current Respiratory Therapy:  Albuterol MDI 2 PUFFS Q6 PRN. Treatment Type: MDI  Medications: Albuterol    Respiratory Severity Index(RSI)   Patients with orders for inhalation medications, oxygen, or any therapeutic treatment modality will be placed on Respiratory Protocol. They will be assessed with the first treatment and at least every 72 hours thereafter. The following severity scale will be used to determine frequency of treatment intervention.     Smoking History: Pulmonary Disease or Smoking History, Greater than 15 pack year = 2    Social History  Social History     Tobacco Use    Smoking status: Current Every Day Smoker     Packs/day: 1.00     Years: 50.00     Pack years: 50.00     Types: Cigarettes    Smokeless tobacco: Never Used    Tobacco comment: pt states d/t hospital stays he hasnt smoke in 5 months but will continue eventually- educated with no interest in stopping   Substance Use Topics    Alcohol use: No    Drug use: No       Recent Surgical History: None = 0  Past Surgical History  Past Surgical History:   Procedure Laterality Date    UPPER GASTROINTESTINAL ENDOSCOPY N/A 2018    EGD DILATION BALLOON performed by Rigo Rodriguez Ash Sam MD at [facility] N/A 12/13/2018    EGD DILATION BALLOON performed by Susan Reyes MD at [place] [address]  12/23/2018    foreign body removal, balloon dilation    UPPER GASTROINTESTINAL ENDOSCOPY N/A 12/23/2018    EGD FOREIGN BODY REMOVAL performed by Susan Reyes MD at [place] [address]  12/23/2018    EGD DILATION BALLOON performed by Susan Reyes MD at [place] [address] N/A 1/30/2019    EGD W/20X8 STENT PLACEMENT W/ANES.  (9:30) performed by Susan Reyes MD at [place] [address]  1/30/2019    EGD DILATION BALLOON performed by Susan Reyes MD at [place] [address]  1/30/2019    EGD BIOPSY performed by Susan Reyes MD at [place] [address]  03/01/2019    Esophageal stent removed    UPPER GASTROINTESTINAL ENDOSCOPY N/A 3/1/2019    EGD DILATION BALLOON performed by Susan Reyes MD at [place] [address]  3/1/2019    EGD STENT REMOVAL performed by Susan Reyes MD at [place] [address] N/A 3/4/2019    EGD BIOPSY performed by Nancy Cummings DO at [place] [address]  3/4/2019    EGD SUBMUCOSAL/BOTOX INJECTION performed by Nancy Cummings DO at [place] [address]  3/4/2019    EGD DILATION BALLOON performed by Nancy Cummings DO at [place] [address]  04/02/2019    EGD with PEG placement       Level of Consciousness: Alert, Oriented, and Cooperative = 0    Level of Activity: Mostly sedentary, minimal walking = 1    Respiratory Pattern: Regular Pattern; RR 8-20 = 0    Breath Sounds: Diminshed bilaterally and/or crackles = 2    Sputum   ,  , Sputum How Obtained: Spontaneous cough  Cough: Strong, spontaneous, non-productive = 0    Vital Signs   /69   Pulse 96   Temp 97 °F (36.1 °C) (Oral)   Resp 18   Ht 5' 5\" (1.651 m)   Wt 112 lb 8 oz (51 kg)   SpO2 98%   BMI 18.72 kg/m²   SPO2 (COPD values may differ): 90-91% on room air or greater than 92% on FiO2 24- 28% = 1    Peak Flow (asthma only): not applicable = 0    RSI: 5-6 = Q4hr PRN (every four hours as needed) for dyspnea        Plan       Goals: medication delivery    Patient/caregiver was educated on the proper method of use for Respiratory Care Devices:  Yes      Level of patient/caregiver understanding able to:   ? Verbalize understanding   ? Demonstrate understanding       ? Teach back        ? Needs reinforcement       ? No available caregiver               ? Other:     Response to education:  Good     Is patient being placed on Home Treatment Regimen? Yes     Does the patient have everything they need prior to discharge? NA     Comments: Chart reviewed and patient assessed. Plan of Care: Albuterol MDI 2puffs Q6PRN. Electronically signed by Garrett Santana RCP on 4/10/2019 at 12:25 AM    Respiratory Protocol Guidelines     1. Assessment and treatment by Respiratory Therapy will be initiated for medication and therapeutic interventions upon initiation of aerosolized medication. 2. Physician will be contacted for respiratory rate (RR) greater than 35 breaths per minute. Therapy will be held for heart rate (HR) greater than 140 beats per minute, pending direction from physician. 3. Bronchodilators will be administered via Metered Dose Inhaler (MDI) with spacer when the following criteria are met:  a. Alert and cooperative     b. HR < 140 bpm  c. RR < 30 bpm                d. Can demonstrate a 2-3 second inspiratory hold  4.  Bronchodilators will be administered via Hand Held Nebulizer NESTOR [facility]) to patients when ANY of the following criteria are met  a. Incognizant or uncooperative          b. Patients treated with HHN at Home        c. Unable to demonstrate proper use of MDI with spacer     d. RR > 30 bpm   5. Bronchodilators will be delivered via Metered Dose Inhaler (MDI), HHN, Aerogen to intubated patients on mechanical ventilation. 6. Inhalation medication orders will be delivered and/or substituted as outlined below. Aerosolized Medications Ordering and Administration Guidelines:    1. All Medications will be ordered by a physician, and their frequency and/or modality will be adjusted as defined by the patients Respiratory Severity Index (RSI) score. 2. If the patient does not have documented COPD, consider discontinuing anticholinergics when RSI is less than 9.  3. If the bronchospasm worsens (increased RSI), then the bronchodilator frequency can be increased to a maximum of every 4 hours. If greater than every 4 hours is required, the physician will be contacted. 4. If the bronchospasm improves, the frequency of the bronchodilator can be decreased, based on the patient's RSI, but not less than home treatment regimen frequency. 5. Bronchodilator(s) will be discontinued if patient has a RSI less than 9 and has received no scheduled or as needed treatment for 72  Hrs. Patients Ordered on a Mucolytic Agent:    1. Must always be administered with a bronchodilator. 2. Discontinue if patient experiences worsened bronchospasm, or secretions have lessened to the point that the patient is able to clear them with a cough. Anti-inflammatory and Combination Medications:    1. If the patient lacks prior history of lung disease, is not using inhaled anti-inflammatory medication at home, and lacks wheezing by examination or by history for at least 24 hours, contact physician for possible discontinuation.

## 2019-04-10 NOTE — PROGRESS NOTES
Patient resting in bed with no acute signs of distress. Vital signs stable. Side rails up x2. Will continue to monitor.

## 2019-04-10 NOTE — OP NOTE
Gastroenterology Preop Assessment    Patient:   Enrique Redmond   :    1950   Facility:   [facility]  Referring/PCP: SIMONA Kirkland - KEON  Date:     4/10/2019    Subjective:   Procedure: egd    HPI/Reason for procedure:  melena    Past Medical History:   Diagnosis Date    [facility]     Dementia     Emphysema of lung ([facility] [facility]     Esophageal stricture     Nondependent tobacco use disorder      Past Surgical History:   Procedure Laterality Date    UPPER GASTROINTESTINAL ENDOSCOPY N/A 2018    EGD DILATION BALLOON performed by Warren Evans MD at [place] [address] N/A 2018    EGD DILATION BALLOON performed by Warren Evans MD at [place] [address]  2018    foreign body removal, balloon dilation    UPPER GASTROINTESTINAL ENDOSCOPY N/A 2018    EGD FOREIGN BODY REMOVAL performed by Warren Evans MD at [place] [address]  2018    EGD DILATION BALLOON performed by Warren Evans MD at [place] [address] 2019    EGD W/20X8 STENT PLACEMENT W/ANES.  (9:30) performed by Warren Evans MD at [place] [address]  2019    EGD DILATION BALLOON performed by Warren Evans MD at [place] [address]  2019    EGD BIOPSY performed by Warren Evans MD at [place] [address]  2019    Esophageal stent removed    UPPER GASTROINTESTINAL ENDOSCOPY N/A 3/1/2019    EGD DILATION BALLOON performed by Warren Evans MD at [place] [address]  3/1/2019    EGD STENT REMOVAL performed by Warren Evans MD at [address] [place] [address] UPPER GASTROINTESTINAL ENDOSCOPY N/A 3/4/2019    EGD BIOPSY performed by Amos Morris DO at [place] [address]  3/4/2019    EGD SUBMUCOSAL/BOTOX INJECTION performed by Amos Morris DO at [facility] ENDOSCOPY  3/4/2019    EGD DILATION BALLOON performed by Amos Morris DO at [place] [address]  04/02/2019    EGD with PEG placement       Social:   Social History     Tobacco Use    Smoking status: Current Every Day Smoker     Packs/day: 1.00     Years: 50.00     Pack years: 50.00     Types: Cigarettes    Smokeless tobacco: Never Used    Tobacco comment: pt states d/t hospital stays he hasnt smoke in 5 months but will continue eventually- educated with no interest in stopping   Substance Use Topics    Alcohol use: No     Family:   Family History   Problem Relation Age of Onset    Heart Disease Mother     Diabetes Mother     Cancer Father        Scheduled Medications:    tamsulosin  0.4 mg Oral Daily    sucralfate  1 g Oral 4 times per day    atorvastatin  10 mg Oral Nightly    citalopram  10 mg Oral Daily    gabapentin  400 mg Oral TID    rivastigmine  1 patch Transdermal Daily    diltiazem  240 mg Oral Daily    magnesium oxide  400 mg Oral Daily    sodium chloride flush  10 mL Intravenous 2 times per day       Current Medications:    Prior to Admission medications    Medication Sig Start Date End Date Taking?  Authorizing Provider   Nutritional Supplements (JEVITY 1 KENNEY/FIBER) LIQD 1 Can by Gastrostomy Tube route 4 times daily 4/4/19  Yes David Deleon MD   magnesium oxide (MAG-OX) 400 (241.3 Mg) MG TABS tablet Take 1 tablet by mouth daily 3/8/19  Yes David Deleon MD   diltiazem (DILACOR XR) 240 MG extended release capsule Take 240 mg by mouth daily   Yes Historical Provider, MD   pantoprazole sodium (PROTONIX) 40 MG PACK packet Take 1 packet by mouth 2 times daily Donnie Moon MD    sucralfate (CARAFATE) tablet 1 g, 1 g, Oral, 4 times per day, Davon Khan MD    atorvastatin (LIPITOR) tablet 10 mg, 10 mg, Oral, Nightly, Davon Khan MD    citalopram (CELEXA) tablet 10 mg, 10 mg, Oral, Daily, Davon Khan MD    gabapentin (NEURONTIN) capsule 400 mg, 400 mg, Oral, TID, Davon Khan MD    nitroGLYCERIN (NITROSTAT) SL tablet 0.4 mg, 0.4 mg, Sublingual, Q5 Min PRN, Davon Khan MD    albuterol sulfate  (90 Base) MCG/ACT inhaler 2 puff, 2 puff, Inhalation, Q6H PRN, Davon Khan MD, 2 puff at 04/09/19 2243    rivastigmine (EXELON) 4.6 MG/24HR 1 patch, 1 patch, Transdermal, Daily, Davon Khan MD, 1 patch at 04/10/19 0947    diltiazem (CARDIZEM CD) extended release capsule 240 mg, 240 mg, Oral, Daily, Davon Khan MD    magnesium oxide (MAG-OX) tablet 400 mg, 400 mg, Oral, Daily, Davon Khan MD    sodium chloride flush 0.9 % injection 10 mL, 10 mL, Intravenous, 2 times per day, Davon Khan MD, 10 mL at 04/10/19 0948    sodium chloride flush 0.9 % injection 10 mL, 10 mL, Intravenous, PRN, Davon Khan MD    acetaminophen (TYLENOL) tablet 650 mg, 650 mg, Oral, Q4H PRN, Davon Khan MD    ondansetron [facility]) injection 4 mg, 4 mg, Intravenous, Q6H PRN, Davon Khan MD    0.9 % sodium chloride infusion, , Intravenous, Continuous, Davon Khan MD, Last Rate: 75 mL/hr at 04/10/19 1339      Infusions:    pantoprozole (PROTONIX) infusion 8 mg/hr (04/10/19 1252)    sodium chloride 75 mL/hr at 04/10/19 1339     PRN Medications: nitroGLYCERIN, albuterol sulfate HFA, sodium chloride flush, acetaminophen, ondansetron  Allergies:    Allergies   Allergen Reactions    Heparin      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, 2018         Objective:     Physical Exam:   /72   Pulse 77   Temp 97 °F (36.1 °C) (Temporal)   Resp 18   Ht 5' 5\" (1.651 m)   Wt 112 lb 8 oz (51 kg)   SpO2 99%   BMI 18.72 kg/m²     HEENT: NCAT  Lungs: CTAB  CV: RRR  Abd: soft, ntd  Ext: dpi    Lab and Imaging Review   Labs:  CBC:   Recent Labs     04/09/19  1400 04/10/19  0017 04/10/19  0755   WBC 11.4*  --   --    HGB 11.6* 9.6* 9.0*   HCT 36.0* 30.2* 28.2*   MCV 87.1  --   --      --   --      BMP:   Recent Labs     04/09/19  1400 04/10/19  0755    137   K 3.9 3.7   CL 92* 98*   CO2 39* 33*   BUN 19 12   CREATININE 0.8 <0.5*     LIVER PROFILE:   Recent Labs     04/09/19  1400   AST 14*   ALT 14   PROT 6.4   BILITOT 0.3   ALKPHOS 58     PT/INR:   Recent Labs     04/09/19  1400   INR 1.11       Pre-Procedure Assessment / Plan:  ASA: Class 3 - A patient with severe systemic disease that limits activity but is not incapacitating  Airway: Mallampati: II (soft palate, uvula, fauces visible)  Level of Sedation Plan: Moderate sedation  Post Procedure plan: Return to same level of care      Plan:   1. EGD    I assessed the patient and find that the patient is in satisfactory condition to proceed with the planned procedure and sedation plan. I have explained the risk, benefits, and alternatives to the procedure; the patient understands and agrees to proceed.        Colleen Quiros  4/10/2019

## 2019-04-10 NOTE — PROGRESS NOTES
Pt continuing to refuse to take medication. Will have night shift nurse attempt with other 2100 meds.

## 2019-04-10 NOTE — PROGRESS NOTES
Admission questions and assessment complete at this time. Pt is alert and oriented. Vital signs are WNL. Respirations are even & easy. Four eyes complete at this time. Abdominal binder removed. PEG tube dry and intact. Split guaze replaced. Bowel sounds present. Tender around insertion site. Pt refused SCDs educated on importance. Pt also refusing all evening medication at this time. States sometimes he does this at home as well. Educated on importance of medication adherence. No complaints voiced. Pt denies needs at this time. SR up x 2 and bed in low position. Call light is within reach. Will monitor.

## 2019-04-10 NOTE — PROGRESS NOTES
Pt cursing at the nurse, states \" I need something to eat. \"  Explained to pt that his can have something after his EGD.

## 2019-04-10 NOTE — OP NOTE
Esophagogastroduodenoscopy Note    Patient:   Halley Flores    YOB: 1950    Facility:   [facility] [Inpatient]   Referring/PCP: SIMONA Lewis CNP    Procedure:   Esophagogastroduodenoscopy with esophageal dilation  Date:     4/10/2019   Endoscopist:  Manoj Moffett     Preoperative Diagnosis:   Melena    Postoperative Diagnosis:  Esophageal stricture    Anesthesia:  Versed 4 mg IV, fentanyl 75 mcg IV Procedural Sedation Time: 14 minutes. Estimated blood loss: Minimal    Complications: None    Description of Procedure:  Informed consent was obtained from the patient after explanation of the procedure including indications, description of the procedure,  benefits and possible risks and complications of the procedure, and alternatives. Questions were answered. The patient's history was reviewed and a directed physical examination was performed prior to the procedure. Patient was monitored throughout the procedure with pulse oximetry and periodic assessment of vital signs. Patient was sedated as noted above. The Nursing staff and I performed a time out. With the patient in the left lateral decubitus position, the Olympus videoendoscope was placed in the patient's mouth and under direct visualization passed into the esophagus. The scope was ultimately passed to the second portion of the duodenum. Visualization was performed during both introduction and withdrawal of the endoscope and retroflexed view of the proximal stomach was obtained. Findings[de-identified]   Esophagus: The diaphragmatic hiatus was at 44 cm. The GE junction was at 40 cm. Ectopic mucosa was seen up to 35 cm. The findings dosupport a diagnosis of Kauffman's Esophagus. The esophagus was dilated with a 10-12 mm balloon with no dilation. A 12-15mm balloon was used to further dilated his distal esophagus  Stomach: normal  Duodenum: normal    Recommendations:   -Continue acid suppression.   No active bleeding or ulcer  Dilated to 15 mm  Ok with discharge.   Recommend repeat EGD with dilation in 2 weeks    Tay Neigh, DO

## 2019-04-10 NOTE — PROGRESS NOTES
Progress Note    Admit Date:  4/9/2019    Subjective:  Mr. More Postal denies any concerns at this time. Plan for EGD today. Objective:   No data found. Intake/Output Summary (Last 24 hours) at 4/10/2019 1142  Last data filed at 4/10/2019 1040  Gross per 24 hour   Intake 1099 ml   Output 400 ml   Net 699 ml       Physical Exam:  General appearance:  No apparent distress, appears stated age answers yes/no with some delay in response, difficult to understand at times  HEENT:  Normal cephalic, atraumatic without obvious deformity. Neck: Supple, with full range of motion. Trachea midline. Respiratory:  Normal respiratory effort. Clear to auscultation, bilaterally without Rales/Wheezes/Rhonchi. On 2L NC (baseline)  Cardiovascular:  Regular rate and rhythm with normal S1/S2 without murmurs, rubs or gallops. Abdomen: Soft, non-tender, non-distended with normal bowel sounds. Musculoskeletal:  No clubbing, cyanosis or edema bilaterally. Full range of motion without deformity. Skin: Skin color, texture, turgor normal.  No rashes or lesions. ISRAEL Lugo have reviewed the chart on [facility] and personally interviewed and examined patient, reviewed the data (labs and imaging) and after discussion with my PA formulated the plan. Agree with note with the following edits. HPI:     I reviewed the patient's Past Medical History, Past Surgical History, Medications, and Allergies. Denies any issues  No abd pain         General:  eldelry male, Awake, alert and oriented. Appears to be not in any distress  Mucous Membranes:  Pink , anicteric  Neck: No JVD, no carotid bruit, no thyromegaly  Chest:  Clear to auscultation bilaterally, no added sounds  Cardiovascular:  RRR S1S2 heard, no murmurs or gallops  Abdomen:  Soft, undistended, non tender, no organomegaly, BS present  PEG in place   Extremities: No edema or cyanosis.  Distal pulses well felt  Neurological : grossly normal with baseline dementia              Scheduled Meds:   tamsulosin  0.4 mg Oral Daily    sucralfate  1 g Oral 4 times per day    atorvastatin  10 mg Oral Nightly    citalopram  10 mg Oral Daily    gabapentin  400 mg Oral TID    rivastigmine  1 patch Transdermal Daily    diltiazem  240 mg Oral Daily    magnesium oxide  400 mg Oral Daily    sodium chloride flush  10 mL Intravenous 2 times per day       Continuous Infusions:   pantoprozole (PROTONIX) infusion 8 mg/hr (04/10/19 0209)    sodium chloride 100 mL/hr at 04/10/19 0734       PRN Meds:  nitroGLYCERIN, albuterol sulfate HFA, sodium chloride flush, acetaminophen, ondansetron      Data:  CBC:   Recent Labs     04/09/19  1400 04/10/19  0017 04/10/19  0755   WBC 11.4*  --   --    HGB 11.6* 9.6* 9.0*   HCT 36.0* 30.2* 28.2*   MCV 87.1  --   --      --   --      BMP:   Recent Labs     04/09/19  1400 04/10/19  0755    137   K 3.9 3.7   CL 92* 98*   CO2 39* 33*   BUN 19 12   CREATININE 0.8 <0.5*     LIVER PROFILE:   Recent Labs     04/09/19  1400   AST 14*   ALT 14   BILITOT 0.3   ALKPHOS 58     PT/INR:   Recent Labs     04/09/19  1400   PROTIME 12.6   INR 1.11     CULTURES    Urine cx: pending    Blood cx x2: pending     RADIOLOGY    CT ABDOMEN PELVIS W IV CONTRAST Additional Contrast? None 4/9/2019   Final Result   1. Moderate circumferential wall thickening of the distal esophagus likely   due to esophagitis; correlate with endoscopy. 2. Unchanged 5.8 cm infrarenal abdominal aortic aneurysm status post   aortobi-iliac endograft stent. 3. Unchanged bilateral simple and complex renal cyst.  Recommend nonemergent   CT of the abdomen with without contrast using renal mass protocol. XR CHEST PORTABLE 4/9/2019   Final Result   COPD and chronic interstitial changes are stable. No acute finding.            Assessment/Plan:    Anemia  - suspected upper GI blood loss with recent EGD dilatation  - was transfused at [facility]  - Admitted to Med Surg  -

## 2019-04-10 NOTE — PLAN OF CARE
Pt currently NPO for EGD this afternoon. Receiving IV fluids. Has small stage 2 on sacrum with Mepilex in place. Pt safety being maintained. Pt is not an intentional harm to self.

## 2019-04-10 NOTE — PROGRESS NOTES
Dr. Sheets Isabel in regards to pt's lab drawn. Lab unable to get enough blood & pt refusing to be stuck again. Awaiting response.

## 2019-04-10 NOTE — PROGRESS NOTES
Nutrition Assessment    Type and Reason for Visit: Initial, Consult(Zion Score )    Nutrition Recommendations:   1. Continue NPO for EGD  2. Consider starting Tube Feeding of Anthonette Klinefelter. 2 240 cc Q 6 hr with 100 cc water flush  3. SLP to assess swallow and safety for PO foods and diet consistency recommendation        Nutrition Assessment:   Pt. severely malnourished severe AEB muscle and fat lost.  At risk for further nutritional compromise r/t NPO status for GI bleed and has G tube in place and does not have teeth . Will continue NPO. TF recs placed and SLP consult for appropriate diet consistency. Malnutrition Assessment:  · Malnutrition Status: Meets the criteria for severe malnutrition  · Context: Acute illness or injury  · Findings of the 6 clinical characteristics of malnutrition (Minimum of 2 out of 6 clinical characteristics is required to make the diagnosis of moderate or severe Protein Calorie Malnutrition based on AND/ASPEN Guidelines):  1. Energy Intake-Unable to assess, Unable to assess    2. Weight Loss-No significant weight loss, in 3 months  3. Fat Loss-Severe subcutaneous fat loss, Orbital, Triceps  4. Muscle Loss-Severe muscle mass loss, Temples (temporalis muscle), Clavicles (pectoralis and deltoids), Scapula (trapezius)  5. Fluid Accumulation-No significant fluid accumulation, Extremities  6.   Strength-Not measured    Nutrition Risk Level: High    Nutrient Needs:  · Estimated Daily Total Kcal: 9157-7374  · Estimated Daily Protein (g):   · Estimated Daily Total Fluid (ml/day): 4783-9220    Nutrition Diagnosis:   · Problem: Inadequate oral intake  · Etiology: related to Cognitive or neurological impairment, Alteration in GI function     Signs and symptoms:  as evidenced by NPO status due to medical condition    Objective Information:  · Nutrition-Focused Physical Findings: pt was awake lying in bed; thin ; confused; noted body mass wasting ; moist eyes and mouth, no teeth, · Wound Type: Open Wounds, Pressure Ulcer, Stage II(sacrum )  · Current Nutrition Therapies:  · Oral Diet Orders: NPO   · Oral Diet intake: NPO  · Oral Nutrition Supplement (ONS) Orders: None  · ONS intake: NPO  · Anthropometric Measures:  · Ht: 5' 5\" (165.1 cm)   · Current Body Wt: 112 lb (50.8 kg)  · Admission Body Wt: 112 lb (50.8 kg)  · Usual Body Wt: 85 lb (38.6 kg)(12/14/18 RD assessed)  · % Weight Change:  ,  (31% increse in 4 months )  · Ideal Body Wt: 146 lb (66.2 kg), % Ideal Body (82)  · Adjusted Body Wt: (NA), body weight adjusted for (NA)  · BMI Classification: BMI 18.5 - 24.9 Normal Weight    Nutrition Interventions:   Continue NPO, Start Tube Feeding  Continued Inpatient Monitoring, Coordination of Care, Coordination of Community Care    Nutrition Evaluation:   · Evaluation: Goals set   · Goals: pt will have 100% of his nutritional needs met per G tibe and PO inatkes and his weight will be 112# or >     · Monitoring: Nutrition Progression, Weight      Electronically signed by Mary Macedo RD, LD on 4/10/19 at 2:12 PM    Contact Number: [phone]

## 2019-04-10 NOTE — PROGRESS NOTES
Pt all of supper. Refusing medication that were held earlier today when pt was NPO. Pt states \"I ate too much & am full. I need to let my belly settle down. \" Will offer again before nurse ends this shift.

## 2019-04-10 NOTE — H&P
Gastroenterology Preop Assessment    Patient:   Kimmy Corona   :    1950   Facility:   [facility]  Referring/PCP: Jing Sutton APRN - KEON  Date:     4/10/2019    Subjective:   Procedure: egd    HPI/Reason for procedure:  melena    Past Medical History:   Diagnosis Date    [facility] [address]     Dementia     Emphysema of lung [facility] [address]     Esophageal stricture     Nondependent tobacco use disorder      Past Surgical History:   Procedure Laterality Date    UPPER GASTROINTESTINAL ENDOSCOPY N/A 2018    EGD DILATION BALLOON performed by Elkin Rodarte MD at [place] [address] N/A 2018    EGD DILATION BALLOON performed by Elkin Rodarte MD at [place] [address]  2018    foreign body removal, balloon dilation    UPPER GASTROINTESTINAL ENDOSCOPY N/A 2018    EGD FOREIGN BODY REMOVAL performed by Elkin Rodarte MD at [place] [address]  2018    EGD DILATION BALLOON performed by Elkin Rodarte MD at [place] [address] 2019    EGD W/20X8 STENT PLACEMENT W/ANES.  (9:30) performed by Elkin Rodarte MD at [place] [address]  2019    EGD DILATION BALLOON performed by Elkin Rodarte MD at [place] [address]  2019    EGD BIOPSY performed by Elkin Rodarte MD at [place] [address]  2019    Esophageal stent removed    UPPER GASTROINTESTINAL ENDOSCOPY N/A 3/1/2019    EGD DILATION BALLOON performed by Elkin Rodarte MD at [place] [address]  3/1/2019    EGD STENT REMOVAL performed by Elkin Rodarte MD at [address] [place] [address] (before meals) 12/25/18  Yes Mateo Bonilla MD   aspirin 81 MG tablet Take 81 mg by mouth daily   Yes Historical Provider, MD   atorvastatin (LIPITOR) 10 MG tablet Take 10 mg by mouth nightly   Yes Historical Provider, MD   citalopram (CELEXA) 10 MG tablet Take 10 mg by mouth daily   Yes Historical Provider, MD   docusate (COLACE) 50 MG/5ML liquid Take 100 mg by mouth 2 times daily   Yes Historical Provider, MD   gabapentin (NEURONTIN) 400 MG capsule Take 400 mg by mouth 3 times daily. .   Yes Historical Provider, MD   ipratropium (ATROVENT) 0.02 % nebulizer solution Take 1.25 mg by nebulization every 4 hours as needed for Wheezing   Yes Historical Provider, MD   nitroGLYCERIN (NITROSTAT) 0.4 MG SL tablet Place 0.4 mg under the tongue every 5 minutes as needed for Chest pain up to max of 3 total doses. If no relief after 1 dose, call 911.    Yes Historical Provider, MD   albuterol sulfate  (90 Base) MCG/ACT inhaler Inhale 2 puffs into the lungs every 6 hours as needed for Wheezing   Yes Historical Provider, MD   rivastigmine (EXELON) 4.6 MG/24HR Place 1 patch onto the skin daily   Yes Historical Provider, MD   sucralfate (CARAFATE) 1 GM/10ML suspension Take 1 g by mouth 4 times daily 12/11/18  Yes Historical Provider, MD   tamsulosin (FLOMAX) 0.4 MG capsule Take 0.4 mg by mouth daily   Yes Historical Provider, MD   umeclidinium-vilanterol (ANORO ELLIPTA) 62.5-25 MCG/INH AEPB inhaler Inhale 1 puff into the lungs daily 12/12/18   Historical Provider, MD         Current Facility-Administered Medications:     [COMPLETED] pantoprazole (PROTONIX) 80 mg in sodium chloride 0.9 % 50 mL bolus, 80 mg, Intravenous, Once, Stopped at 04/09/19 1448 **FOLLOWED BY** pantoprazole (PROTONIX) 80 mg in sodium chloride 0.9 % 100 mL infusion, 8 mg/hr, Intravenous, Continuous, Mateo Bonilla MD, Last Rate: 10 mL/hr at 04/10/19 1252, 8 mg/hr at 04/10/19 1252    tamsulosin (FLOMAX) capsule 0.4 mg, 0.4 mg, Oral, Daily, Gallo Mcallister Debra Perez MD    sucralfate (CARAFATE) tablet 1 g, 1 g, Oral, 4 times per day, Susan Parson MD    atorvastatin (LIPITOR) tablet 10 mg, 10 mg, Oral, Nightly, Susan Parson MD    citalopram (CELEXA) tablet 10 mg, 10 mg, Oral, Daily, Susan Parson MD    gabapentin (NEURONTIN) capsule 400 mg, 400 mg, Oral, TID, Susan Parson MD    nitroGLYCERIN (NITROSTAT) SL tablet 0.4 mg, 0.4 mg, Sublingual, Q5 Min PRN, Susan Parson MD    albuterol sulfate  (90 Base) MCG/ACT inhaler 2 puff, 2 puff, Inhalation, Q6H PRN, Susan Parson MD, 2 puff at 04/09/19 2243    rivastigmine (EXELON) 4.6 MG/24HR 1 patch, 1 patch, Transdermal, Daily, Susan Parson MD, 1 patch at 04/10/19 0947    diltiazem (CARDIZEM CD) extended release capsule 240 mg, 240 mg, Oral, Daily, Susan Parson MD    magnesium oxide (MAG-OX) tablet 400 mg, 400 mg, Oral, Daily, Susan Parson MD    sodium chloride flush 0.9 % injection 10 mL, 10 mL, Intravenous, 2 times per day, Susan Parson MD, 10 mL at 04/10/19 0948    sodium chloride flush 0.9 % injection 10 mL, 10 mL, Intravenous, PRN, Susan Parson MD    acetaminophen (TYLENOL) tablet 650 mg, 650 mg, Oral, Q4H PRN, Susan Parson MD    ondansetron [facility]) injection 4 mg, 4 mg, Intravenous, Q6H PRN, Susan Parson MD    0.9 % sodium chloride infusion, , Intravenous, Continuous, Susan Parson MD, Last Rate: 75 mL/hr at 04/10/19 1339      Infusions:    pantoprozole (PROTONIX) infusion 8 mg/hr (04/10/19 1252)    sodium chloride 75 mL/hr at 04/10/19 1339     PRN Medications: nitroGLYCERIN, albuterol sulfate HFA, sodium chloride flush, acetaminophen, ondansetron  Allergies:    Allergies   Allergen Reactions    Heparin      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, 2018         Objective:     Physical Exam:   /61   Pulse 85   Temp 97 °F (36.1 °C) (Temporal)   Resp 16   Ht 5' 5\" (1.651 m)   Wt 112 lb 8 oz (51 kg)   SpO2 99%   BMI 18.72 kg/m²     HEENT: NCAT  Lungs: CTAB  CV: RRR  Abd: soft, ntd  Ext: dpi    Lab and Imaging Review   Labs:  CBC:   Recent Labs     04/09/19  1400 04/10/19  0017 04/10/19  0755   WBC 11.4*  --   --    HGB 11.6* 9.6* 9.0*   HCT 36.0* 30.2* 28.2*   MCV 87.1  --   --      --   --      BMP:   Recent Labs     04/09/19  1400 04/10/19  0755    137   K 3.9 3.7   CL 92* 98*   CO2 39* 33*   BUN 19 12   CREATININE 0.8 <0.5*     LIVER PROFILE:   Recent Labs     04/09/19  1400   AST 14*   ALT 14   PROT 6.4   BILITOT 0.3   ALKPHOS 58     PT/INR:   Recent Labs     04/09/19  1400   INR 1.11       Pre-Procedure Assessment / Plan:  ASA: Class 3 - A patient with severe systemic disease that limits activity but is not incapacitating  Airway: Mallampati: II (soft palate, uvula, fauces visible)  Level of Sedation Plan:Deep sedation  Post Procedure plan: Return to same level of care      Plan:   1. egd    I assessed the patient and find that the patient is in satisfactory condition to proceed with the planned procedure and sedation plan. I have explained the risk, benefits, and alternatives to the procedure; the patient understands and agrees to proceed.        [place]  4/10/2019

## 2019-04-11 VITALS
HEIGHT: 65 IN | TEMPERATURE: 97.4 F | BODY MASS INDEX: 18.74 KG/M2 | HEART RATE: 86 BPM | SYSTOLIC BLOOD PRESSURE: 104 MMHG | OXYGEN SATURATION: 94 % | RESPIRATION RATE: 18 BRPM | DIASTOLIC BLOOD PRESSURE: 62 MMHG | WEIGHT: 112.5 LBS

## 2019-04-11 LAB
HCT VFR BLD CALC: 29.3 % (ref 40.5–52.5)
HEMOGLOBIN: 9.5 G/DL (ref 13.5–17.5)

## 2019-04-11 PROCEDURE — 2580000003 HC RX 258: Performed by: INTERNAL MEDICINE

## 2019-04-11 PROCEDURE — 6370000000 HC RX 637 (ALT 250 FOR IP): Performed by: INTERNAL MEDICINE

## 2019-04-11 PROCEDURE — 6360000002 HC RX W HCPCS: Performed by: INTERNAL MEDICINE

## 2019-04-11 PROCEDURE — 99238 HOSP IP/OBS DSCHRG MGMT 30/<: CPT | Performed by: INTERNAL MEDICINE

## 2019-04-11 PROCEDURE — 85014 HEMATOCRIT: CPT

## 2019-04-11 PROCEDURE — C9113 INJ PANTOPRAZOLE SODIUM, VIA: HCPCS | Performed by: INTERNAL MEDICINE

## 2019-04-11 PROCEDURE — 36415 COLL VENOUS BLD VENIPUNCTURE: CPT

## 2019-04-11 PROCEDURE — 85018 HEMOGLOBIN: CPT

## 2019-04-11 RX ADMIN — MAGNESIUM OXIDE TAB 400 MG (241.3 MG ELEMENTAL MG) 400 MG: 400 (241.3 MG) TAB at 08:51

## 2019-04-11 RX ADMIN — PANTOPRAZOLE SODIUM 8 MG/HR: 40 INJECTION, POWDER, FOR SOLUTION INTRAVENOUS at 00:43

## 2019-04-11 RX ADMIN — Medication 10 ML: at 08:52

## 2019-04-11 RX ADMIN — DILTIAZEM HYDROCHLORIDE 240 MG: 240 CAPSULE, COATED, EXTENDED RELEASE ORAL at 08:52

## 2019-04-11 RX ADMIN — CITALOPRAM HYDROBROMIDE 10 MG: 20 TABLET ORAL at 08:52

## 2019-04-11 RX ADMIN — GABAPENTIN 400 MG: 400 CAPSULE ORAL at 08:52

## 2019-04-11 ASSESSMENT — PAIN SCALES - GENERAL: PAINLEVEL_OUTOF10: 0

## 2019-04-11 NOTE — PROGRESS NOTES
Patient leaving per squad on arrival to home. Discharge instructions given and patient voiced understanding. Copy of discharge and scripts with patient. IV removed. Vitals stable. CP and PE completed. Leaving with all personal belongings. No further needs upon discharge.

## 2019-04-11 NOTE — PROGRESS NOTES
Patient still refusing medications. Asking for his shoes states he is leaving. Informed the Doctors would be around this morning and we needed to wait until he was seen. Patient cussing saying he is fine and leaving today.

## 2019-04-11 NOTE — PROGRESS NOTES
PROGRESS NOTE  S:68 yrs Patient  admitted on 4/9/2019 with GI bleed [K92.2]  GI bleed [K92.2] . Today he complains of dysphagia diet    Exam:   Vitals:    04/11/19 0236   BP: (!) 96/56   Pulse: 71   Resp: 18   Temp: 97.5 °F (36.4 °C)   SpO2: 95%      General appearance: alert, appears stated age and cooperative  HEENT: Oropharynx clear, no lesions  Neck: no adenopathy and supple, symmetrical, trachea midline  Lungs: clear to auscultation bilaterally  Heart: regular rate and rhythm, S1, S2 normal, no murmur, click, rub or gallop  Abdomen: soft, non-tender; bowel sounds normal; no masses,  no organomegaly  Extremities: extremities normal, atraumatic, no cyanosis or edema     Medications: Reviewed    Labs:  CBC:   Recent Labs     04/09/19  1400 04/10/19  0017 04/10/19  0755 04/11/19  0604   WBC 11.4*  --   --   --    HGB 11.6* 9.6* 9.0* 9.5*   HCT 36.0* 30.2* 28.2* 29.3*   MCV 87.1  --   --   --      --   --   --      BMP:   Recent Labs     04/09/19  1400 04/10/19  0755    137   K 3.9 3.7   CL 92* 98*   CO2 39* 33*   BUN 19 12   CREATININE 0.8 <0.5*     LIVER PROFILE:   Recent Labs     04/09/19  1400   AST 14*   ALT 14   PROT 6.4   BILITOT 0.3   ALKPHOS 58     PT/INR:   Recent Labs     04/09/19  1400   INR 1.11     Impression:80year old male with COPD, esophageal stricture and dementia s/p PEG tube admitted with anemia. EGD yesterday showed persistent esophageal stricture    Recommendation:  1. Continue PPI BID  2. Dysphagia diet  3. PEG tube feeds for nutrition  4.  Ok to d/c from [facility] Tere Martin MD  12:59 PM 4/11/2019

## 2019-04-11 NOTE — PROGRESS NOTES
Patient in bed resting with eyes closed. No distress noted. Will continue to monitor. Call light within reach.

## 2019-04-11 NOTE — PROGRESS NOTES
Pt resting in bed awake. Initial assessment completed. See flowsheet. No s/s of distress. Pt denies needs at this time. Call light within reach. Cont to monitor.

## 2019-04-11 NOTE — PROGRESS NOTES
Patient still refusing medications. Cusses st staff when asking if he wants to take them. Does not want help with repositioning either. Patient resting in bed awake. No distress noted. Call light within, will continue to monitor.

## 2019-04-11 NOTE — PROGRESS NOTES
Patient in bed resting. Refusing to take Carafate tonight, explained the importance of taking medication, Patient still refuses. Will try again later. Call light in reach, will monitor.

## 2019-04-11 NOTE — DISCHARGE INSTR - COC
Continuity of Care Form    Patient Name: Linda Arnold   :  1950  MRN:  [id number]    Admit date:  2019  Discharge date:  19    Code Status Order: Full Code   Advance Directives:   Advance Care Flowsheet Documentation     Date/Time Healthcare Directive Type of Healthcare Directive Copy in [address] Agent's Name Healthcare Agent's Phone Number    [phone]  No, patient does not have an advance directive for healthcare treatment -- -- -- -- --          Admitting Physician:  Luz Bal MD  PCP: SIMONA Nicole - CNP    Discharging Nurse: [facility] Unit/Room#: 5337/3982-96  Discharging Unit Phone Number: ***    Emergency Contact:   Extended Emergency Contact Information  Primary Emergency Contact: Nancy Yuen  Address: [address] [place] [address] [address], [address]  Home Phone: [phone]  Relation: Brother/Sister  Secondary Emergency Contact: Daughter In [place] of [address] Phone: [phone]  Relation: Other    Past Surgical History:  Past Surgical History:   Procedure Laterality Date    UPPER GASTROINTESTINAL ENDOSCOPY N/A 2018    EGD DILATION BALLOON performed by Branden Merrill MD at [place] [address] N/A 2018    EGD DILATION BALLOON performed by Branden Merrill MD at [place] [address]  2018    foreign body removal, balloon dilation    UPPER GASTROINTESTINAL ENDOSCOPY N/A 2018    EGD FOREIGN BODY REMOVAL performed by Branden Merrill MD at [place] [address]  2018    EGD DILATION BALLOON performed by Branden Merrill MD at [place] [address] 2019    EGD W/20X8 STENT PLACEMENT W/ANES.  (9:30) performed by Branden Merrill MD at [address] [place] [address] (healthcare-associated pneumonia) J18.9    Moderate protein-calorie malnutrition (HCC) E44.0    Migration of esophageal stent T85.528A    Dementia without behavioral disturbance F03.90    Upper GI bleed K92.2    Severe protein-calorie malnutrition (HCC) E43    Acute blood loss anemia D62       Isolation/Infection:   Isolation          No Isolation            Nurse Assessment:  Last Vital Signs: BP (!) 96/56   Pulse 71   Temp 97.5 °F (36.4 °C) (Oral)   Resp 18   Ht 5' 5\" (1.651 m)   Wt 112 lb 8 oz (51 kg)   SpO2 95%   BMI 18.72 kg/m²     Last documented pain score (0-10 scale): Pain Level: 0  Last Weight:   Wt Readings from Last 1 Encounters:   04/09/19 112 lb 8 oz (51 kg)     Mental Status:  {IP PT MENTAL STATUS:11001}    IV Access:  { CATRINA IV ACCESS:158604253}    Nursing Mobility/ADLs:  Walking   {[facility] DME MMOY:985918998}  Transfer  {[facility] VOAR:300015086}  Bathing  {[facility] DME TBKJ:886094596}  Dressing  {[facility] DME APKH:397914752}  Toileting  {[facility] DME EYMT:651255834}  Feeding  {[facility] RPGC:663481339}  Med Admin  {[facility] DME SWMR:701808366}  Med Delivery   {[facility] MED Delivery:311355126}    Wound Care Documentation and Therapy:  Wound 04/09/19 Sacrum (Active)   Wound Pressure Stage  2 4/10/2019  8:58 PM   Dressing Status Clean;Dry; Intact 4/11/2019  7:49 AM   Dressing/Treatment Foam 4/11/2019  7:49 AM   Wound Length (cm) 0.8 cm 4/10/2019  7:27 AM   Wound Width (cm) 0.5 cm 4/10/2019  7:27 AM   Wound Depth (cm) 0.1 cm 4/10/2019  7:27 AM   Wound Surface Area (cm^2) 0.4 cm^2 4/10/2019  7:27 AM   Wound Volume (cm^3) 0.04 cm^3 4/10/2019  7:27 AM   Wound Assessment Clean;Dry;Pink 4/11/2019  7:49 AM   Drainage Amount None 4/11/2019  7:49 AM   Jes-wound Assessment Clean;Dry;Pink 4/11/2019  7:49 AM   Number of days: 2        Elimination:  Continence:   · Bowel: {YES / PZ:86336}  · Bladder: {YES / JQ:18916}  Urinary Catheter: {Urinary Catheter:907945734}   Colostomy/Ileostomy/Ileal Conduit: {YES / IS:40077}       Date the above information and transfer of rCistine Keane  is necessary for the continuing treatment of the diagnosis listed and that he requires [address] for less 30 days. Update Admission H&P: No change in H&P    PHYSICIAN SIGNATURE:  Electronically signed by TIMBO Akins MD/ Frances Echeverria RN on 4/11/19 at 1:14 PM

## 2019-04-11 NOTE — PROGRESS NOTES
Pt refusing to ambulate at this time. Explained to pt the need to ambulate while checking spO2. Pt continues to refuse. States, \"I'm leaving this hospital today even if I have to jump out the window. \"

## 2019-04-11 NOTE — DISCHARGE SUMMARY
SpO2 94%   BMI 18.72 kg/m²     General:  eldelry male, Awake, alert and oriented. Appears to be not in any distress  Mucous Membranes:  Pink , anicteric  Neck: No JVD, no carotid bruit, no thyromegaly  Chest:  Clear to auscultation bilaterally, no added sounds  Cardiovascular:  RRR S1S2 heard, no murmurs or gallops  Abdomen:  Soft, undistended, non tender, no organomegaly, BS present  PEG in place   Extremities: No edema or cyanosis. Distal pulses well felt  Neurological : grossly normal with baseline dementia    CBC:   Recent Labs     04/09/19  1400 04/10/19  0017 04/10/19  0755 04/11/19  0604   WBC 11.4*  --   --   --    HGB 11.6* 9.6* 9.0* 9.5*   HCT 36.0* 30.2* 28.2* 29.3*   MCV 87.1  --   --   --      --   --   --      BMP:   Recent Labs     04/09/19  1400 04/10/19  0755    137   K 3.9 3.7   CL 92* 98*   CO2 39* 33*   BUN 19 12   CREATININE 0.8 <0.5*     LIVER PROFILE:   Recent Labs     04/09/19  1400   AST 14*   ALT 14   BILITOT 0.3   ALKPHOS 58     PT/INR:   Recent Labs     04/09/19  1400   PROTIME 12.6   INR 1.11     APTT:   Recent Labs     04/09/19  1400   APTT 28.6     UA:  Recent Labs     04/09/19  1720   COLORU Yellow   PHUR 7.0   WBCUA 3-5   RBCUA 20-50*   CLARITYU SL CLOUDY*   SPECGRAV <=1.005   LEUKOCYTESUR Negative   UROBILINOGEN 0.2   BILIRUBINUR Negative   BLOODU LARGE*   GLUCOSEU Negative     CULTURES    Blood cx x2: NGTD     RADIOLOGY    CT ABDOMEN PELVIS W IV CONTRAST Additional Contrast? None 4/9/2019   Final Result   1. Moderate circumferential wall thickening of the distal esophagus likely   due to esophagitis; correlate with endoscopy. 2. Unchanged 5.8 cm infrarenal abdominal aortic aneurysm status post   aortobi-iliac endograft stent. 3. Unchanged bilateral simple and complex renal cyst.  Recommend nonemergent   CT of the abdomen with without contrast using renal mass protocol.          XR CHEST PORTABLE 4/9/2019   Final Result   COPD and chronic interstitial changes are stable. No acute finding. Discharge Medications     Medication List      CONTINUE taking these medications    albuterol sulfate  (90 Base) MCG/ACT inhaler     ANORO ELLIPTA 62.5-25 MCG/INH Aepb inhaler  Generic drug:  umeclidinium-vilanterol     aspirin 81 MG tablet     citalopram 10 MG tablet  Commonly known as:  CELEXA     diltiazem 240 MG extended release capsule  Commonly known as:  DILACOR XR     docusate 50 MG/5ML liquid  Commonly known as:  COLACE     gabapentin 400 MG capsule  Commonly known as:  NEURONTIN     ipratropium 0.02 % nebulizer solution  Commonly known as:  ATROVENT     JEVITY 1 KENNEY/FIBER Liqd  1 Can by Gastrostomy Tube route 4 times daily     LIPITOR 10 MG tablet  Generic drug:  atorvastatin     magnesium oxide 400 (241.3 Mg) MG Tabs tablet  Commonly known as:  MAG-OX  Take 1 tablet by mouth daily     nitroGLYCERIN 0.4 MG SL tablet  Commonly known as:  NITROSTAT     pantoprazole sodium 40 MG Pack packet  Commonly known as:  PROTONIX  Take 1 packet by mouth 2 times daily (before meals)     rivastigmine 4.6 MG/24HR  Commonly known as:  EXELON     sucralfate 1 GM/10ML suspension  Commonly known as:  CARAFATE     tamsulosin 0.4 MG capsule  Commonly known as:  FLOMAX              Discharged in stable condition to home. Follow Up: Follow up with PCP in 1 week.     Zenon Wolf MD 4/11/2019 1:51 PM

## 2019-04-12 LAB — URINE CULTURE, ROUTINE: NORMAL

## 2019-04-14 LAB
BLOOD CULTURE, ROUTINE: NORMAL
CULTURE, BLOOD 2: NORMAL

## 2019-05-03 ENCOUNTER — HOSPITAL ENCOUNTER (INPATIENT)
Age: 69
LOS: 5 days | Discharge: HOME | DRG: 242 | End: 2019-05-08
Attending: INTERNAL MEDICINE | Admitting: INTERNAL MEDICINE
Payer: MEDICAID

## 2019-05-03 LAB
A/G RATIO: 1.2 (ref 1.1–2.2)
ABO/RH: NORMAL
ALBUMIN SERPL-MCNC: 2.8 G/DL (ref 3.4–5)
ALP BLD-CCNC: 52 U/L (ref 40–129)
ALT SERPL-CCNC: 14 U/L (ref 10–40)
ANION GAP SERPL CALCULATED.3IONS-SCNC: 8 MMOL/L (ref 3–16)
ANTIBODY SCREEN: NORMAL
AST SERPL-CCNC: 21 U/L (ref 15–37)
BILIRUB SERPL-MCNC: 0.4 MG/DL (ref 0–1)
BLOOD BANK DISPENSE STATUS: NORMAL
BLOOD BANK PRODUCT CODE: NORMAL
BPU ID: NORMAL
BUN BLDV-MCNC: 48 MG/DL (ref 7–20)
CALCIUM SERPL-MCNC: 8.1 MG/DL (ref 8.3–10.6)
CHLORIDE BLD-SCNC: 101 MMOL/L (ref 99–110)
CO2: 29 MMOL/L (ref 21–32)
CREAT SERPL-MCNC: 1 MG/DL (ref 0.8–1.3)
DESCRIPTION BLOOD BANK: NORMAL
GFR AFRICAN AMERICAN: >60
GFR NON-AFRICAN AMERICAN: >60
GLOBULIN: 2.4 G/DL
GLUCOSE BLD-MCNC: 111 MG/DL (ref 70–99)
GLUCOSE BLD-MCNC: 87 MG/DL (ref 70–99)
GLUCOSE BLD-MCNC: 88 MG/DL (ref 70–99)
GLUCOSE BLD-MCNC: 94 MG/DL (ref 70–99)
HCT VFR BLD CALC: 20.9 % (ref 40.5–52.5)
HCT VFR BLD CALC: 23.3 % (ref 40.5–52.5)
HEMOGLOBIN: 6.6 G/DL (ref 13.5–17.5)
HEMOGLOBIN: 7.4 G/DL (ref 13.5–17.5)
PERFORMED ON: NORMAL
POTASSIUM REFLEX MAGNESIUM: 4.5 MMOL/L (ref 3.5–5.1)
SODIUM BLD-SCNC: 138 MMOL/L (ref 136–145)
TOTAL PROTEIN: 5.2 G/DL (ref 6.4–8.2)

## 2019-05-03 PROCEDURE — 36415 COLL VENOUS BLD VENIPUNCTURE: CPT

## 2019-05-03 PROCEDURE — 85018 HEMOGLOBIN: CPT

## 2019-05-03 PROCEDURE — 2580000003 HC RX 258: Performed by: INTERNAL MEDICINE

## 2019-05-03 PROCEDURE — 6370000000 HC RX 637 (ALT 250 FOR IP): Performed by: INTERNAL MEDICINE

## 2019-05-03 PROCEDURE — 94761 N-INVAS EAR/PLS OXIMETRY MLT: CPT

## 2019-05-03 PROCEDURE — 36592 COLLECT BLOOD FROM PICC: CPT

## 2019-05-03 PROCEDURE — 2580000003 HC RX 258: Performed by: PHYSICIAN ASSISTANT

## 2019-05-03 PROCEDURE — 94640 AIRWAY INHALATION TREATMENT: CPT

## 2019-05-03 PROCEDURE — 36430 TRANSFUSION BLD/BLD COMPNT: CPT

## 2019-05-03 PROCEDURE — 2700000000 HC OXYGEN THERAPY PER DAY

## 2019-05-03 PROCEDURE — 0DJ08ZZ INSPECTION OF UPPER INTESTINAL TRACT, VIA NATURAL OR ARTIFICIAL OPENING ENDOSCOPIC: ICD-10-PCS | Performed by: INTERNAL MEDICINE

## 2019-05-03 PROCEDURE — 86850 RBC ANTIBODY SCREEN: CPT

## 2019-05-03 PROCEDURE — 6360000002 HC RX W HCPCS: Performed by: INTERNAL MEDICINE

## 2019-05-03 PROCEDURE — 85014 HEMATOCRIT: CPT

## 2019-05-03 PROCEDURE — 80053 COMPREHEN METABOLIC PANEL: CPT

## 2019-05-03 PROCEDURE — 7100000010 HC PHASE II RECOVERY - FIRST 15 MIN: Performed by: INTERNAL MEDICINE

## 2019-05-03 PROCEDURE — 3609017100 HC EGD: Performed by: INTERNAL MEDICINE

## 2019-05-03 PROCEDURE — 99152 MOD SED SAME PHYS/QHP 5/>YRS: CPT | Performed by: INTERNAL MEDICINE

## 2019-05-03 PROCEDURE — 99223 1ST HOSP IP/OBS HIGH 75: CPT | Performed by: INTERNAL MEDICINE

## 2019-05-03 PROCEDURE — 2709999900 HC NON-CHARGEABLE SUPPLY: Performed by: INTERNAL MEDICINE

## 2019-05-03 PROCEDURE — 2060000000 HC ICU INTERMEDIATE R&B

## 2019-05-03 PROCEDURE — P9016 RBC LEUKOCYTES REDUCED: HCPCS

## 2019-05-03 PROCEDURE — 86900 BLOOD TYPING SEROLOGIC ABO: CPT

## 2019-05-03 PROCEDURE — C9113 INJ PANTOPRAZOLE SODIUM, VIA: HCPCS | Performed by: INTERNAL MEDICINE

## 2019-05-03 PROCEDURE — 7100000011 HC PHASE II RECOVERY - ADDTL 15 MIN: Performed by: INTERNAL MEDICINE

## 2019-05-03 PROCEDURE — 86923 COMPATIBILITY TEST ELECTRIC: CPT

## 2019-05-03 PROCEDURE — 86901 BLOOD TYPING SEROLOGIC RH(D): CPT

## 2019-05-03 RX ORDER — PANTOPRAZOLE SODIUM 40 MG/10ML
40 INJECTION, POWDER, LYOPHILIZED, FOR SOLUTION INTRAVENOUS 2 TIMES DAILY
Status: DISCONTINUED | OUTPATIENT
Start: 2019-05-03 | End: 2019-05-08 | Stop reason: HOSPADM

## 2019-05-03 RX ORDER — 0.9 % SODIUM CHLORIDE 0.9 %
250 INTRAVENOUS SOLUTION INTRAVENOUS ONCE
Status: COMPLETED | OUTPATIENT
Start: 2019-05-03 | End: 2019-05-03

## 2019-05-03 RX ORDER — ONDANSETRON 2 MG/ML
4 INJECTION INTRAMUSCULAR; INTRAVENOUS EVERY 6 HOURS PRN
Status: DISCONTINUED | OUTPATIENT
Start: 2019-05-03 | End: 2019-05-08 | Stop reason: HOSPADM

## 2019-05-03 RX ORDER — FENTANYL CITRATE 50 UG/ML
INJECTION, SOLUTION INTRAMUSCULAR; INTRAVENOUS PRN
Status: DISCONTINUED | OUTPATIENT
Start: 2019-05-03 | End: 2019-05-03 | Stop reason: ALTCHOICE

## 2019-05-03 RX ORDER — ALBUTEROL SULFATE 90 UG/1
2 AEROSOL, METERED RESPIRATORY (INHALATION) EVERY 6 HOURS PRN
Status: DISCONTINUED | OUTPATIENT
Start: 2019-05-03 | End: 2019-05-08 | Stop reason: HOSPADM

## 2019-05-03 RX ORDER — MORPHINE SULFATE 4 MG/ML
2 INJECTION, SOLUTION INTRAMUSCULAR; INTRAVENOUS
Status: DISCONTINUED | OUTPATIENT
Start: 2019-05-03 | End: 2019-05-08 | Stop reason: HOSPADM

## 2019-05-03 RX ORDER — MIDAZOLAM HYDROCHLORIDE 5 MG/ML
INJECTION INTRAMUSCULAR; INTRAVENOUS PRN
Status: DISCONTINUED | OUTPATIENT
Start: 2019-05-03 | End: 2019-05-03 | Stop reason: ALTCHOICE

## 2019-05-03 RX ORDER — SODIUM CHLORIDE 0.9 % (FLUSH) 0.9 %
10 SYRINGE (ML) INJECTION EVERY 12 HOURS SCHEDULED
Status: DISCONTINUED | OUTPATIENT
Start: 2019-05-03 | End: 2019-05-08 | Stop reason: HOSPADM

## 2019-05-03 RX ORDER — RIVASTIGMINE 4.6 MG/24H
1 PATCH, EXTENDED RELEASE TRANSDERMAL DAILY
Status: DISCONTINUED | OUTPATIENT
Start: 2019-05-03 | End: 2019-05-07

## 2019-05-03 RX ORDER — NITROGLYCERIN 0.4 MG/1
0.4 TABLET SUBLINGUAL EVERY 5 MIN PRN
Status: DISCONTINUED | OUTPATIENT
Start: 2019-05-03 | End: 2019-05-08 | Stop reason: HOSPADM

## 2019-05-03 RX ORDER — SODIUM CHLORIDE 9 MG/ML
INJECTION, SOLUTION INTRAVENOUS CONTINUOUS
Status: DISCONTINUED | OUTPATIENT
Start: 2019-05-03 | End: 2019-05-07

## 2019-05-03 RX ORDER — SODIUM CHLORIDE 0.9 % (FLUSH) 0.9 %
10 SYRINGE (ML) INJECTION PRN
Status: DISCONTINUED | OUTPATIENT
Start: 2019-05-03 | End: 2019-05-08 | Stop reason: HOSPADM

## 2019-05-03 RX ORDER — ACETAMINOPHEN 325 MG/1
650 TABLET ORAL EVERY 4 HOURS PRN
Status: DISCONTINUED | OUTPATIENT
Start: 2019-05-03 | End: 2019-05-08 | Stop reason: HOSPADM

## 2019-05-03 RX ADMIN — Medication 10 ML: at 20:50

## 2019-05-03 RX ADMIN — SODIUM CHLORIDE: 9 INJECTION, SOLUTION INTRAVENOUS at 04:35

## 2019-05-03 RX ADMIN — SODIUM CHLORIDE 250 ML: 9 INJECTION, SOLUTION INTRAVENOUS at 15:08

## 2019-05-03 RX ADMIN — PANTOPRAZOLE SODIUM 40 MG: 40 INJECTION, POWDER, LYOPHILIZED, FOR SOLUTION INTRAVENOUS at 20:50

## 2019-05-03 RX ADMIN — GLYCOPYRROLATE AND FORMOTEROL FUMARATE 2 PUFF: 9; 4.8 AEROSOL, METERED RESPIRATORY (INHALATION) at 20:07

## 2019-05-03 RX ADMIN — SODIUM CHLORIDE: 9 INJECTION, SOLUTION INTRAVENOUS at 14:30

## 2019-05-03 RX ADMIN — PANTOPRAZOLE SODIUM 40 MG: 40 INJECTION, POWDER, LYOPHILIZED, FOR SOLUTION INTRAVENOUS at 09:32

## 2019-05-03 RX ADMIN — Medication 10 ML: at 09:33

## 2019-05-03 NOTE — CARE COORDINATION
Case Management Assessment  Initial Evaluation    Date/Time of Evaluation: 5/3/2019 11:28 AM  Assessment Completed by: Alireza Bobby    Patient Name: Yun Celis  YOB: 1950  Diagnosis: Upper GI bleed [K92.2]  Date / Time: 5/3/2019  4:05 AM  Admission status/Date: Inpatient 5/3/2019  Chart Reviewed: Yes      Patient Yajaira Hood: Yes (drowsy)  Family Interviewed:  No      Hospitalization in the last 30 days:  Yes 4/9-4/11    Contacts  :   Gi Pavon  Relationship to Patient:  spouse  Phone Number:   [phone]  Alternate Contact:   Say Yuen  Relationship to Patient:   sister  Phone Number:   [phone]    Met with: patient    Current PCP: SIMONA Aguilar CNP    Financial  Medicaid  Precert required for SNF : N        3 night stay required - NA    ADLS  Support Systems: Children, Spouse/Significant Other, Family Members  Transportation: EMS transportation    Meal Preparation: TF-Peg ([employer]), refuses to use the feeding tube at home    [address]: jerel w/sp  Steps: 3  Plans to Return to Present Housing: Yes  Other Identified Issues: [address]  Currently active with [address] : Yes - [facility] (refuses visits, missed visits)  Type of Home Care Services: None  Passport/Waiver : No  :                      Phone Number:    Passport/Waiver Services: NA          Durable Medical Equipment   DME Provider: [facility]  Equipment: Walker__Cane__RTS__ BSC__Shower Chair__  02_X_ HHN_X_ CPAP__  BiPap__  Hospital Bed__ W/C___ Other__________      Has Home O2 in place on admit: yes  Informed of need to bring portable home O2 tank on day of discharge for nursing to connect prior to leaving:  Yes (EMS transport home)  Verbalized agreement/Understanding:   NA    Community Service Affiliation  Dialysis:  No    · Name:  · Location  · Dialysis Schedule:  · Phone:   · Fax:     Outpatient PT/OT: No    Cancer Center: No     CHF Clinic: No Pulmonary Rehab: No  Pain Clinic: No  [facility] Mental Health: No    Wound Clinic: No     Other: NA    DISCHARGE PLAN: Chart reviewed. Met with pt at bedside and explained the role of the CM. Plan: Pt wants to return home. EMS transport to home. +[facility] SN/PT/OT/SW, per [facility] they have only been able to make contact with patient 3 times since their original consult. Pt refuses visits or is not home due to frequent returns to ED. He refuses to use Peg tube for feedings and eats food against medical advise at home. He does not wish to be admitted to a SNF. [facility] will resume care on DC. +eCOC, +CM following. Explained Case Management role/services.

## 2019-05-03 NOTE — H&P
Hospital Medicine History & Physical      PCP: SIMONA Lundy CNP    Date of Admission: 5/3/2019    Date of Service: Pt seen/examined on 5/3/2019    Chief Complaint:  Coffee ground emesis    History Of Present Illness: The patient is a 80 y.o. male with COPD, dementia, esophageal stricture s/p multiple dilations, and prior GI bleed who presented to [facility] ED with complaint of hematemesis. Patient reports that he was at home when he vomited blood. States he hasnt had any further episodes of it. States he did not have any vomit but felt that it was all blood. He denies any associated pain and did not believe he had any BRBPR or dark stools. He is otherwise a poor historian and does not provide any further information. Per the admitting provider, it was reported that the patient was hypotensive in the ED and a CVC was placed with IVF which improved his BP. Past Medical History:        Diagnosis Date    [facility]) [facility]     Dementia     Emphysema of lung ([facility]     Esophageal stricture     Nondependent tobacco use disorder        Past Surgical History:        Procedure Laterality Date    UPPER GASTROINTESTINAL ENDOSCOPY N/A 12/12/2018    EGD DILATION BALLOON performed by Madhu Dow MD at [place] [address] N/A 12/13/2018    EGD DILATION BALLOON performed by Madhu Dow MD at [place] [address]  12/23/2018    foreign body removal, balloon dilation    UPPER GASTROINTESTINAL ENDOSCOPY N/A 12/23/2018    EGD FOREIGN BODY REMOVAL performed by Madhu Dow MD at [place] [address]  12/23/2018    EGD DILATION BALLOON performed by Madhu Dow MD at [place] [address] 1/30/2019    EGD W/20X8 STENT PLACEMENT W/ANES.  (9:30) performed by [facility]hu Dow MD at [facility] ENDOSCOPY  1/30/2019    EGD DILATION BALLOON performed by Elizabeth Landry MD at [address]  1/30/2019    EGD BIOPSY performed by Elizabeth Landry MD at [address]  03/01/2019    Esophageal stent removed    UPPER GASTROINTESTINAL ENDOSCOPY N/A 3/1/2019    EGD DILATION BALLOON performed by Elizabeth Landry MD at [address]  3/1/2019    EGD STENT REMOVAL performed by Elizabeth Landry MD at [address] N/A 3/4/2019    EGD BIOPSY performed by Trinity Hurt DO at [address]  3/4/2019    EGD SUBMUCOSAL/BOTOX INJECTION performed by Trinity Hurt DO at [address]  3/4/2019    EGD DILATION BALLOON performed by Trinity Hurt DO at [address]  04/02/2019    EGD with PEG placement    UPPER GASTROINTESTINAL ENDOSCOPY N/A 4/10/2019    EGD DILATION BALLOON [facility]urt [facility]       Medications Prior to Admission:    Prior to Admission medications    Medication Sig Start Date End Date Taking?  Authorizing Provider   Nutritional Supplements (JEVITY 1 KENNEY/FIBER) LIQD 1 Can by Gastrostomy Tube route 4 times daily 4/4/19   Keon Stephenson MD   magnesium oxide (MAG-OX) 400 (241.3 Mg) MG TABS tablet Take 1 tablet by mouth daily 3/8/19   Keon Stephenson MD   diltiazem (DILACOR XR) 240 MG extended release capsule Take 240 mg by mouth daily    Historical Provider, MD   pantoprazole sodium (PROTONIX) 40 MG PACK packet Take 1 packet by mouth 2 times daily (before meals) 12/25/18   Jason Contreras MD   aspirin 81 MG tablet Take 81 mg by mouth daily    Historical Provider, MD atorvastatin (LIPITOR) 10 MG tablet Take 10 mg by mouth nightly    Historical Provider, MD   citalopram (CELEXA) 10 MG tablet Take 10 mg by mouth daily    Historical Provider, MD   docusate (COLACE) 50 MG/5ML liquid Take 100 mg by mouth 2 times daily    Historical Provider, MD   gabapentin (NEURONTIN) 400 MG capsule Take 400 mg by mouth 3 times daily. Daksha Kelly Historical Provider, MD   ipratropium (ATROVENT) 0.02 % nebulizer solution Take 1.25 mg by nebulization every 4 hours as needed for Wheezing    Historical Provider, MD   nitroGLYCERIN (NITROSTAT) 0.4 MG SL tablet Place 0.4 mg under the tongue every 5 minutes as needed for Chest pain up to max of 3 total doses. If no relief after 1 dose, call 911. Historical Provider, MD   albuterol sulfate  (90 Base) MCG/ACT inhaler Inhale 2 puffs into the lungs every 6 hours as needed for Wheezing    Historical Provider, MD   rivastigmine (EXELON) 4.6 MG/24HR Place 1 patch onto the skin daily    Historical Provider, MD   umeclidinium-vilanterol (ANORO ELLIPTA) 62.5-25 MCG/INH AEPB inhaler Inhale 1 puff into the lungs daily 12/12/18   Historical Provider, MD   sucralfate (CARAFATE) 1 GM/10ML suspension Take 1 g by mouth 4 times daily 12/11/18   Historical Provider, MD   tamsulosin (FLOMAX) 0.4 MG capsule Take 0.4 mg by mouth daily    Historical Provider, MD       Allergies:  Heparin    Social History:  The patient currently lives at home    TOBACCO:   reports that he has been smoking cigarettes. He has a 50.00 pack-year smoking history. He has never used smokeless tobacco.  ETOH:   reports that he does not drink alcohol.       Family History:   Positive as follows:        Problem Relation Age of Onset    Heart Disease Mother     Diabetes Mother     Cancer Father        REVIEW OF SYSTEMS:     Constitutional: Negative for fever   HENT: Negative for sore throat   Eyes: Negative for redness   Respiratory: Negative  for dyspnea, cough   Cardiovascular: Negative for chest controlled  - No on AC     Dementia   - supportive care     Dysphagia   - hx of esophageal strictures with dilations   - has PEG, continue TF once cleared for diet     DVT Prophylaxis: SCD  Diet: Diet NPO Effective Now Exceptions are: Ice Chips   Code Status: Full Code      Mirian Negron PA-C  5/3/2019 9:14 AM     Agree wit deni Coronel M.D.

## 2019-05-03 NOTE — PROGRESS NOTES
4 Eyes Skin Assessment     The patient is being assess for   Admission    I agree that 2 RN's have performed a thorough Head to Toe Skin Assessment on the patient. ALL assessment sites listed below have been assessed. Areas assessed by both nurses:   [x]   Head, Face, and Ears   [x]   Shoulders, Back, and Chest, Abdomen  [x]   Arms, Elbows, and Hands   [x]   Coccyx, Sacrum, and Ischium  [x]   Legs, Feet, and Heels        Scattered bruising and blanchable redness to coccyx. **SHARE this note so that the co-signing nurse is able to place an eSignature**    Co-signer eSignature: Electronically signed by Christy Baugh RN on 5/3/19 at 6:19 AM    Does the Patient have Skin Breakdown?   No          Zion Prevention initiated:  Yes   Wound Care Orders initiated:  NA      [facility] nurse consulted for Pressure Injury (Stage 3,4, Unstageable, DTI, NWPT, Complex wounds)and New or Established Ostomies:  NA      Primary Nurse eSignature: Electronically signed by Dahlia Echevarria RN on 5/3/19 at 6:16 AM

## 2019-05-03 NOTE — PROGRESS NOTES
LAB CALLED WITH CRITICAL LAB  VALUE: Hemoglobin- 6.6, hematocrit- 20.9  NIK Phillips notified. New orders placed.

## 2019-05-03 NOTE — FLOWSHEET NOTE
05/03/19 0815   Vital Signs   Temp 99.3 °F (37.4 °C)   Temp Source Axillary   Pulse 101   Resp 16   /72   BP Location Left Arm   BP Upper/Lower Lower   MAP (mmHg) 86   Level of Consciousness 0   MEWS Score 2   Patient Currently in Pain No   Pain Assessment   Pain Assessment Respiratory Rate >10   Oxygen Therapy   SpO2 95 %   O2 Device Nasal cannula   O2 Flow Rate (L/min) 2 L/min     Shift assessment completed, see flow sheet. Pt alert, but confused with garbled speech. No complaints at this time. ST on monitor. Respirations are easy, even, and unlabored. Bilateral lung sounds diminished. Patient with large amounts of thick brown sputum. Abdomen soft, but tender. PEG WNL. CVC WNL with 0.9% sodium chloride infusing @ 150 mL/hr. Call light and bedside table within reach. Bed alarm on. Will continue to monitor.

## 2019-05-03 NOTE — H&P
History and Physical / Pre-Sedation Assessment    Patient:  Tiara Faicrhild   :   1950     Intended Procedure:  EGD    HPI: 46-year-old male with history of COPD, emphysema, and refractory esophageal stricture s/p multiple esophageal dilations, and failed esophageal stent and kenalog injection now admitted with anemia and GI Bleed. He underwent EGD with PEG on 19        Past Medical History:   Diagnosis Date    [facility]chronic obstructive [facility] disease) ([facility]     Dementia     Emphysema of lung ([facility]     Esophageal stricture     Nondependent tobacco use disorder      Past Surgical History:   Procedure Laterality Date    UPPER GASTROINTESTINAL ENDOSCOPY N/A 2018    EGD DILATION BALLOON performed by Jayce Head MD at [place] [address] N/A 2018    EGD DILATION BALLOON performed by Jayce Head MD at [place] [address]  2018    foreign body removal, balloon dilation    UPPER GASTROINTESTINAL ENDOSCOPY N/A 2018    EGD FOREIGN BODY REMOVAL performed by Jayce Head MD at [place] [address]  2018    EGD DILATION BALLOON performed by Jayce Head MD at [place] [address] 2019    EGD W/20X8 STENT PLACEMENT W/ANES.  (9:30) performed by Jayce Head MD at [place] [address]  2019    EGD DILATION BALLOON performed by Jayce Head MD at [place] [address]  2019    EGD BIOPSY performed by Jayce Head MD at [place] [address]  2019    Esophageal stent removed    UPPER GASTROINTESTINAL ENDOSCOPY N/A 3/1/2019    EGD DILATION BALLOON performed by Jayce Head MD at [address] [place] [address] GASTROINTESTINAL ENDOSCOPY  3/1/2019    EGD STENT REMOVAL performed by Carol Neves MD at [address] N/A 3/4/2019    EGD BIOPSY performed by Meagan Dougherty DO at [address]  3/4/2019    EGD SUBMUCOSAL/BOTOX INJECTION performed by Meagan Dougherty DO at [address]  3/4/2019    EGD [facility]  04/02/[facility]    EGD with PEG placement    UPPER GASTROINTESTINAL ENDOSCOPY N/A 4/10/2019    [facility] [address] [facility]       Medications reviewed  Prior to Admission medications    Medication Sig Start Date End Date Taking? Authorizing Provider   Nutritional Supplements (JEVITY 1 KENNEY/FIBER) LIQD 1 Can by Gastrostomy Tube route 4 times daily 4/4/19   True James MD   magnesium oxide (MAG-OX) 400 (241.3 Mg) MG TABS tablet Take 1 tablet by mouth daily 3/8/19   True James MD   diltiazem (DILACOR XR) 240 MG extended release capsule Take 240 mg by mouth daily    Historical Provider, MD   pantoprazole sodium (PROTONIX) 40 MG PACK packet Take 1 packet by mouth 2 times daily (before meals) 12/25/18   Delano Franklin MD   aspirin 81 MG tablet Take 81 mg by mouth daily    Historical Provider, MD   atorvastatin (LIPITOR) 10 MG tablet Take 10 mg by mouth nightly    Historical Provider, MD   citalopram (CELEXA) 10 MG tablet Take 10 mg by mouth daily    Historical Provider, MD   docusate (COLACE) 50 MG/5ML liquid Take 100 mg by mouth 2 times daily    Historical Provider, MD   gabapentin (NEURONTIN) 400 MG capsule Take 400 mg by mouth 3 times daily. Efra Guthrie     Historical Provider, MD   ipratropium (ATROVENT) 0.02 % nebulizer solution Take 1.25 mg by nebulization every 4 hours as needed for Wheezing    Historical Provider, MD nitroGLYCERIN (NITROSTAT) 0.4 MG SL tablet Place 0.4 mg under the tongue every 5 minutes as needed for Chest pain up to max of 3 total doses. If no relief after 1 dose, call 911. Historical Provider, MD   albuterol sulfate  (90 Base) MCG/ACT inhaler Inhale 2 puffs into the lungs every 6 hours as needed for Wheezing    Historical Provider, MD   rivastigmine (EXELON) 4.6 MG/24HR Place 1 patch onto the skin daily    Historical Provider, MD   umeclidinium-vilanterol (ANORO ELLIPTA) 62.5-25 MCG/INH AEPB inhaler Inhale 1 puff into the lungs daily 12/12/18   Historical Provider, MD   sucralfate (CARAFATE) 1 GM/10ML suspension Take 1 g by mouth 4 times daily 12/11/18   Historical Provider, MD   tamsulosin (FLOMAX) 0.4 MG capsule Take 0.4 mg by mouth daily    Historical Provider, MD        Allergies: Allergies   Allergen Reactions    Heparin      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, 2018       Nurses notes reviewed and agreed. Physical Exam:  Vital Signs: BP (!) 117/34   Pulse 104   Temp 98.7 °F (37.1 °C) (Temporal)   Resp 16   Ht 5' 5\" (1.651 m)   Wt 108 lb 4.8 oz (49.1 kg)   SpO2 99%   BMI 18.02 kg/m²    Airway: Mallampati: II (soft palate, uvula, fauces visible)  Pulmonary:Normal  Cardiac:Normal  Abdomen:Normal    Pre-Procedure Assessment / Plan:  ASA: Class 3 - A patient with severe systemic disease that limits activity but is not incapacitating  Level of Sedation Plan:No sedation  Post Procedure plan: Return to same level of care    I assessed the patient and find that the patient is in satisfactory condition to proceed with the planned procedure and sedation plan. I have explained the risk, benefits, and alternatives to the procedure; the patient's wife understands and agrees to proceed.        Oscar Degroot  5/3/2019

## 2019-05-03 NOTE — PROGRESS NOTES
Bedside report given to [facility], [facility]. Patient resting with eyes closed, no s/s of distress noted. Care transferred.

## 2019-05-03 NOTE — OP NOTE
Christal Jameson   5/3/2019  Esophagogastroduodenoscopy  A pre-procedure re-evaluation was performed immediately prior to the procedure. Preprocedure Dx: anemia, esophageal stricture  Postprocedure Dx: severe esophagitis, no active bleeding  Medications: Procedural sedation with Versed & Fentanyl  Complications: None  Estimated Blood Loss: <5cc  Specimens: were not obtained  Recommendation  1. Continue supportive care  2. PPI BID  3. Carafate 1gm QID  4. Monitor Hgb  5. Restart TF per PEG  6.  Observe for signs of bleeding    Jeanne Burciaga

## 2019-05-03 NOTE — PROGRESS NOTES
Bedside report given to [facility], RACHEL RN. Patient in stable condition. Care transferred. SHAISTA PinedoN, RN.

## 2019-05-03 NOTE — PROGRESS NOTES
Admission assessment completed, see flow sheet. Patient is alert and oriented x 4. Respirations are easy and unlabored. Patient denies needs at this time. Side rails up x 2, and bed in low position. Call light is within reach. Will continue to monitor.

## 2019-05-03 NOTE — PROGRESS NOTES
1 unit PRBCs infusing at this time. This RN remained with patient for first 15 minutes of blood transfusion. Patient tolerating well. Patient resting in bed with eyes closed. Respirations e/e. No signs of distress noted. Will continue to monitor. NATHAN Altman, RN.

## 2019-05-03 NOTE — PROGRESS NOTES
RESPIRATORY THERAPY ASSESSMENT    Name:  Woody Rome  Medical Record Number:  [id number]  Age: 80 y.o. Gender: male  : 1950  Today's Date:  5/3/2019  Room:  /0312-01    Assessment     Is the patient being admitted for a COPD or Asthma exacerbation? No   (If yes the patient will be seen every 4 hours for the first 24 hours and then reassessed)    Patient Admission Diagnosis      Allergies  Allergies   Allergen Reactions    Heparin      Pt was HIT positive in        Minimum Predicted Vital Capacity:     925          Actual Vital Capacity:      Patient refused              Pulmonary History:COPD and emphysema  Home Oxygen Therapy:  room air  Home Respiratory Therapy:Albuterol, Ipratropium Bromide and Umeclidinium Bromide/Vilanterol   Current Respiratory Therapy:  Albuterol, atrovent PRN, anoro BID          Respiratory Severity Index(RSI)   Patients with orders for inhalation medications, oxygen, or any therapeutic treatment modality will be placed on Respiratory Protocol. They will be assessed with the first treatment and at least every 72 hours thereafter. The following severity scale will be used to determine frequency of treatment intervention.     Smoking History: Pulmonary Disease or Smoking History, Greater than 15 pack year = 2    Social History  Social History     Tobacco Use    Smoking status: Current Every Day Smoker     Packs/day: 1.00     Years: 50.00     Pack years: 50.00     Types: Cigarettes    Smokeless tobacco: Never Used    Tobacco comment: pt states d/t hospital stays he hasnt smoke in 5 months but will continue eventually- educated with no interest in stopping   Substance Use Topics    Alcohol use: No    Drug use: No       Recent Surgical History: None = 0  Past Surgical History  Past Surgical History:   Procedure Laterality Date    UPPER GASTROINTESTINAL ENDOSCOPY N/A 2018    EGD DILATION BALLOON performed by Cesar Crawford MD at [address] [place] [address] patients when ANY of the following criteria are met  a. Incognizant or uncooperative          b. Patients treated with HHN at Home        c. Unable to demonstrate proper use of MDI with spacer     d. RR > 30 bpm   5. Bronchodilators will be delivered via Metered Dose Inhaler (MDI), HHN, Aerogen to intubated patients on mechanical ventilation. 6. Inhalation medication orders will be delivered and/or substituted as outlined below. Aerosolized Medications Ordering and Administration Guidelines:    1. All Medications will be ordered by a physician, and their frequency and/or modality will be adjusted as defined by the patients Respiratory Severity Index (RSI) score. 2. If the patient does not have documented COPD, consider discontinuing anticholinergics when RSI is less than 9.  3. If the bronchospasm worsens (increased RSI), then the bronchodilator frequency can be increased to a maximum of every 4 hours. If greater than every 4 hours is required, the physician will be contacted. 4. If the bronchospasm improves, the frequency of the bronchodilator can be decreased, based on the patient's RSI, but not less than home treatment regimen frequency. 5. Bronchodilator(s) will be discontinued if patient has a RSI less than 9 and has received no scheduled or as needed treatment for 72  Hrs. Patients Ordered on a Mucolytic Agent:    1. Must always be administered with a bronchodilator. 2. Discontinue if patient experiences worsened bronchospasm, or secretions have lessened to the point that the patient is able to clear them with a cough. Anti-inflammatory and Combination Medications:    1. If the patient lacks prior history of lung disease, is not using inhaled anti-inflammatory medication at home, and lacks wheezing by examination or by history for at least 24 hours, contact physician for possible discontinuation.

## 2019-05-04 LAB
BLOOD BANK DISPENSE STATUS: NORMAL
BLOOD BANK PRODUCT CODE: NORMAL
BPU ID: NORMAL
DESCRIPTION BLOOD BANK: NORMAL
GLUCOSE BLD-MCNC: 73 MG/DL (ref 70–99)
GLUCOSE BLD-MCNC: 74 MG/DL (ref 70–99)
GLUCOSE BLD-MCNC: 78 MG/DL (ref 70–99)
GLUCOSE BLD-MCNC: 80 MG/DL (ref 70–99)
GLUCOSE BLD-MCNC: 80 MG/DL (ref 70–99)
GLUCOSE BLD-MCNC: 92 MG/DL (ref 70–99)
HCT VFR BLD CALC: 20.8 % (ref 40.5–52.5)
HCT VFR BLD CALC: 21.8 % (ref 40.5–52.5)
HCT VFR BLD CALC: 22.9 % (ref 40.5–52.5)
HCT VFR BLD CALC: 24.2 % (ref 40.5–52.5)
HEMOGLOBIN: 6.8 G/DL (ref 13.5–17.5)
HEMOGLOBIN: 7 G/DL (ref 13.5–17.5)
HEMOGLOBIN: 7.5 G/DL (ref 13.5–17.5)
HEMOGLOBIN: 8 G/DL (ref 13.5–17.5)
PERFORMED ON: NORMAL

## 2019-05-04 PROCEDURE — 6370000000 HC RX 637 (ALT 250 FOR IP): Performed by: INTERNAL MEDICINE

## 2019-05-04 PROCEDURE — 6360000002 HC RX W HCPCS: Performed by: INTERNAL MEDICINE

## 2019-05-04 PROCEDURE — C9113 INJ PANTOPRAZOLE SODIUM, VIA: HCPCS | Performed by: INTERNAL MEDICINE

## 2019-05-04 PROCEDURE — 94761 N-INVAS EAR/PLS OXIMETRY MLT: CPT

## 2019-05-04 PROCEDURE — 94640 AIRWAY INHALATION TREATMENT: CPT

## 2019-05-04 PROCEDURE — 2580000003 HC RX 258: Performed by: INTERNAL MEDICINE

## 2019-05-04 PROCEDURE — 2700000000 HC OXYGEN THERAPY PER DAY

## 2019-05-04 PROCEDURE — 85014 HEMATOCRIT: CPT

## 2019-05-04 PROCEDURE — 2060000000 HC ICU INTERMEDIATE R&B

## 2019-05-04 PROCEDURE — 86923 COMPATIBILITY TEST ELECTRIC: CPT

## 2019-05-04 PROCEDURE — 85018 HEMOGLOBIN: CPT

## 2019-05-04 PROCEDURE — P9016 RBC LEUKOCYTES REDUCED: HCPCS

## 2019-05-04 PROCEDURE — 36592 COLLECT BLOOD FROM PICC: CPT

## 2019-05-04 RX ORDER — SUCRALFATE ORAL 1 G/10ML
1 SUSPENSION ORAL
Status: DISCONTINUED | OUTPATIENT
Start: 2019-05-04 | End: 2019-05-08 | Stop reason: HOSPADM

## 2019-05-04 RX ORDER — 0.9 % SODIUM CHLORIDE 0.9 %
250 INTRAVENOUS SOLUTION INTRAVENOUS ONCE
Status: COMPLETED | OUTPATIENT
Start: 2019-05-04 | End: 2019-05-04

## 2019-05-04 RX ADMIN — GLYCOPYRROLATE AND FORMOTEROL FUMARATE 2 PUFF: 9; 4.8 AEROSOL, METERED RESPIRATORY (INHALATION) at 07:26

## 2019-05-04 RX ADMIN — Medication 10 ML: at 22:15

## 2019-05-04 RX ADMIN — Medication 10 ML: at 09:52

## 2019-05-04 RX ADMIN — SUCRALFATE 1 G: 1 SUSPENSION ORAL at 17:28

## 2019-05-04 RX ADMIN — PANTOPRAZOLE SODIUM 40 MG: 40 INJECTION, POWDER, LYOPHILIZED, FOR SOLUTION INTRAVENOUS at 09:51

## 2019-05-04 RX ADMIN — SODIUM CHLORIDE: 9 INJECTION, SOLUTION INTRAVENOUS at 19:16

## 2019-05-04 RX ADMIN — PANTOPRAZOLE SODIUM 40 MG: 40 INJECTION, POWDER, LYOPHILIZED, FOR SOLUTION INTRAVENOUS at 22:25

## 2019-05-04 RX ADMIN — SUCRALFATE 1 G: 1 SUSPENSION ORAL at 12:14

## 2019-05-04 RX ADMIN — SODIUM CHLORIDE 250 ML: 9 INJECTION, SOLUTION INTRAVENOUS at 09:52

## 2019-05-04 RX ADMIN — SODIUM CHLORIDE: 9 INJECTION, SOLUTION INTRAVENOUS at 05:10

## 2019-05-04 RX ADMIN — GLYCOPYRROLATE AND FORMOTEROL FUMARATE 2 PUFF: 9; 4.8 AEROSOL, METERED RESPIRATORY (INHALATION) at 19:12

## 2019-05-04 ASSESSMENT — PAIN SCALES - GENERAL
PAINLEVEL_OUTOF10: 0

## 2019-05-04 NOTE — PLAN OF CARE
Nutrition Problem: Inadequate oral intake  Intervention: Food and/or Nutrient Delivery: Continue NPO, Start Tube Feeding  Nutritional Goals: pt will adhere NPO order and will tolerate bolus feeds per PEG tube and his body weight will be > 110# and he will be free of N/v/D

## 2019-05-04 NOTE — PROGRESS NOTES
Shift assessment completed. Patient received one unit of blood due to low H&H. Patient is alert in bed watching TV. Vital signs stable, morning medication given. Patient refusing tube feeds, currently waiting Dietary consult. Patients wife and sister called this morning to check on patient. Wife is requesting patient go to the [facility] for 30 days so that she can rest and be taught how to properly care for the patient. Discharge planning has been contacted, will try to have PT evaluate patient tomorrow for possible placement. Call light within reach, bedside table available for patient.

## 2019-05-04 NOTE — PROCEDURES
[address]                 [address] [address]                                 PROCEDURE NOTE    PATIENT NAME: Giuseppe Steward                       :        1950  MED REC NO:   [id number]                          ROOM:       K08  ACCOUNT NO:   [de-identified]                           ADMIT DATE: 2019  PROVIDER:     Hillary Coronado MD    DATE OF PROCEDURE:  2019    REFERRING PROVIDER:  True James MD    REASON FOR REFERRAL:  1.  GI bleed. 2.  Anemia. HISTORY OF PRESENT ILLNESS:  This is a 72-year-old male with history of  COPD, emphysema, refractory esophageal strictures status post multiple  esophageal dilations and failed esophageal stent as well as Kenalog  injection, now admitted with anemia and GI bleed. He is a poor  historian due to underlying dementia. He did have episode of  coffee-ground emesis and hypotension. He was noted to have a decline in  hemoglobin from baseline of 9 to 10 down to 6.6 this morning. He has  received transfusion. A CT of the abdomen did show a suspected gastric  outlet obstruction. BUN has also risen concerning for upper GI bleed. A diagnostic and therapeutic EGD is being performed. LABORATORY DATA:  White blood cell count 11.4, hemoglobin 6.6,  hematocrit 20.9. Sodium 138, potassium 4.5, chloride 101, bicarb 29,  BUN 48, creatinine 1.0, glucose 111. Liver profile, alk-phos 52, AST  21, ALT 14, total protein 5.2, albumin 2.80, total bilirubin 0.4. IMAGIN.  CT scan of the abdomen and pelvis, moderate circumferential wall  thickening in the distal esophageus likely due to esophagitis. 2.  Unchanged 5.8-cm infrarenal abdominal aortic aneurysm status post  aortobiiliac _____ graft stent. 3.  Unchanged bilateral simple and complex renal cyst.    MEDICATIONS:  Versed 2 mg and fentanyl 50 mcg.     PROCEDURE IN DETAIL:  Informed consent was obtained after discussing the  risks, MD    D: 05/03/2019 16:19:58       T: 05/04/2019 0:14:23     [id number]  Job#: [id number]     Doc#: [id number]    CC:  Coby Cullen CNP

## 2019-05-04 NOTE — PROGRESS NOTES
Pt continues with agitation and verbally aggressive towards staff. Pt informed on rounding time doctors in the morning. Pt expressing concern over diet and not being allowed to eat, ice chips given to pt. Pt educated on diet and the necessity of current diet, pt will need reinforcement.

## 2019-05-04 NOTE — FLOWSHEET NOTE
05/03/19 2036   Vital Signs   Temp 96.3 °F (35.7 °C)   Temp Source Axillary   Pulse 107   Heart Rate Source Monitor   Resp 16   BP (!) 93/59   BP Location Right upper arm   BP Upper/Lower Upper   Patient Position Semi fowlers   Level of Consciousness 0   MEWS Score 3   Patient Currently in Pain Denies   Oxygen Therapy   SpO2 92 %   O2 Device Nasal cannula   O2 Flow Rate (L/min) 2 L/min   Shift assessment complete, see flow sheets. Patient is alert and oriented with episodes of confusion,Pain assessed and no c/o pain and appears to be in no distress. Vital signs stable, call light within reach, will continue to monitor.

## 2019-05-04 NOTE — PLAN OF CARE
Problem: Falls - Risk of:  Goal: Will remain free from falls  Description  Will remain free from falls  5/3/2019 2246 by Epifanio Vidal RN  Outcome: Ongoing  5/3/2019 1635 by Lady Henrik RN  Outcome: Ongoing  Goal: Absence of physical injury  Description  Absence of physical injury  5/3/2019 2246 by Epifanio Vidal RN  Outcome: Ongoing  5/3/2019 1635 by Lady Henrik RN  Outcome: Ongoing     Problem: Risk for Impaired Skin Integrity  Goal: Tissue integrity - skin and mucous membranes  Description  Structural intactness and normal physiological function of skin and  mucous membranes.   5/3/2019 2246 by Epifanio Vidal RN  Outcome: Ongoing  5/3/2019 1635 by Lady Henrik RN  Outcome: Ongoing     Problem: SAFETY  Goal: Free from accidental physical injury  5/3/2019 2246 by Epifanio Vidal RN  Outcome: Ongoing  5/3/2019 1635 by Lady Henrik RN  Outcome: Ongoing  Goal: Free from intentional harm  5/3/2019 2246 by Epifanio Vidal RN  Outcome: Ongoing  5/3/2019 1635 by Lady Henrik RN  Outcome: Ongoing     Problem: DAILY CARE  Goal: Daily care needs are met  5/3/2019 2246 by Epifanio Vidal RN  Outcome: Ongoing  5/3/2019 1635 by Lady Henrik RN  Outcome: Ongoing     Problem: PAIN  Goal: Patient's pain/discomfort is manageable  5/3/2019 2246 by Epifanio Vidal RN  Outcome: Ongoing  5/3/2019 1635 by Lady Henrik RN  Outcome: Ongoing     Problem: SKIN INTEGRITY  Goal: Skin integrity is maintained or improved  5/3/2019 1635 by Lady Henrik RN  Outcome: Ongoing

## 2019-05-04 NOTE — PLAN OF CARE
Problem: Falls - Risk of:  Goal: Will remain free from falls  Description  Will remain free from falls  5/4/2019 1136 by Shyann Black RN  Outcome: Ongoing  5/3/2019 2246 by Cat Huerta RN  Outcome: Ongoing     Problem: Risk for Impaired Skin Integrity  Goal: Tissue integrity - skin and mucous membranes  Description  Structural intactness and normal physiological function of skin and  mucous membranes.   5/4/2019 1136 by Shyann Black RN  Outcome: Ongoing  5/3/2019 2246 by Cat Huerta RN  Outcome: Ongoing     Problem: SAFETY  Goal: Free from accidental physical injury  5/3/2019 2246 by Cat Huerta RN  Outcome: Ongoing     Problem: DAILY CARE  Goal: Daily care needs are met  5/4/2019 1136 by Shyann Black RN  Outcome: Ongoing  5/3/2019 2246 by Cat Huerta RN  Outcome: Ongoing

## 2019-05-04 NOTE — PROGRESS NOTES
Pain:  He reports no pain. Objective:  General Appearance: In no acute distress and not in pain. Vital signs: (most recent): Blood pressure (!) 100/57, pulse 98, temperature 97 °F (36.1 °C), temperature source Axillary, resp. rate 18, height 5' 5\" (1.651 m), weight 113 lb 14.4 oz (51.7 kg), SpO2 96 %. Heart: Normal rate. Regular rhythm. S1 normal and S2 normal.    Abdomen: Abdomen is soft. Bowel sounds are normal.   There is no abdominal tenderness. Assessment & Plan  16-year-old male with COPD, GERD and refractory esophageal stricture s/p multiple esophageal dilations, and failed esophageal stent and kenalog injection now admitted with anemia and GI Bleed.  EGD revealed severe ulcerative esophagitis with PEG in place.  - Continue bid PPI    - Needs liquid Carafate  - F/U daily H/H  - Transfuse as needed   - Will follow    Burt Dickens MD       O) [phone]          Burt Dickens MD  5/4/2019

## 2019-05-04 NOTE — PROGRESS NOTES
Hospitalist Progress Note      PCP: SIMONA Nicole - CNP    Date of Admission: 5/3/2019    Subjective: pt does not want his PEG tube, is requesting to eat something by mouth    Medications:  Reviewed    Infusion Medications    sodium chloride 75 mL/hr at 05/04/19 1214     Scheduled Medications    sodium chloride  250 mL Intravenous Once    sucralfate  1 g Oral 4x Daily AC & HS    pantoprazole  40 mg Intravenous BID    rivastigmine  1 patch Transdermal Daily    sodium chloride flush  10 mL Intravenous 2 times per day    glycopyrrolate-formoterol  2 puff Inhalation BID     PRN Meds: albuterol sulfate HFA, ipratropium, nitroGLYCERIN, sodium chloride flush, acetaminophen, ondansetron, morphine      Intake/Output Summary (Last 24 hours) at 5/4/2019 1439  Last data filed at 5/4/2019 0543  Gross per 24 hour   Intake 2080 ml   Output 200 ml   Net 1880 ml       Physical Exam Performed:    /60   Pulse 83   Temp 97.6 °F (36.4 °C) (Axillary)   Resp 17   Ht 5' 5\" (1.651 m)   Wt 113 lb 14.4 oz (51.7 kg)   SpO2 96%   BMI 18.95 kg/m²     Gen: No distress. Eyes: PERRL. No sclera icterus. No conjunctival injection. ENT: No discharge. Pharynx clear. Neck: Trachea midline. Normal thyroid. Resp: No accessory muscle use. No crackles. No wheezes. No rhonchi. No dullness on percussion. CV: Regular rate. Regular rhythm. No murmur or rub. No edema. GI: Non-tender. Non-distended. No masses. No organomegaly. Normal bowel sounds. No hernia. PEG in place   Skin: Warm and dry. No nodule on exposed extremities. No rash on exposed extremities. Lymph: No cervical LAD. No supraclavicular LAD. M/S: No cyanosis. No joint deformity. No clubbing. Neuro: Awake.  Oriented to self    Labs:   Recent Labs     05/04/19  0005 05/04/19  0630 05/04/19  1223   HGB 7.0* 6.8* 7.5*   HCT 21.8* 20.8* 22.9*     Recent Labs     05/03/19  0557      K 4.5      CO2 29   BUN 48*   CREATININE 1.0   CALCIUM 8.1* Recent Labs     05/03/19  0557   AST 21   ALT 14   BILITOT 0.4   ALKPHOS 52     No results for input(s): INR in the last 72 hours. No results for input(s): Flo Daily in the last 72 hours. Urinalysis:      Lab Results   Component Value Date    NITRU Negative 04/09/2019    WBCUA 3-5 04/09/2019    BACTERIA 1+ 04/01/2019    RBCUA 20-50 04/09/2019    BLOODU LARGE 04/09/2019    SPECGRAV <=1.005 04/09/2019    GLUCOSEU Negative 04/09/2019       Radiology:  No orders to display           Assessment/Plan:    Active Hospital Problems    Diagnosis Date Noted    Severe protein-calorie malnutrition ([facility] [address] [E43] 04/10/2019    Upper GI bleed [K92.2] 04/09/2019         Hematemesis   ABLA  - hx of GIB, last admission on 4/9/19  - had EGD that showed no bleeding, was s/p 15 mm dilatation   - Was discharged home on PPI   - Now with coffee ground emesis, H/H stable from discharge labs, but hypotensive at OSH   - on PPI , GI consulted  - H/H dropped-->transfused 1 unit PRBC on admit  - hb again dropped to 6.8-->blood transfusion 5/4, monitor hb  - Transfuse hgb <7.0   - s/p EGD 5/3 with severe esophagitis     COPD Chronic Hypoxic respiratory failure   - Uses 2L NC O2 at baseline   - NO AE   - Continue home IBD     Chronic Atrial fibrillation   - Rate controlled  - No on AC      Dementia   - supportive care      Dysphagia   - hx of esophageal strictures with dilations   - has PEG, plan is to continue TF once cleared for diet, but pt refusing PEG tube feeds and requesting PEG tube to come off and wants to eat by mouth.       DVT Prophylaxis: SCD  Diet: DIET TUBE FEEDING BOLUS NPO; STANDARD WITH FIBER (Jevity 1.2 ); Gastrostomy; 340 (bolus ); (q 6 hr );  Exceptions are: Ice Chips  Code Status: Full Code    PT/OT Eval Status: ordered    Aaron Quick MD

## 2019-05-05 LAB
GLUCOSE BLD-MCNC: 111 MG/DL (ref 70–99)
GLUCOSE BLD-MCNC: 192 MG/DL (ref 70–99)
GLUCOSE BLD-MCNC: 64 MG/DL (ref 70–99)
GLUCOSE BLD-MCNC: 74 MG/DL (ref 70–99)
GLUCOSE BLD-MCNC: 76 MG/DL (ref 70–99)
HCT VFR BLD CALC: 24.1 % (ref 40.5–52.5)
HCT VFR BLD CALC: 24.5 % (ref 40.5–52.5)
HCT VFR BLD CALC: 25.2 % (ref 40.5–52.5)
HEMOGLOBIN: 8 G/DL (ref 13.5–17.5)
HEMOGLOBIN: 8.2 G/DL (ref 13.5–17.5)
HEMOGLOBIN: 8.4 G/DL (ref 13.5–17.5)
PERFORMED ON: ABNORMAL
PERFORMED ON: NORMAL
PERFORMED ON: NORMAL

## 2019-05-05 PROCEDURE — 94761 N-INVAS EAR/PLS OXIMETRY MLT: CPT

## 2019-05-05 PROCEDURE — 2580000003 HC RX 258: Performed by: INTERNAL MEDICINE

## 2019-05-05 PROCEDURE — 85018 HEMOGLOBIN: CPT

## 2019-05-05 PROCEDURE — 6360000002 HC RX W HCPCS: Performed by: INTERNAL MEDICINE

## 2019-05-05 PROCEDURE — 6370000000 HC RX 637 (ALT 250 FOR IP): Performed by: INTERNAL MEDICINE

## 2019-05-05 PROCEDURE — C9113 INJ PANTOPRAZOLE SODIUM, VIA: HCPCS | Performed by: INTERNAL MEDICINE

## 2019-05-05 PROCEDURE — 2700000000 HC OXYGEN THERAPY PER DAY

## 2019-05-05 PROCEDURE — 2060000000 HC ICU INTERMEDIATE R&B

## 2019-05-05 PROCEDURE — 85014 HEMATOCRIT: CPT

## 2019-05-05 PROCEDURE — 94640 AIRWAY INHALATION TREATMENT: CPT

## 2019-05-05 RX ADMIN — SUCRALFATE 1 G: 1 SUSPENSION ORAL at 06:36

## 2019-05-05 RX ADMIN — GLYCOPYRROLATE AND FORMOTEROL FUMARATE 2 PUFF: 9; 4.8 AEROSOL, METERED RESPIRATORY (INHALATION) at 06:53

## 2019-05-05 RX ADMIN — SUCRALFATE 1 G: 1 SUSPENSION ORAL at 22:24

## 2019-05-05 RX ADMIN — SUCRALFATE 1 G: 1 SUSPENSION ORAL at 17:23

## 2019-05-05 RX ADMIN — Medication 10 ML: at 22:23

## 2019-05-05 RX ADMIN — PANTOPRAZOLE SODIUM 40 MG: 40 INJECTION, POWDER, LYOPHILIZED, FOR SOLUTION INTRAVENOUS at 22:23

## 2019-05-05 RX ADMIN — SODIUM CHLORIDE: 9 INJECTION, SOLUTION INTRAVENOUS at 09:08

## 2019-05-05 RX ADMIN — GLYCOPYRROLATE AND FORMOTEROL FUMARATE 2 PUFF: 9; 4.8 AEROSOL, METERED RESPIRATORY (INHALATION) at 18:57

## 2019-05-05 RX ADMIN — PANTOPRAZOLE SODIUM 40 MG: 40 INJECTION, POWDER, LYOPHILIZED, FOR SOLUTION INTRAVENOUS at 09:07

## 2019-05-05 ASSESSMENT — PAIN SCALES - GENERAL: PAINLEVEL_OUTOF10: 0

## 2019-05-05 NOTE — PROGRESS NOTES
Pt refusing BMP to be drawn. Lab is unable to be added to blood draw that was taken this AM. Hospitalist aware.

## 2019-05-05 NOTE — PROGRESS NOTES
Report received from [facility]. Pt asleep in no obvious distress. Care transferred.

## 2019-05-05 NOTE — PROGRESS NOTES
AM assessment completed, see flowsheet. Patient resting in bed at this time, repeatedly yelling out for wife. All needs met. Bed in lowest position and locked, SR up x2. Call light and bedside table within reach.

## 2019-05-05 NOTE — PROGRESS NOTES
11.2 oz (50.2 kg), SpO2 95 %. Subjective:  Symptoms:  Stable. Pain:  He reports no pain. Objective:  General Appearance: In no acute distress and not in pain. Vital signs: (most recent): Blood pressure 116/78, pulse 89, temperature 98.6 °F (37 °C), temperature source Oral, resp. rate 18, height 5' 5\" (1.651 m), weight 110 lb 11.2 oz (50.2 kg), SpO2 95 %. Heart: Normal rate. Regular rhythm. S1 normal and S2 normal.    Abdomen: Abdomen is soft. Bowel sounds are normal.   There is no abdominal tenderness. Assessment & Plan  78-year-old male with COPD, GERD and refractory esophageal stricture s/p multiple esophageal dilations, and failed esophageal stent and kenalog injection now admitted with anemia and GI Bleed.  EGD revealed severe ulcerative esophagitis with PEG in place, but patient is stating has not been using it, refuses to use it and wants PO diet  - Continue bid PPI and Carafate  - F/U daily H/H  - Will start full liquid diet and consult Speech for a swallow eval  - Transfuse as needed   - Will follow     Stephanie Anaya MD       (O) [phone]      Stephanie Anaya MD  5/5/2019

## 2019-05-05 NOTE — PROGRESS NOTES
Pt is alert and oriented X3, disoriented to time. Pt answers questions appropriately. Bed Alarm in place for safety. Pt Shift assessment complete. Pt repositioned in bed. Pt refuses to let RN see ssacrum at this time. Will continue to monitor. Call light in place.

## 2019-05-05 NOTE — PROGRESS NOTES
Hospitalist Progress Note      PCP: Yana Shirley, APRN - CNP    Date of Admission: 5/3/2019    Subjective: only wants to eat PO, refusing tube feeds, very upset and agitated    Medications:  Reviewed    Infusion Medications    sodium chloride 75 mL/hr at 05/05/19 0908     Scheduled Medications    sucralfate  1 g Oral 4x Daily AC & HS    pantoprazole  40 mg Intravenous BID    rivastigmine  1 patch Transdermal Daily    sodium chloride flush  10 mL Intravenous 2 times per day    glycopyrrolate-formoterol  2 puff Inhalation BID     PRN Meds: albuterol sulfate HFA, ipratropium, nitroGLYCERIN, sodium chloride flush, acetaminophen, ondansetron, morphine      Intake/Output Summary (Last 24 hours) at 5/5/2019 1354  Last data filed at 5/5/2019 1233  Gross per 24 hour   Intake 1250 ml   Output 1450 ml   Net -200 ml       Physical Exam Performed:    /64   Pulse 86   Temp 97.9 °F (36.6 °C) (Oral)   Resp 18   Ht 5' 5\" (1.651 m)   Wt 110 lb 11.2 oz (50.2 kg)   SpO2 98%   BMI 18.42 kg/m²       Gen: No distress. Eyes: PERRL. No sclera icterus. No conjunctival injection. ENT: No discharge. Pharynx clear. Neck: Trachea midline. Normal thyroid. Resp: No accessory muscle use. No crackles. No wheezes. No rhonchi. No dullness on percussion. CV: Regular rate. Regular rhythm. No murmur or rub. No edema. GI: Non-tender. Non-distended. No masses. No organomegaly. Normal bowel sounds. No hernia. PEG in place   Skin: Warm and dry. No nodule on exposed extremities. No rash on exposed extremities. Lymph: No cervical LAD. No supraclavicular LAD. M/S: No cyanosis. No joint deformity. No clubbing. Neuro: Awake.  Oriented to self    Labs:   Recent Labs     05/04/19  1845 05/05/19  0029 05/05/19  0636   HGB 8.0* 8.0* 8.4*   HCT 24.2* 24.1* 25.2*     Recent Labs     05/03/19  0557      K 4.5      CO2 29   BUN 48*   CREATININE 1.0   CALCIUM 8.1*     Recent Labs     05/03/19  0557   AST 21   ALT 14 BILITOT 0.4   ALKPHOS 52     No results for input(s): INR in the last 72 hours. No results for input(s): Amber Corey in the last 72 hours. Urinalysis:      Lab Results   Component Value Date    NITRU Negative 04/09/2019    WBCUA 3-5 04/09/2019    BACTERIA 1+ 04/01/2019    RBCUA 20-50 04/09/2019    BLOODU LARGE 04/09/2019    SPECGRAV <=1.005 04/09/2019    GLUCOSEU Negative 04/09/2019       Radiology:  No orders to display           Assessment/Plan:    Active Hospital Problems    Diagnosis Date Noted    Severe protein-calorie malnutrition ([facility] [address] [E43] 04/10/2019    Upper GI bleed [K92.2] 04/09/2019         Hematemesis   ABLA  - hx of GIB, last admission on 4/9/19  - had EGD that showed no bleeding, was s/p 15 mm dilatation   - Was discharged home on PPI   - Now with coffee ground emesis, H/H stable from discharge labs, but hypotensive at OSH   - on PPI , GI consulted  - H/H dropped-->transfused 1 unit PRBC on admit  - hb again dropped to 6.8-->blood transfusion 5/4, monitor hb  - Transfuse hgb <7.0   - s/p EGD 5/3 with severe ulcerative esophagitis  - hb stable  At ~8     COPD Chronic Hypoxic respiratory failure   - Uses 2L NC O2 at baseline   - NO AE   - Continue home IBD     Chronic Atrial fibrillation   - Rate controlled  - No on AC      Dementia   - supportive care      Dysphagia   - hx of esophageal strictures with dilations   - has PEG, plan is to continue TF once cleared for diet, but pt refusing PEG tube feeds and requesting PEG tube to come off and wants to eat by mouth.  Appr GI eval, full liquids for now and SLP eval    Hypoglycemia - 2/2 refusing tube feeds and remaining NPO, will watch BS on full liquids        DVT Prophylaxis: SCD  Diet: DIET FULL LIQUID;  Code Status: Full Code    PT/OT Eval Status: ordered, needs SNF per spouse    Dion Garvey MD

## 2019-05-05 NOTE — PLAN OF CARE
Problem: Falls - Risk of:  Goal: Will remain free from falls  Description  Will remain free from falls  5/5/2019 0928 by Marco Neves RN  Outcome: Ongoing  5/5/2019 0104 by Di Chua RN  Outcome: Ongoing  Goal: Absence of physical injury  Description  Absence of physical injury  5/5/2019 0928 by Marco Neves RN  Outcome: Ongoing  5/5/2019 0104 by Di Chua RN  Outcome: Ongoing     Problem: Risk for Impaired Skin Integrity  Goal: Tissue integrity - skin and mucous membranes  Description  Structural intactness and normal physiological function of skin and  mucous membranes. 5/5/2019 0928 by Marco Neves RN  Outcome: Ongoing  5/5/2019 0104 by Di Chua RN  Outcome: Ongoing  Note:   Pt refuses to let staff turn him to offload. Problem: SAFETY  Goal: Free from accidental physical injury  Outcome: Ongoing  Goal: Free from intentional harm  Outcome: Ongoing     Problem: DAILY CARE  Goal: Daily care needs are met  Outcome: Ongoing     Problem: PAIN  Goal: Patient's pain/discomfort is manageable  Outcome: Ongoing     Problem: SKIN INTEGRITY  Goal: Skin integrity is maintained or improved  Outcome: Ongoing     Problem: KNOWLEDGE DEFICIT  Goal: Patient/S.O. demonstrates understanding of disease process, treatment plan, medications, and discharge instructions.   Outcome: Ongoing     Problem: DISCHARGE BARRIERS  Goal: Patient's continuum of care needs are met  Outcome: Ongoing     Problem: Confusion - Acute:  Goal: Absence of continued neurological deterioration signs and symptoms  Description  Absence of continued neurological deterioration signs and symptoms  5/5/2019 0928 by Marco Neves RN  Outcome: Ongoing  5/5/2019 0104 by Di Chua RN  Outcome: Ongoing  Goal: Mental status will be restored to baseline  Description  Mental status will be restored to baseline  5/5/2019 0928 by Marco Neves RN  Outcome: Ongoing  5/5/2019 0104 by Di Chua RN  Outcome: Ongoing Problem: Discharge Planning:  Goal: Ability to perform activities of daily living will improve  Description  Ability to perform activities of daily living will improve  Outcome: Ongoing  Goal: Participates in care planning  Description  Participates in care planning  Outcome: Ongoing     Problem: Injury - Risk of, Physical Injury:  Goal: Will remain free from falls  Description  Will remain free from falls  5/5/2019 0928 by Jes Holcomb RN  Outcome: Ongoing  5/5/2019 0104 by Kalyani Forbes RN  Outcome: Ongoing  Goal: Absence of physical injury  Description  Absence of physical injury  5/5/2019 0928 by Jes Holcomb RN  Outcome: Ongoing  5/5/2019 0104 by Kalyani Forbes RN  Outcome: Ongoing     Problem: Mood - Altered:  Goal: Mood stable  Description  Mood stable  Outcome: Ongoing  Goal: Absence of abusive behavior  Description  Absence of abusive behavior  Outcome: Ongoing  Goal: Verbalizations of feeling emotionally comfortable while being cared for will increase  Description  Verbalizations of feeling emotionally comfortable while being cared for will increase  Outcome: Ongoing     Problem: Psychomotor Activity - Altered:  Goal: Absence of psychomotor disturbance signs and symptoms  Description  Absence of psychomotor disturbance signs and symptoms  Outcome: Ongoing     Problem: Sensory Perception - Impaired:  Goal: Demonstrations of improved sensory functioning will increase  Description  Demonstrations of improved sensory functioning will increase  Outcome: Ongoing  Goal: Decrease in sensory misperception frequency  Description  Decrease in sensory misperception frequency  Outcome: Ongoing  Goal: Able to refrain from responding to false sensory perceptions  Description  Able to refrain from responding to false sensory perceptions  Outcome: Ongoing  Goal: Demonstrates accurate environmental perceptions  Description  Demonstrates accurate environmental perceptions  Outcome: Ongoing  Goal: Able to

## 2019-05-06 PROBLEM — A41.9 SEPSIS (HCC): Status: RESOLVED | Noted: 2018-12-22 | Resolved: 2019-05-06

## 2019-05-06 PROBLEM — J18.9 COMMUNITY ACQUIRED PNEUMONIA: Status: RESOLVED | Noted: 2018-12-11 | Resolved: 2019-05-06

## 2019-05-06 PROBLEM — J18.9 HCAP (HEALTHCARE-ASSOCIATED PNEUMONIA): Status: RESOLVED | Noted: 2018-12-22 | Resolved: 2019-05-06

## 2019-05-06 PROBLEM — E87.6 HYPOKALEMIA: Status: ACTIVE | Noted: 2019-05-06

## 2019-05-06 LAB
ANION GAP SERPL CALCULATED.3IONS-SCNC: 4 MMOL/L (ref 3–16)
ANION GAP SERPL CALCULATED.3IONS-SCNC: 5 MMOL/L (ref 3–16)
BASOPHILS ABSOLUTE: 0 K/UL (ref 0–0.2)
BASOPHILS RELATIVE PERCENT: 0.3 %
BUN BLDV-MCNC: 4 MG/DL (ref 7–20)
BUN BLDV-MCNC: 4 MG/DL (ref 7–20)
CALCIUM SERPL-MCNC: 7.7 MG/DL (ref 8.3–10.6)
CALCIUM SERPL-MCNC: 8 MG/DL (ref 8.3–10.6)
CHLORIDE BLD-SCNC: 100 MMOL/L (ref 99–110)
CHLORIDE BLD-SCNC: 103 MMOL/L (ref 99–110)
CO2: 29 MMOL/L (ref 21–32)
CO2: 30 MMOL/L (ref 21–32)
CREAT SERPL-MCNC: <0.5 MG/DL (ref 0.8–1.3)
CREAT SERPL-MCNC: <0.5 MG/DL (ref 0.8–1.3)
EOSINOPHILS ABSOLUTE: 0.2 K/UL (ref 0–0.6)
EOSINOPHILS RELATIVE PERCENT: 1.9 %
GFR AFRICAN AMERICAN: >60
GFR AFRICAN AMERICAN: >60
GFR NON-AFRICAN AMERICAN: >60
GFR NON-AFRICAN AMERICAN: >60
GLUCOSE BLD-MCNC: 112 MG/DL (ref 70–99)
GLUCOSE BLD-MCNC: 116 MG/DL (ref 70–99)
GLUCOSE BLD-MCNC: 138 MG/DL (ref 70–99)
GLUCOSE BLD-MCNC: 139 MG/DL (ref 70–99)
GLUCOSE BLD-MCNC: 145 MG/DL (ref 70–99)
HCT VFR BLD CALC: 24.3 % (ref 40.5–52.5)
HCT VFR BLD CALC: 24.7 % (ref 40.5–52.5)
HCT VFR BLD CALC: 25.9 % (ref 40.5–52.5)
HEMOGLOBIN: 8.1 G/DL (ref 13.5–17.5)
HEMOGLOBIN: 8.3 G/DL (ref 13.5–17.5)
HEMOGLOBIN: 8.8 G/DL (ref 13.5–17.5)
LYMPHOCYTES ABSOLUTE: 1.1 K/UL (ref 1–5.1)
LYMPHOCYTES RELATIVE PERCENT: 14 %
MAGNESIUM: 1.5 MG/DL (ref 1.8–2.4)
MCH RBC QN AUTO: 28.6 PG (ref 26–34)
MCHC RBC AUTO-ENTMCNC: 33.5 G/DL (ref 31–36)
MCV RBC AUTO: 85.2 FL (ref 80–100)
MONOCYTES ABSOLUTE: 0.7 K/UL (ref 0–1.3)
MONOCYTES RELATIVE PERCENT: 8.5 %
NEUTROPHILS ABSOLUTE: 6 K/UL (ref 1.7–7.7)
NEUTROPHILS RELATIVE PERCENT: 75.3 %
PDW BLD-RTO: 16.5 % (ref 12.4–15.4)
PERFORMED ON: ABNORMAL
PLATELET # BLD: 194 K/UL (ref 135–450)
PMV BLD AUTO: 7.9 FL (ref 5–10.5)
POTASSIUM SERPL-SCNC: 2.6 MMOL/L (ref 3.5–5.1)
POTASSIUM SERPL-SCNC: 4.4 MMOL/L (ref 3.5–5.1)
RBC # BLD: 2.85 M/UL (ref 4.2–5.9)
SODIUM BLD-SCNC: 135 MMOL/L (ref 136–145)
SODIUM BLD-SCNC: 136 MMOL/L (ref 136–145)
WBC # BLD: 7.9 K/UL (ref 4–11)

## 2019-05-06 PROCEDURE — 6370000000 HC RX 637 (ALT 250 FOR IP): Performed by: INTERNAL MEDICINE

## 2019-05-06 PROCEDURE — 97166 OT EVAL MOD COMPLEX 45 MIN: CPT

## 2019-05-06 PROCEDURE — 6360000002 HC RX W HCPCS: Performed by: INTERNAL MEDICINE

## 2019-05-06 PROCEDURE — 94640 AIRWAY INHALATION TREATMENT: CPT

## 2019-05-06 PROCEDURE — 2060000000 HC ICU INTERMEDIATE R&B

## 2019-05-06 PROCEDURE — 85014 HEMATOCRIT: CPT

## 2019-05-06 PROCEDURE — 2580000003 HC RX 258: Performed by: INTERNAL MEDICINE

## 2019-05-06 PROCEDURE — 97530 THERAPEUTIC ACTIVITIES: CPT

## 2019-05-06 PROCEDURE — 99232 SBSQ HOSP IP/OBS MODERATE 35: CPT | Performed by: INTERNAL MEDICINE

## 2019-05-06 PROCEDURE — 36415 COLL VENOUS BLD VENIPUNCTURE: CPT

## 2019-05-06 PROCEDURE — 85018 HEMOGLOBIN: CPT

## 2019-05-06 PROCEDURE — 92610 EVALUATE SWALLOWING FUNCTION: CPT

## 2019-05-06 PROCEDURE — 80048 BASIC METABOLIC PNL TOTAL CA: CPT

## 2019-05-06 PROCEDURE — 83735 ASSAY OF MAGNESIUM: CPT

## 2019-05-06 PROCEDURE — 97162 PT EVAL MOD COMPLEX 30 MIN: CPT

## 2019-05-06 PROCEDURE — 6370000000 HC RX 637 (ALT 250 FOR IP): Performed by: PHYSICIAN ASSISTANT

## 2019-05-06 PROCEDURE — 92526 ORAL FUNCTION THERAPY: CPT

## 2019-05-06 PROCEDURE — 85025 COMPLETE CBC W/AUTO DIFF WBC: CPT

## 2019-05-06 PROCEDURE — 94761 N-INVAS EAR/PLS OXIMETRY MLT: CPT

## 2019-05-06 PROCEDURE — C9113 INJ PANTOPRAZOLE SODIUM, VIA: HCPCS | Performed by: INTERNAL MEDICINE

## 2019-05-06 PROCEDURE — 2700000000 HC OXYGEN THERAPY PER DAY

## 2019-05-06 PROCEDURE — 36592 COLLECT BLOOD FROM PICC: CPT

## 2019-05-06 RX ORDER — MAGNESIUM SULFATE 1 G/100ML
1 INJECTION INTRAVENOUS PRN
Status: DISCONTINUED | OUTPATIENT
Start: 2019-05-06 | End: 2019-05-08 | Stop reason: HOSPADM

## 2019-05-06 RX ORDER — POTASSIUM CHLORIDE 20 MEQ/1
40 TABLET, EXTENDED RELEASE ORAL ONCE
Status: DISCONTINUED | OUTPATIENT
Start: 2019-05-06 | End: 2019-05-06

## 2019-05-06 RX ORDER — POTASSIUM CHLORIDE 29.8 MG/ML
20 INJECTION INTRAVENOUS PRN
Status: DISCONTINUED | OUTPATIENT
Start: 2019-05-06 | End: 2019-05-08 | Stop reason: HOSPADM

## 2019-05-06 RX ADMIN — GLYCOPYRROLATE AND FORMOTEROL FUMARATE 2 PUFF: 9; 4.8 AEROSOL, METERED RESPIRATORY (INHALATION) at 06:46

## 2019-05-06 RX ADMIN — POTASSIUM CHLORIDE 20 MEQ: 400 INJECTION, SOLUTION INTRAVENOUS at 12:08

## 2019-05-06 RX ADMIN — Medication 10 ML: at 20:13

## 2019-05-06 RX ADMIN — PANTOPRAZOLE SODIUM 40 MG: 40 INJECTION, POWDER, LYOPHILIZED, FOR SOLUTION INTRAVENOUS at 19:57

## 2019-05-06 RX ADMIN — POTASSIUM CHLORIDE 20 MEQ: 400 INJECTION, SOLUTION INTRAVENOUS at 08:31

## 2019-05-06 RX ADMIN — SUCRALFATE 1 G: 1 SUSPENSION ORAL at 17:18

## 2019-05-06 RX ADMIN — MAGNESIUM SULFATE HEPTAHYDRATE 1 G: 1 INJECTION, SOLUTION INTRAVENOUS at 13:56

## 2019-05-06 RX ADMIN — SUCRALFATE 1 G: 1 SUSPENSION ORAL at 05:04

## 2019-05-06 RX ADMIN — POTASSIUM CHLORIDE 20 MEQ: 400 INJECTION, SOLUTION INTRAVENOUS at 10:59

## 2019-05-06 RX ADMIN — POTASSIUM BICARBONATE 40 MEQ: 782 TABLET, EFFERVESCENT ORAL at 13:55

## 2019-05-06 RX ADMIN — GLYCOPYRROLATE AND FORMOTEROL FUMARATE 2 PUFF: 9; 4.8 AEROSOL, METERED RESPIRATORY (INHALATION) at 18:43

## 2019-05-06 RX ADMIN — Medication 10 ML: at 08:30

## 2019-05-06 RX ADMIN — MAGNESIUM SULFATE HEPTAHYDRATE 1 G: 1 INJECTION, SOLUTION INTRAVENOUS at 11:00

## 2019-05-06 RX ADMIN — PANTOPRAZOLE SODIUM 40 MG: 40 INJECTION, POWDER, LYOPHILIZED, FOR SOLUTION INTRAVENOUS at 08:30

## 2019-05-06 RX ADMIN — SUCRALFATE 1 G: 1 SUSPENSION ORAL at 19:57

## 2019-05-06 RX ADMIN — SODIUM CHLORIDE: 9 INJECTION, SOLUTION INTRAVENOUS at 15:33

## 2019-05-06 ASSESSMENT — PAIN SCALES - GENERAL: PAINLEVEL_OUTOF10: 0

## 2019-05-06 NOTE — PROGRESS NOTES
Inpatient Occupational Therapy  Evaluation and Treatment    Unit: PCU  Date:  5/6/2019  Patient Name:    Chema Ruth  Admitting diagnosis:  Upper GI bleed [K92.2]  Admit Date:  5/3/2019  Precautions/Restrictions/WB Status/ Lines/ Wounds/ Oxygen: fall risk, IV, [place]    male with COPD, dementia, esophageal stricture s/p multiple dilations, and prior GI bleed who presented to [facility] ED with complaint of hematemesis. Patient reports that he was at home when he vomited blood      Treatment Time:  6611-3644  Treatment Number: 1   Billable Treatment Time: 20 minutes   Total Treatment Time:   20  minutes    Patient Goals for Therapy:  \" go home my wife will help me \"      Discharge Recommendations: Home w/ 24/7 assist and home therapy  DME needs for discharge: Needs Met       Therapy recommendations for staff:   Assist of 1 with use of rolling walker (RW) for all transfers to a from 40 [facility] S4 Level Recommendation:  Level 1 Standard  AM-PAC Score:     Preadmission Environment      Pt. Lives with spouse and her dtr  Home environment:  Trailer  Steps to enter first floor:   4 steps with HR (on R going up)  Bathroom: Tub/shower  Equipment owned: SPC, SW ,2 wheeled walker , w/c, crutches               O2 related equipment: home O2 (2-3L), pulse ox, concentrator, inhaler, nebulizer     Preadmission Status   Pt. Not Able to drive   Pt. Is IND with dressing (occasional assist)  Pt. Had assistance from spouse for bathing, cooking, cleaning, laundry . Pt  likes to take a bath and does need assist   Pt. Fully independent for transfers and gait and walked with Rolling walker at times and at times IND   History of falls: Denies  Pt. fully independent with use O2 equipment  Pt sleeps on the couch   ACM HC SN/PT/OT/SW,      Pain  No  Rating:NA  Location:NA  Pain Medicine Status: Denies need      Cognition    A&O knows he is in the hospital and what town.  The pt able to state the yr and month but not the day    Able to follow

## 2019-05-06 NOTE — PROGRESS NOTES
Speech Language Pathology  SPEECH NOTE: Therapy hold, patient is working with therapy. No charges. Time 8597 am.   Jessica Noel CCC-SLP. D BCS-S  5-6-19

## 2019-05-06 NOTE — PROGRESS NOTES
Hospitalist Progress Note      PCP: Cata Tucker, APRN - CNP    Date of Admission: 5/3/2019    Subjective: eating full liquid diet, Hgb stable, would like to go home. Medications:  Reviewed    Infusion Medications    sodium chloride 75 mL/hr at 05/05/19 0908     Scheduled Medications    sucralfate  1 g Oral 4x Daily AC & HS    pantoprazole  40 mg Intravenous BID    rivastigmine  1 patch Transdermal Daily    sodium chloride flush  10 mL Intravenous 2 times per day    glycopyrrolate-formoterol  2 puff Inhalation BID     PRN Meds: magnesium sulfate, potassium chloride, albuterol sulfate HFA, ipratropium, nitroGLYCERIN, sodium chloride flush, acetaminophen, ondansetron, morphine      Intake/Output Summary (Last 24 hours) at 5/6/2019 1015  Last data filed at 5/6/2019 0844  Gross per 24 hour   Intake 2842 ml   Output 3100 ml   Net -258 ml       Physical Exam Performed:    /72   Pulse 85   Temp 97.7 °F (36.5 °C) (Axillary)   Resp 18   Ht 5' 5\" (1.651 m)   Wt 108 lb (49 kg)   SpO2 96%   BMI 17.97 kg/m²     Gen: No distress. Elderly  male, alert  Eyes: PERRL. No sclera icterus. No conjunctival injection. Neck: Trachea midline. Normal thyroid. Resp: No accessory muscle use. No crackles. No wheezes. No rhonchi. No dullness on percussion. RA  CV: Regular rate. Regular rhythm. No murmur or rub. No edema. GI: Non-tender. Non-distended. No masses. No organomegaly. Normal bowel sounds. No hernia. PEG in place   Skin: Warm and dry. No nodule on exposed extremities. No rash on exposed extremities. Lymph: No cervical LAD. No supraclavicular LAD. M/S: No cyanosis. No joint deformity. No clubbing. Neuro: Awake.  Oriented to self    Labs:   Recent Labs     05/05/19  1950 05/06/19  0620 05/06/19  0735   WBC  --  7.9  --    HGB 8.2* 8.1* 8.3*   HCT 24.5* 24.3* 24.7*   PLT  --  194  --      Recent Labs     05/06/19  0620   *   K 2.6*      CO2 30   BUN 4*   CREATININE <0.5*   CALCIUM 7.7* Urinalysis:      Lab Results   Component Value Date    NITRU Negative 04/09/2019    WBCUA 3-5 04/09/2019    BACTERIA 1+ 04/01/2019    RBCUA 20-50 04/09/2019    BLOODU LARGE 04/09/2019    SPECGRAV <=1.005 04/09/2019    GLUCOSEU Negative 04/09/2019       Radiology:  No orders to display       I Toño Merritt have reviewed the chart on Christal Jameson and personally interviewed and examined patient, reviewed the data (labs and imaging) and after discussion with my PA formulated the plan. Agree with note with the following edits. HPI:     He is feeling better and wants to go home. His wife wants him to go to rehab. I reviewed the patient's Past Medical History, Past Surgical History, Medications, and Allergies. Physical exam:    /72   Pulse 85   Temp 97.7 °F (36.5 °C) (Axillary)   Resp 18   Ht 5' 5\" (1.651 m)   Wt 108 lb (49 kg)   SpO2 96%   BMI 17.97 kg/m²     Gen: No distress. Alert. Eyes: PERRL. No sclera icterus. No conjunctival injection. ENT: No discharge. Pharynx clear. Neck: Trachea midline. Normal thyroid. Resp: No accessory muscle use. No crackles. No wheezes. No rhonchi. No dullness on percussion. CV: Regular rate. Regular rhythm. No murmur or rub. No edema. Assessment/Plan:    Active Problems:    Upper GI bleed    Severe protein-calorie malnutrition (HCC)  Resolved Problems:    * No resolved hospital problems. *       Hematemesis   ABLA  - hx of GIB, last admission on 4/9/19  - had EGD that showed no bleeding, was s/p 15 mm dilatation   - Was discharged home on PPI   - Now with coffee ground emesis, H/H stable from discharge labs, but hypotensive at OSH   - on PPI , GI consulted  - H/H dropped-->transfused 1 unit PRBC on admit  - hgb again dropped to 6.8--> s/p blood transfusion 5/4, monitor hgb  - Transfuse hgb <7.0   - s/p EGD 5/3 with severe ulcerative esophagitis  - hb stable: 8.1 --> 8.3 today    Hypokalemia- has been refusing TF at home.  Mag was also

## 2019-05-06 NOTE — PROGRESS NOTES
Inpatient Physical Therapy Evaluation and Treatment    Unit: PCU  Date:  5/6/2019  Patient Name:    Christal Jameson  Admitting diagnosis:  Upper GI bleed [K92.2]  Admit Date:  5/3/2019  Precautions/Restrictions/WB Status/ Lines/ Wounds/ Oxygen: fall risk, IV, bed/chair alarm and supplemental o2 (1L), telemetry     Treatment Time:  10:28-10:58  Treatment Number:  1   Timed Code Treatment Minutes: 20 minutes  Total Treatment Minutes:  30  minutes    Patient Goals for Therapy: \" to go home \"          Discharge Recommendations: Home w/ 24/7 assist and home therapy  DME needs for discharge: Needs Met       Therapy recommendations for staff:   Assist of 1 with use of No AD for all transfers to/from bedside commode    Home Health S4 Level Recommendation:  NA  AM-PAC Mobility Score    AM-PAC Inpatient Mobility Raw Score : 18     Preadmission Environment       Pt. Lives with spouse and her dtr  Home environment:  Trailer  Steps to enter first floor:   4 steps with HR (on R going up)  Bathroom: Tub/shower  Equipment owned: SPC, SW ,2 wheeled walker , w/c, crutches               O2 related equipment: home O2 (2-3L), pulse ox, concentrator, inhaler, nebulizer     Preadmission Status   Pt. Not Able to drive   Pt. Is IND with dressing (occasional assist)  Pt. Had assistance from spouse for bathing, cooking, cleaning, laundry . Pt  likes to take a bath and does need assist   Pt. Fully independent for transfers and gait and walked with Rolling walker at times and at times IND   History of falls: Denies  Pt. fully independent with use O2 equipment  Pt sleeps on the couch   ACM HC SN/PT/OT/SW,      Pain  No  Rating:NA  Location:NA  Pain Medicine Status: Denies need       Cognition    A&O x4 knows he is in the hospital and what town. The pt able to state the yr and month but not the day    Able to follow 2 step commands, [place]     Subjective  Patient lying supine in bed with no family present  Pt agreeable to this PT eval & tx.  Pt agitated with therapists present. Upper Extremity ROM/Strength  Please see OT evaluation. Lower Extremity ROM / Strength    AROM WFL: Yes  ROM limitations:     Strength Assessment (measured on a 0-5 scale):  R LE   Quad   5/5   Ant Tib  5/5   Hamstring 5/5   Iliopsoas 5/5  L LE  Quad   5/5   Ant Tib  5/5   Hamstring 5/5   Iliopsoas 5/5    Lower Extremity Sensation    WFL    Lower Extremity Proprioception:   WFL    Coordination and Tone  WFL    Balance  Static Sitting:  Normal   Tolerance: tolerated sitting EOB ~5 mins & sitting on BSC ~5mins   Dynamic Sitting:  Good   Static Standing: Fair +   Tolerance: required BUE support and forward flexed posture and CGA ~10 secs    Dynamic Standing: Fair -     Bed Mobility   Supine to Sit:   Independent  Sit to Supine:  Independent  Rolling:   Not Tested  Scooting at EOB: Independent  Scooting to [facility]:  Not Tested    Transfer Training     Sit to stand:   SBA  Stand to sit:   SBA  Bed to BSC:  CGA with use of No AD   BSC to Chair:   CGA with use of No AD    Gait gait deferred due to fatigue     Stair Training deferred, pt unsafe/not appropriate to complete stairs at this time    Activity Tolerance   Pt completed therapy session with SOB noted w/activity- nurse notified   SpO2: 93% supine; 92% seated on BSC; 91% supine post-transfer to bed   HR: 83bpm supine; 103bpm on BSC  BP:     Positioning Needs   Pt returned to bed, call light and needs in reach, alarm set and RN in room     Exercises Initiated    N/A    Other  None. Patient/Family Education   Pt educated on role of inpatient PT, POC, importance of continued activity, transfer techniques, pursed lip breathing and calling for assist with mobility. Assessment   Pt seen for Physical Therapy evaluation in acute care setting. Pt demonstrated decreased Activity tolerance and Safety and decreased independence with Ambulation and Transfers. Pt would benefit from acute PT to increase independence with functional mobility. Goals : To be met in 3 visits:  1). Tolerate LE Ex x 10 reps    To be met in 6 visits:  1). Supine to/from sit: Independent  2). Sit to/from stand: Independent  3). Bed to chair: Supervision  4). Gait: Ambulate 50 ft  with  CGA  and use of LRAD  5). Tolerate B LE exercises 3 sets of 10-15 reps    Rehabilitation Potential: Good  Strengths for achieving goals include:   PLOF  Barriers to achieving goals include: Other: patient's agitation    Plan    To be seen 3-5 x / week  while in acute care setting for therapeutic exercises, bed mobility, transfers, progressive gait training, balance training, and family/patient education. MARK Bui   PT present for entire treatment session, providing direct one on one service, and making all skilled judgements and assessments for the treatment while allowing student participation. If patient discharges from this facility prior to next visit, this note will serve as the Discharge Summary.

## 2019-05-06 NOTE — PROGRESS NOTES
Speech Language Pathology  Facility/Department: [facility] PCU TELEMETRY   BEDSIDE SWALLOW EVALUATION    NAME: Christal Jameson  : 1950  MRN: [id number]    ADMISSION DATE: 5/3/2019  ADMITTING DIAGNOSIS: has Advanced COPD ([address] Tobacco abuse; Chronic respiratory failure with hypoxia ([address] Dysphagia; Esophageal stricture; Moderate protein-calorie malnutrition ([address] Migration of esophageal stent; Dementia without behavioral disturbance; Upper GI bleed; Severe protein-calorie malnutrition ([address] Acute blood loss anemia; and Hypokalemia on their problem list.  ONSET DATE:   5-3-19        Date of Eval: 2019  Evaluating Therapist: Michell Sunshine CCC-SLPKarina WILEY BCS-S    Current Diet level:  Current Liquid Diet : Full             Pain:  No report of pain    Reason for Referral  Christal Jameson was referred for a bedside swallow evaluation to assess the efficiency of his swallow function, identify signs and symptoms of aspiration and make recommendations regarding safe dietary consistencies, effective compensatory strategies, and safe eating environment. Impression  Dysphagia Impression :  Patient has clinical signs of aspiration with oral intake at this time. The patient had laryngeal penetration with thin, nectar thick , and puree on the last MBS in March. The patient should ideally have oral intake on hold, until MBS can be completed. He is not sure if he wants this exam. Findings were discussed with the attending nurse. Treatment Plan  Requires SLP Intervention: Yes  Duration/Frequency of Treatment: TBD  D/C Recommendations:  To be determined       Recommended Diet and Intervention  Suggest NPO until [facility]  Attending nurse was aware    Compensatory Swallowing Strategies   Staff should maintain good oral hygiene for this patient and monitor his temperature, lung, diet, and weight status    Treatment/Goals  Short-term Goals  Timeframe for Short-term Goals: TBC  Goal 1: MBS if patient agrees and with MD order  Long-term Goals  Timeframe for Long-term Goals: TBD  Goal 1: Most functional oral diet for current setting while maintaining adequate nutrition and hydration without signs of aspiration    General  Chart Reviewed: Yes  Comments: Medical chart was checked for CBSE. Subjective  Subjective: Alert in bed, no family present in the room, all visible lines intact and all precautions maintained. Behavior/Cognition: Alert; Impulsive;Agitated; Uncooperative  Temperature Spikes Noted: No  Respiratory Status: O2 via nasual cannula  Breath Sounds: Rhonchi-like breath sounds wet, in area of larynx  O2 Device: Nasal cannula  Communication Observation: Functional  Follows Directions: Simple  Patient Positioning: Upright in bed  Baseline Vocal Quality: Dysphonic  Volitional Cough: Weak  Volitional Swallow: Delayed         ASSESSMENT: SWALLOW EVAL AND SWALLOW TX  WBC 7.9 ON 5-6-19, 3-5-19 SLP note reports puree and nectar thick liquid with shallow penetration partially clearing and deep penetration with thin liquid. Reports use of chin tuck. Last note from this SLP from 3-7-19 reports of aspiration risk with PO intake given pharyngeal and esophageal deficits. At that time, peg had been refused. MD orders at the time were for level III and thin liquid. SLP reported signs of aspiration with oral intake at the time of that note. Case history: The patient had EGD with esophageal dilation on 12-12 and 12-13. The patient had nasal cannula in place and was confused at times. The patient had 12-13-18 esophagram which reports per MD of contrast accumulates in the distal esophagus at the site of an obstruction. The report per MD indicates complete obstruction of the distal esophagus. The patient had CBSE which recs dental soft and thin liquids. The patient had 12-13-18 EGD which reports of severe stricture. The patient had 9-30-18 esophagram per MD reports smooth narrowing of the upper esophagus.  The patient had 6-29-18 unremarkable MBS. The patient had 7-3-18 esophagram reporting mucosal irregularity in the distal esophagus. The patient had 8-21-18 MBS reporting penetration with thin and nectar thick liquid. This report suggests pharyngeal component for the patient's deficits. He is not a candidate for repeat MBS at this time, based on the esophageal condition from the 12-13-18 reports. 11-8-18 EGD reports small hiatal hernia and esophageal stenosis with dilation. The patient had 11-3-18 dilation and stenosis reported on EGD. The patient had 11-12-18 SLP note reporting swallow delay and no sign of aspiration with liquids and they recommended a full liquid diet. The patient has had at least 4 esophageal dilations in the past 8 weeks and still the condition is causing bolus obstruction. Not a FEES candidate. 3-2-19 WBC was 16.9. The patient had 3-1-19 EGD which reports per MD of stent in esophagus was done one month prior and that there was distal migration of the stent. MD reports that he stent was not visible in the esophagus. The patient refused peg per MD and refused hospice. The MD reports during EGD that he was unable to pass the scope through the stricture. The reports indicate hiatal hernia. Reports indicate that the stent was found in the stomach. The patient had esophagram yesterday which reports per MD of residual stenosis in the distal esophagus, likely greater than 50%. EGD from yesterday reports per MD of ectopic circumferential ulcerated tissue was seen in the esophagus from 35-38 cm. The MD notes also report that the scope was able to pass. Also reports of a hiatal hernia being present from 38-42 cm. Reports the esophagus was dilated. He was DC on 4-4-19 and returned to ED 4-9-19. 4-2-19 peg placed and MD note reports a stricture was again evident at the distal esophagus. The scope was difficult to passes. The stricture was then dilated. This is per MD notes.  The patient had another esophageal dilation on 4-10-19 EGD with reports of diaphragmatic hiatus, ectopic mucosa. He was dc on 4-11 and returned on 5-3 with H/P indicating coffee ground emesis. 5-3-19 EGD reports per MD of severe esophagitis. GI note indicates patient refuses to use peg. Patient is noted to have swallow weakness and delay with saliva swallow and PO trials as per palpable assessment of the areas of the mandible, hyoid bone, and thyroid cartilage. He was self feeding thin and nectar thick liquid. The patient has reduction to gag reflex response, fair oral ROM on command, no oral asymmetry. There was intelligible speech. The patient has increased wet and rhonchi-like breath sounds in area of larynx, multiple and weak repeat swallows per bolus. The patient had no oral pooling and no anterior oral loss. Throat clearing and coughing develop with PO intake. He refuses to use chin tuck and feels like liquids are retained in the upper esophagus. He does not want to maintain upright posture he reports from ongoing back discomfort. SLP explained all findings to the patient and the attending nurse at the time of the visit. SLP explained that based on last MBS and the esophagus condition, he is great risk for aspiration. The patient indicates for no additional testing and that he does not need this and just wants to be left alone. SLP explained to the attending nurse that without the proper compensatory technique, based on clinical picture and last MBS, there is increased aspiration risk potential. Would also suggest portable chest x-ray and pulmonary follow up from SLP standpoint.  Sticky note left to the MD.                Prognosis  Prognosis  Prognosis for safe diet advancement: guarded  Individuals consulted  Consulted and agree with results and recommendations: Patient;RN    Education  Patient Education Response: Verbalizes understanding;Demonstrated understanding;Needs reinforcement  Safety Devices in place: Yes  Type of devices: Left in bed;Call light within reach;Nurse notified                Therapy Time  SLP Individual Minutes  Time In: 7867  Time Out: 2216  Minutes: 27        THIS IS A LATE ENTRY, HE WAS SEEN EARLIER TODAY.      [facility] SLPD BCS-S  5/6/2019 2:09 PM

## 2019-05-06 NOTE — PROGRESS NOTES
Pt is aware of speech and GI recommendations to be NPO and receive tube feeds. Pt is educated on risks of aspiration, however is refusing tube feed at this time and wants to remain on [place] diet.

## 2019-05-06 NOTE — PROGRESS NOTES
PROGRESS NOTE  S:68 yrs Patient  admitted on 5/3/2019 with Upper GI bleed [K92.2] . Patient failed swallow study  Exam:   Vitals:    05/06/19 1530   BP: 124/69   Pulse: 80   Resp: 18   Temp: 97.9 °F (36.6 °C)   SpO2: 98%      General appearance: no distress  HEENT: PERRLA  Neck: no adenopathy, no carotid bruit, no JVD, supple, symmetrical, trachea midline and thyroid not enlarged, symmetric, no tenderness/mass/nodules  Lungs: clear to auscultation bilaterally  Heart: regular rate and rhythm, S1, S2 normal, no murmur, click, rub or gallop  Abdomen: soft, non-tender; bowel sounds normal; no masses,  no organomegaly PEG tube c/d/i  Extremities: extremities normal, atraumatic, no cyanosis or edema     Medications: Reviewed    Labs:  CBC:   Recent Labs     05/05/19  1950 05/06/19  0620 05/06/19  0735   WBC  --  7.9  --    HGB 8.2* 8.1* 8.3*   HCT 24.5* 24.3* 24.7*   MCV  --  85.2  --    PLT  --  194  --      BMP:   Recent Labs     05/06/19  0620 05/06/19  1530   * 136   K 2.6* 4.4    103   CO2 30 29   BUN 4* 4*   CREATININE <0.5* <0.5*     LIVER PROFILE: No results for input(s): AST, ALT, LIPASE, PROT, BILIDIR, BILITOT, ALKPHOS in the last 72 hours. Invalid input(s): AMYLASE,  ALB  PT/INR: No results for input(s): INR in the last 72 hours. Invalid input(s): PT    IMAGING:      Impression:  81-year-old male with COPD, GERD and refractory esophageal stricture s/p multiple esophageal dilations, and failed esophageal stent and kenalog injection  With recurrent anemia. Recommendation:  1. Ongoing issue with esophageal stricture and swallowing. Could consider a partially covered stent to minimize risk of migration and address his distal stricutre however with his history of aspiration with swallowing and recurrent stricturing best recommendation is to keep NPO and feed though tube feeds.         Perlita Evans DO  5:50 PM 5/6/2019

## 2019-05-06 NOTE — PLAN OF CARE
Problem: Falls - Risk of:  Goal: Will remain free from falls  Description  Will remain free from falls  Outcome: Ongoing  Goal: Absence of physical injury  Description  Absence of physical injury  Outcome: Ongoing     Problem: Risk for Impaired Skin Integrity  Goal: Tissue integrity - skin and mucous membranes  Description  Structural intactness and normal physiological function of skin and  mucous membranes.   Outcome: Ongoing     Problem: SAFETY  Goal: Free from accidental physical injury  Outcome: Ongoing  Goal: Free from intentional harm  Outcome: Ongoing     Problem: DAILY CARE  Goal: Daily care needs are met  Outcome: Ongoing     Problem: PAIN  Goal: Patient's pain/discomfort is manageable  Outcome: Ongoing

## 2019-05-07 LAB
ANION GAP SERPL CALCULATED.3IONS-SCNC: 4 MMOL/L (ref 3–16)
BASOPHILS ABSOLUTE: 0 K/UL (ref 0–0.2)
BASOPHILS RELATIVE PERCENT: 0.2 %
BUN BLDV-MCNC: 3 MG/DL (ref 7–20)
CALCIUM SERPL-MCNC: 7.7 MG/DL (ref 8.3–10.6)
CHLORIDE BLD-SCNC: 102 MMOL/L (ref 99–110)
CO2: 29 MMOL/L (ref 21–32)
CREAT SERPL-MCNC: <0.5 MG/DL (ref 0.8–1.3)
EOSINOPHILS ABSOLUTE: 0.2 K/UL (ref 0–0.6)
EOSINOPHILS RELATIVE PERCENT: 2.5 %
GFR AFRICAN AMERICAN: >60
GFR NON-AFRICAN AMERICAN: >60
GLUCOSE BLD-MCNC: 117 MG/DL (ref 70–99)
HCT VFR BLD CALC: 25.6 % (ref 40.5–52.5)
HCT VFR BLD CALC: 27 % (ref 40.5–52.5)
HEMOGLOBIN: 8.5 G/DL (ref 13.5–17.5)
HEMOGLOBIN: 9 G/DL (ref 13.5–17.5)
LYMPHOCYTES ABSOLUTE: 1.1 K/UL (ref 1–5.1)
LYMPHOCYTES RELATIVE PERCENT: 16.8 %
MAGNESIUM: 1.8 MG/DL (ref 1.8–2.4)
MCH RBC QN AUTO: 28.9 PG (ref 26–34)
MCHC RBC AUTO-ENTMCNC: 33.1 G/DL (ref 31–36)
MCV RBC AUTO: 87.3 FL (ref 80–100)
MONOCYTES ABSOLUTE: 0.7 K/UL (ref 0–1.3)
MONOCYTES RELATIVE PERCENT: 9.9 %
NEUTROPHILS ABSOLUTE: 4.8 K/UL (ref 1.7–7.7)
NEUTROPHILS RELATIVE PERCENT: 70.6 %
PDW BLD-RTO: 16.4 % (ref 12.4–15.4)
PLATELET # BLD: 189 K/UL (ref 135–450)
PMV BLD AUTO: 8.5 FL (ref 5–10.5)
POTASSIUM SERPL-SCNC: 3.6 MMOL/L (ref 3.5–5.1)
RBC # BLD: 2.94 M/UL (ref 4.2–5.9)
SODIUM BLD-SCNC: 135 MMOL/L (ref 136–145)
WBC # BLD: 6.8 K/UL (ref 4–11)

## 2019-05-07 PROCEDURE — 85025 COMPLETE CBC W/AUTO DIFF WBC: CPT

## 2019-05-07 PROCEDURE — 85014 HEMATOCRIT: CPT

## 2019-05-07 PROCEDURE — 94640 AIRWAY INHALATION TREATMENT: CPT

## 2019-05-07 PROCEDURE — 99232 SBSQ HOSP IP/OBS MODERATE 35: CPT | Performed by: INTERNAL MEDICINE

## 2019-05-07 PROCEDURE — 85018 HEMOGLOBIN: CPT

## 2019-05-07 PROCEDURE — C9113 INJ PANTOPRAZOLE SODIUM, VIA: HCPCS | Performed by: INTERNAL MEDICINE

## 2019-05-07 PROCEDURE — 92526 ORAL FUNCTION THERAPY: CPT

## 2019-05-07 PROCEDURE — 80048 BASIC METABOLIC PNL TOTAL CA: CPT

## 2019-05-07 PROCEDURE — 6370000000 HC RX 637 (ALT 250 FOR IP): Performed by: INTERNAL MEDICINE

## 2019-05-07 PROCEDURE — 2580000003 HC RX 258: Performed by: INTERNAL MEDICINE

## 2019-05-07 PROCEDURE — 6360000002 HC RX W HCPCS: Performed by: INTERNAL MEDICINE

## 2019-05-07 PROCEDURE — 83735 ASSAY OF MAGNESIUM: CPT

## 2019-05-07 PROCEDURE — 2060000000 HC ICU INTERMEDIATE R&B

## 2019-05-07 PROCEDURE — 2700000000 HC OXYGEN THERAPY PER DAY

## 2019-05-07 PROCEDURE — 94761 N-INVAS EAR/PLS OXIMETRY MLT: CPT

## 2019-05-07 RX ORDER — MEMANTINE HYDROCHLORIDE 5 MG/1
5 TABLET ORAL 2 TIMES DAILY
Status: DISCONTINUED | OUTPATIENT
Start: 2019-05-07 | End: 2019-05-08 | Stop reason: HOSPADM

## 2019-05-07 RX ORDER — RIVASTIGMINE 9.5 MG/24H
1 PATCH, EXTENDED RELEASE TRANSDERMAL DAILY
Qty: 30 PATCH | Refills: 3 | Status: SHIPPED | OUTPATIENT
Start: 2019-05-07

## 2019-05-07 RX ORDER — MEMANTINE HYDROCHLORIDE 5 MG-10 MG
KIT ORAL
Qty: 1 PACKAGE | Refills: 0 | Status: SHIPPED | OUTPATIENT
Start: 2019-05-07

## 2019-05-07 RX ORDER — RIVASTIGMINE 9.5 MG/24H
1 PATCH, EXTENDED RELEASE TRANSDERMAL DAILY
Status: DISCONTINUED | OUTPATIENT
Start: 2019-05-07 | End: 2019-05-08 | Stop reason: HOSPADM

## 2019-05-07 RX ADMIN — Medication 10 ML: at 10:10

## 2019-05-07 RX ADMIN — GLYCOPYRROLATE AND FORMOTEROL FUMARATE 2 PUFF: 9; 4.8 AEROSOL, METERED RESPIRATORY (INHALATION) at 19:27

## 2019-05-07 RX ADMIN — PANTOPRAZOLE SODIUM 40 MG: 40 INJECTION, POWDER, LYOPHILIZED, FOR SOLUTION INTRAVENOUS at 09:47

## 2019-05-07 RX ADMIN — SUCRALFATE 1 G: 1 SUSPENSION ORAL at 18:49

## 2019-05-07 RX ADMIN — SUCRALFATE 1 G: 1 SUSPENSION ORAL at 06:13

## 2019-05-07 RX ADMIN — Medication 10 ML: at 20:55

## 2019-05-07 RX ADMIN — GLYCOPYRROLATE AND FORMOTEROL FUMARATE 2 PUFF: 9; 4.8 AEROSOL, METERED RESPIRATORY (INHALATION) at 07:08

## 2019-05-07 RX ADMIN — PANTOPRAZOLE SODIUM 40 MG: 40 INJECTION, POWDER, LYOPHILIZED, FOR SOLUTION INTRAVENOUS at 20:55

## 2019-05-07 RX ADMIN — SODIUM CHLORIDE: 9 INJECTION, SOLUTION INTRAVENOUS at 04:49

## 2019-05-07 RX ADMIN — SUCRALFATE 1 G: 1 SUSPENSION ORAL at 15:54

## 2019-05-07 RX ADMIN — MEMANTINE HYDROCHLORIDE 5 MG: 5 TABLET ORAL at 20:55

## 2019-05-07 ASSESSMENT — PAIN SCALES - GENERAL
PAINLEVEL_OUTOF10: 0

## 2019-05-07 NOTE — PROGRESS NOTES
Occupational Therapy  Attempted OT treatment and the pt declined OT . The pt stated he was going home.    Myrtice Carrel OTR/L [id number]

## 2019-05-07 NOTE — PROGRESS NOTES
Patient in bed resting w/eyes closed. No distress noted. Will continue to monitor. call light within reach

## 2019-05-07 NOTE — DISCHARGE INSTR - COC
Continuity of Care Form    Patient Name: Andrew Miles   :  1950  MRN:  [id number]    Admit date:  5/3/2019  Discharge date:  19     Code Status Order: Full Code   Advance Directives:   Advance Care Flowsheet Documentation     Date/Time Healthcare Directive Type of Healthcare Directive Copy in [address] Agent's Name Healthcare Agent's Phone Number    [phone]  No, patient does not have an advance directive for healthcare treatment -- -- -- -- --          Admitting Physician:  True James MD  PCP: Shaun Nichols, APRN - CNP    Discharging Nurse: [place] [id number] Unit/Room#: /1937-53  Discharging Unit Phone Number: [phone]    Emergency Contact:   Extended Emergency Contact Information  Primary Emergency Contact[facility]"  Mobile Phone: [phone]  Relation: Spouse  Secondary Emergency Contact: Nancy Yuen  Address: [address] [place] [address], [address] [place] [address]  Home Phone: [phone]  Relation: Brother/Sister    Past Surgical History:  Past Surgical History:   Procedure Laterality Date    UPPER GASTROINTESTINAL ENDOSCOPY N/A 2018    EGD DILATION BALLOON performed by Carol Neves MD at [place] [address] N/A 2018    EGD DILATION BALLOON performed by Carol Neves MD at [place] [address]  2018    foreign body removal, balloon dilation    UPPER GASTROINTESTINAL ENDOSCOPY N/A 2018    EGD FOREIGN BODY REMOVAL performed by Carol Neves MD at [place] [address]  2018    EGD DILATION BALLOON performed by Carol Neves MD at [place] [address] 2019    EGD W/20X8 STENT PLACEMENT W/ANES.  (9:30) performed by Carol Neves MD at [facility] ENDOSCOPY  1/30/2019    EGD DILATION BALLOON performed by Oscar Degroot MD at [place] [address]  1/30/2019    EGD BIOPSY performed by Oscar Degroot MD at [place] [address]  03/01/2019    Esophageal stent removed    UPPER GASTROINTESTINAL ENDOSCOPY N/A 3/1/2019    EGD DILATION BALLOON performed by Oscar Degroot MD at [place] [address]  3/1/2019    EGD STENT REMOVAL performed by Oscar Degroot MD at [place] [address] N/A 3/4/2019    EGD BIOPSY performed by Beverley Castleman, DO at [place] [address]  3/4/2019    EGD SUBMUCOSAL/BOTOX INJECTION performed by Beverley Castleman, DO at [place] [address]  3/4/2019    EGD DILATION BALLOON performed by Beverley Castleman, DO at [place] [address]  04/02/2019    EGD with PEG placement    UPPER GASTROINTESTINAL ENDOSCOPY N/A 4/10/2019    EGD DILATION BALLOON performed by Beverley Castleman, DO at [place] [address]  05/03/2019    Severe Esophagitis    UPPER GASTROINTESTINAL ENDOSCOPY N/A 5/3/2019    EGD I performed by Oscar Degroot MD at [address] [facility]       Immunization History:   Immunization History   Administered Date(s) Administered    Influenza Virus Vaccine 10/04/2018    Influenza, [place], 3 Years and older, IM (Fluzone 3 yrs and older or Afluria 5 yrs and older) 11/16/2016    Influenza, [place], 6 mo and older, IM, PF (Flulaval, Fluarix) 12/23/2018    Pneumococcal 13-valent Conjugate ([id number]) 12/23/2018    Pneumococcal Polysaccharide ([id number]) 11/16/2016       Active Problems:  Patient Active Problem List   Diagnosis Code    Advanced COPD ([facility] [address] J44.9    Tobacco abuse Z72.0    Chronic respiratory failure with hypoxia (HCC) J96.11    Dysphagia R13.10    Esophageal stricture K22.2    Moderate protein-calorie malnutrition (HCC) E44.0    Migration of esophageal stent T85.528A    Dementia without behavioral disturbance F03.90    Upper GI bleed K92.2    Severe protein-calorie malnutrition (HCC) E43    Acute blood loss anemia D62    Hypokalemia E87.6       Isolation/Infection:   Isolation          No Isolation            Nurse Assessment:  Last Vital Signs: BP 92/63   Pulse 83   Temp 97.1 °F (36.2 °C) (Oral)   Resp 16   Ht 5' 5\" (1.651 m)   Wt 110 lb 11.2 oz (50.2 kg)   SpO2 93%   BMI 18.42 kg/m²     Last documented pain score (0-10 scale): Pain Level: 0  Last Weight:   Wt Readings from Last 1 Encounters:   05/07/19 110 lb 11.2 oz (50.2 kg)     Mental Status:  oriented, alert, coherent, logical, thought processes intact and able to concentrate and follow conversation    IV Access:  - None    Nursing Mobility/ADLs:  Walking   Dependent  Transfer  Dependent  Bathing  Dependent  Dressing  Dependent  Toileting  Dependent  Feeding  NO FOOD BY MOUTH  Med Admin  VIA PEG  Med Delivery   crushed and via PEG    Wound Care Documentation and Therapy:  Wound 04/09/19 Sacrum (Active)   Wound Pressure Stage  2 4/10/2019  8:58 PM   Dressing Status Clean;Dry; Intact 5/5/2019  7:31 PM   Dressing/Treatment Foam 5/5/2019  7:31 PM   Wound Length (cm) 0.8 cm 4/10/2019  7:27 AM   Wound Width (cm) 0.5 cm 4/10/2019  7:27 AM   Wound Depth (cm) 0.1 cm 4/10/2019  7:27 AM   Wound Surface Area (cm^2) 0.4 cm^2 4/10/2019  7:27 AM   Wound Volume (cm^3) 0.04 cm^3 4/10/2019  7:27 AM   Wound Assessment Clean;Dry;Pink 4/11/2019  7:49 AM   Drainage Amount None 4/11/2019  7:49 AM   Jes-wound Assessment Clean;Dry;Pink 4/11/2019  7:49 AM   Number of days: 28        Elimination:  Continence:   · Bowel:  Yes  · Bladder: Yes  Urinary Catheter: None   Colostomy/Ileostomy/Ileal Conduit: No       Date of Last BM: 5/7    Intake/Output Summary (Last 24 hours) at 5/7/2019 1627  Last data filed at 5/7/2019 1527  Gross per 24 hour   Intake 3154 ml   Output 3275 ml   Net -121 ml     I/O last 3 completed shifts: In: 0819 [P.O.:2160; I.V.:1324; NG/GT:30]  Out: 3375 [Urine:3375]    Safety Concerns: At Risk for Falls and Aspiration Risk    Impairments/Disabilities:      None    Nutrition Therapy:  Current Nutrition Therapy:   - Tube Feedings:  Standard with fiber    Routes of Feeding: Gastrostomy Tube  Liquids: No Liquids  Daily Fluid Restriction: no  Last Modified Barium Swallow with Video (Video Swallowing Test): not done    Treatments at the Time of Hospital Discharge:   Respiratory Treatments:   Oxygen Therapy:  is on oxygen at 2 L/min per nasal cannula.   Ventilator:    - No ventilator support    Rehab Therapies: Physical Therapy and Occupational Therapy  Weight Bearing Status/Restrictions: No weight bearing restirctions  Other Medical Equipment (for information only, NOT a DME order):  bedside commode and hospital bed  Other Treatments:     Patient's personal belongings (please select all that are sent with patient):  None    RN SIGNATURE:  Electronically signed by Silas Brown RN on 5/8/19 at 12:31 PM    CASE MANAGEMENT/SOCIAL WORK SECTION    Inpatient Status Date: 5/7/19    Readmission Risk Assessment Score:  Readmission Risk              Risk of Unplanned Readmission:        27           Discharging to Facility/ Agency   · Name: [facility]  · Address:  · Phone: [phone]  · Fax:    Dialysis Facility (if applicable)   · Name:  · Address:  · Dialysis Schedule:  · Phone:  · Fax:    / signature: Electronically signed by Franklin Weller RN on 5/7/19 at 4:29 PM    PHYSICIAN SECTION    Prognosis: {Prognosis:6907513831}    Condition at Discharge: [address] Patient Condition:700861473}    Rehab Potential (if transferring to Rehab): {Prognosis:7654659046}    Recommended Labs or Other Treatments After Discharge: ***    Physician Certification: I certify the above information and transfer of Jolynn Adams  is necessary for the continuing treatment of the diagnosis listed and that he requires {Admit to Appropriate Level of Care:96988} for {GREATER/LESS:931454745} 30 days.      Update Admission H&P: {CHP DME Changes in CLCTL:015385606}    PHYSICIAN SIGNATURE:  Electronically signed by V.O. Dr. Severa Necessary, RN on 5/7/19 at 4:29 PM

## 2019-05-07 NOTE — PROGRESS NOTES
Physical Therapy  PT attempted to work with patient this am. Pt was agitated and declined to work with PT stating he would do therapy at home. Therapists will attempt again tomorrow as schedule allows.     Negra Singh, SPT

## 2019-05-07 NOTE — PROGRESS NOTES
2. 6* 4.4 3.6    103 102   CO2 30 29 29   BUN 4* 4* 3*   CREATININE <0.5* <0.5* <0.5*   CALCIUM 7.7* 8.0* 7.7*     Urinalysis:      Lab Results   Component Value Date    NITRU Negative 04/09/2019    WBCUA 3-5 04/09/2019    BACTERIA 1+ 04/01/2019    RBCUA 20-50 04/09/2019    BLOODU LARGE 04/09/2019    SPECGRAV <=1.005 04/09/2019    GLUCOSEU Negative 04/09/2019       Radiology:  No orders to display           Assessment/Plan:    Active Problems:    Advanced COPD (HCC)    Chronic respiratory failure with hypoxia (HCC)    Moderate protein-calorie malnutrition (HCC)    Dementia without behavioral disturbance    Upper GI bleed    Severe protein-calorie malnutrition (HCC)    Acute blood loss anemia    Hypokalemia  Resolved Problems:    * No resolved hospital problems. *       Hematemesis   ABLA  - hx of GIB, last admission on 4/9/19  - had EGD that showed no bleeding, was s/p 15 mm dilatation   - Was discharged home on PPI   - Now with coffee ground emesis, H/H stable from discharge labs, but hypotensive at OSH   - on PPI , GI consulted  - H/H dropped-->transfused 1 unit PRBC on admit  - hgb again dropped to 6.8--> s/p blood transfusion 5/4, monitor hgb  - Transfuse hgb <7.0   - s/p EGD 5/3 with severe ulcerative esophagitis  - hb stable: 8.1 --> 8.5 today  -  Will make NPO and start tube feeds. Hypokalemia- has been refusing TF at home. Mag was also low.  - 2.6  - replete  - monitor. Recheck today. Hypomagnesemia  - 1.5  - replete  - monitor     COPD - no AE  Chronic Hypoxic respiratory failure   - Uses 2L NC O2 at baseline   - Continue home IBD     Chronic Atrial fibrillation   - Rate controlled  - No on AC      Dementia   - supportive care   -  Increase dose of Exelon and start Namenda.     Dysphagia   - hx of esophageal strictures with dilations   - has PEG, plan is to continue TF once cleared for diet, but pt refusing PEG tube feeds and requesting PEG tube to come off and wants to eat by mouth.  I talked to him, made him NPO and put him back on TF.  - Appr GI eval, full liquids for now and SLP eval    Hypoglycemia - Resolved       DVT Prophylaxis: SCD  Diet: DIET TUBE FEED CONTINUOUS/CYCLIC NPO; STANDARD WITH FIBER (Jevity 1.2 ); Gastrostomy; (q 6 hr );  Exceptions are: Ice Chips  Code Status: Full Code     PT/OT Eval Status: ordered, needs SNF per spouse, pt wants to go home, CM to discuss with spouse        Ta Perez 5/7/2019 7:16 PM

## 2019-05-07 NOTE — PROGRESS NOTES
of care. Discharge Recommendations: If pt discharges from hospital prior to Speech/Swallowing discharge, this note serves as tx and discharge summary. Total Treatment Time:   7859-1621  Swallow tx    Stacy Collado CCC-SLP. D B[facility]-S  5-7-19

## 2019-05-07 NOTE — PROGRESS NOTES
Shift assessment completed. Patient in bed awake,alert and oriented x4. Denies any discomfort at this time. Refusing to turn, nurse unable to assess sacrum, patient refusing SCDs. Patient upset about diet change and thought of using PEG tube. Educated patient on the purpose of diet change and benefit of PEG tube. Refusing teaching at this time. Evening medications given per [facility] order. No issues, labs collected and sent. Patient has slight cough non-productive. Patient denies any needs at this time, will continue to monitor. Call light within reach.

## 2019-05-07 NOTE — DISCHARGE SUMMARY
Name:  Med Cowan  Room:  /6334-43  MRN:    [id number]    Discharge Summary      This discharge summary is in conjunction with a complete physical exam done on the day of discharge. Discharging Physician: Dr. Lynn [place]: 5/3/2019  Discharge:  5/8/2019    HPI taken from admission H&P:      The patient is a 80 y.o. male with COPD, dementia, esophageal stricture s/p multiple dilations, and prior GI bleed who presented to [facility] ED with complaint of hematemesis. Patient reports that he was at home when he vomited blood. States he hasnt had any further episodes of it. States he did not have any vomit but felt that it was all blood. He denies any associated pain and did not believe he had any BRBPR or dark stools. He is otherwise a poor historian and does not provide any further information.      Per the admitting provider, it was reported that the patient was hypotensive in the ED and a CVC was placed with IVF which improved his BP. Diagnoses this Admission and Hospital Course     Hematemesis - likely 2/2 ulcerative esophagitis  ABLA  - hx of GIB, last admission on 4/9/19  - had EGD that showed no bleeding, was s/p 15 mm dilatation   - Was discharged home on PPI   - Now with coffee ground emesis, H/H stable from discharge labs, but hypotensive at OSH   - on PPI , GI consulted  - H/H dropped-->transfused 1 unit PRBC on admit  - hgb again dropped to 6.8--> s/p blood transfusion 5/4, monitor hgb  - Transfuse hgb <7.0   - s/p EGD 5/3 with severe ulcerative esophagitis  - hb stable: 8.1 -> 8.3 -> 8.7 at d/c  - stopped ASA at d/c     Hypokalemia - has been refusing TF at home. Mag was also low - Resolved  - 2.6  - replete  - monitor.  Rechecked --> 4.1     Hypomagnesemia - Resolved  - 1.5  - replete  - monitored --> 1.8     COPD - no AE  Chronic Hypoxic respiratory failure   - Uses 2L NC O2 at baseline   - Continued home IBD     Chronic Atrial fibrillation   - Rate controlled  - No on AC      Dementia - supportive care      Dysphagia   Hx of Esophageal Strictures s/p dilations  - has PEG, plan is to continue TF once cleared for diet, but pt refusing PEG tube feeds and requesting PEG tube to come off and wants to eat by mouth. - Appr GI eval, full liquids for now and SLP eval  - failed swallow study --> NPO, started tube feeds  - discharged on TF     Hypoglycemia - Resolved  - 2/2 refusing tube feeds and remaining NPO  - will watch BS on full liquids  - BG improved     Severe PCM  - dietitian consulted    Procedures (Please Review Full Report for Details)  EGD - severe esophagitis, no active bleeding    Consults    Gastroenterology    Physical Exam at Discharge:    /60   Pulse 90   Temp 98.3 °F (36.8 °C) (Oral)   Resp 16   Ht 5' 5\" (1.651 m)   Wt 101 lb (45.8 kg)   SpO2 94%   BMI 16.81 kg/m²     Gen: No distress. Alert. Eyes: PERRL. No sclera icterus. No conjunctival injection. ENT: No discharge. Pharynx clear. Neck: Trachea midline. Normal thyroid. Resp: No accessory muscle use. No crackles. No wheezes. No rhonchi. No dullness on percussion. CV: Regular rate. Regular rhythm. No murmur or rub. No edema. CBC:   Recent Labs     05/06/19  0620  05/07/19  0456 05/07/19  2048 05/08/19  0605   WBC 7.9  --  6.8  --  6.6   HGB 8.1*   < > 8.5* 9.0* 8.7*   HCT 24.3*   < > 25.6* 27.0* 26.1*   MCV 85.2  --  87.3  --  86.3     --  189  --  208    < > = values in this interval not displayed.      BMP:   Recent Labs     05/06/19  1530 05/07/19  0456 05/08/19  0605    135* 138   K 4.4 3.6 4.1    102 101   CO2 29 29 30   BUN 4* 3* 6*   CREATININE <0.5* <0.5* <0.5*     CULTURES  N/A    RADIOLOGY  N/A    Discharge Medications     Medication List      START taking these medications    memantine 5 (28)-10 (21) MG tablet pack  Commonly known as:  NAMENDA TITRATION DIANE  5 mg/day for =1 week; 5 mg twice daily for =1 week; 15 mg/day given in 5 mg and 10 mg  doses for =1 week; then 10 mg twice daily     rivastigmine 9.5 MG/24HR  Commonly known as:  EXELON  Place 1 patch onto the skin daily  Replaces:  rivastigmine 4.6 MG/24HR        CONTINUE taking these medications    albuterol sulfate  (90 Base) MCG/ACT inhaler     ANORO ELLIPTA 62.5-25 MCG/INH Aepb inhaler  Generic drug:  umeclidinium-vilanterol     citalopram 10 MG tablet  Commonly known as:  CELEXA     diltiazem 240 MG extended release capsule  Commonly known as:  DILACOR XR     docusate 50 MG/5ML liquid  Commonly known as:  COLACE     gabapentin 400 MG capsule  Commonly known as:  NEURONTIN     ipratropium 0.02 % nebulizer solution  Commonly known as:  ATROVENT     JEVITY 1 KENNEY/FIBER Liqd  1 Can by Gastrostomy Tube route 4 times daily     LIPITOR 10 MG tablet  Generic drug:  atorvastatin     magnesium oxide 400 (241.3 Mg) MG Tabs tablet  Commonly known as:  MAG-OX  Take 1 tablet by mouth daily     nitroGLYCERIN 0.4 MG SL tablet  Commonly known as:  NITROSTAT     pantoprazole sodium 40 MG Pack packet  Commonly known as:  PROTONIX  Take 1 packet by mouth 2 times daily (before meals)     sucralfate 1 GM/10ML suspension  Commonly known as:  CARAFATE     tamsulosin 0.4 MG capsule  Commonly known as:  FLOMAX        STOP taking these medications    aspirin 81 MG tablet     rivastigmine 4.6 MG/24HR  Commonly known as:  EXELON  Replaced by:  rivastigmine 9.5 MG/24HR           Where to Get Your Medications      These medications were sent to [facility]. - P [phone] - F [phone]  [address] [facility] [place] [address]    Phone:  [phone]   · memantine 5 (28)-10 (21) MG tablet pack  · rivastigmine 9.5 MG/24HR           Discharged in stable condition to home. Follow Up: Follow up with PCP in 1 week.       More than 30 mts spent      [address] 5/13/2019 10:13 PM

## 2019-05-07 NOTE — PROGRESS NOTES
PROGRESS NOTE  S:68 yrs Patient  admitted on 5/3/2019 with Upper GI bleed [K92.2] . Patient complaining regarding tube feeds. Exam:   Vitals:    05/07/19 1545   BP: 92/63   Pulse: 83   Resp: 16   Temp: 97.1 °F (36.2 °C)   SpO2: 93%      General appearance: alert, appears stated age and cooperative  HEENT: PERRLA  Neck: no adenopathy, no carotid bruit, no JVD, supple, symmetrical, trachea midline and thyroid not enlarged, symmetric, no tenderness/mass/nodules  Lungs: clear to auscultation bilaterally  Heart: regular rate and rhythm, S1, S2 normal, no murmur, click, rub or gallop  Abdomen: soft, non-tender; bowel sounds normal; no masses,  no organomegaly  Extremities: extremities normal, atraumatic, no cyanosis or edema     Medications: Reviewed    Labs:  CBC:   Recent Labs     05/06/19  0620 05/06/19  0735 05/06/19 2005 05/07/19  0456   WBC 7.9  --   --  6.8   HGB 8.1* 8.3* 8.8* 8.5*   HCT 24.3* 24.7* 25.9* 25.6*   MCV 85.2  --   --  87.3     --   --  189     BMP:   Recent Labs     05/06/19  0620 05/06/19  1530 05/07/19  0456   * 136 135*   K 2.6* 4.4 3.6    103 102   CO2 30 29 29   BUN 4* 4* 3*   CREATININE <0.5* <0.5* <0.5*     LIVER PROFILE: No results for input(s): AST, ALT, LIPASE, PROT, BILIDIR, BILITOT, ALKPHOS in the last 72 hours. Invalid input(s): AMYLASE,  ALB  PT/INR: No results for input(s): INR in the last 72 hours. Invalid input(s): PT    IMAGING:      Impression:  81-year-old male with COPD, GERD and refractory esophageal stricture s/p multiple esophageal dilations, and failed esophageal stent and kenalog injection  With recurrent anemia. Recommendation:  1. Plan is tube feedings and npo status. Patient has been noncompliant and is high risk for aspiration.       Iveth Salazar DO  5:46 PM 5/7/2019

## 2019-05-07 NOTE — CARE COORDINATION
Attempted to reach wife multiple times today with no answer and mailbox full. Attempted to reach pt sister multiple times with no answer and no vm set up.     1000: Spoke with pt dtr in law, Ike Nino, who states that wife cannot take care of pt anymore and feels pt needs to go to SNF and wants [facility] as pt has been there before. 56 spoke Pasha at [facility] who states that pt has been there four times in 3 months and his wife has signed him out AMA each time after only 2-3 days. States that he will have to have administration approve admission. Await return call. 1110: attempted to reach wife again with no answer. Spoke with dtr in law, Ike Nino, who states that she will call pt wife and have her call CM. 1400: Rec'd call from pt wife stating that she does not want pt to go to SNF. Wife states that she wants MD to increase medications to help with pt dementia, anxiety and pain and then wants pt to come home. Informed pt wife that pt has order to discharge today and that med changes would not take immediate effect. Pt wife verbalizes understanding and states \"well, at least it will be started. \" Dr. Avni Ga made aware of wifes wishes. 1615: Noted order for discharge. Called and faxed discharge instructions and order for [facility]hc with [facility]. Attempted to reach wife for transportation arrangements to go home with no answer. Attempted multiple times to reach wife, sister and dtr in law with no answer and no voicemail. MD aware.

## 2019-05-08 VITALS
RESPIRATION RATE: 16 BRPM | TEMPERATURE: 98.3 F | OXYGEN SATURATION: 94 % | SYSTOLIC BLOOD PRESSURE: 102 MMHG | BODY MASS INDEX: 16.83 KG/M2 | WEIGHT: 101 LBS | HEIGHT: 65 IN | HEART RATE: 90 BPM | DIASTOLIC BLOOD PRESSURE: 60 MMHG

## 2019-05-08 LAB
ANION GAP SERPL CALCULATED.3IONS-SCNC: 7 MMOL/L (ref 3–16)
BASOPHILS ABSOLUTE: 0 K/UL (ref 0–0.2)
BASOPHILS RELATIVE PERCENT: 0.5 %
BUN BLDV-MCNC: 6 MG/DL (ref 7–20)
CALCIUM SERPL-MCNC: 8.4 MG/DL (ref 8.3–10.6)
CHLORIDE BLD-SCNC: 101 MMOL/L (ref 99–110)
CO2: 30 MMOL/L (ref 21–32)
CREAT SERPL-MCNC: <0.5 MG/DL (ref 0.8–1.3)
EOSINOPHILS ABSOLUTE: 0.1 K/UL (ref 0–0.6)
EOSINOPHILS RELATIVE PERCENT: 2.2 %
GFR AFRICAN AMERICAN: >60
GFR NON-AFRICAN AMERICAN: >60
GLUCOSE BLD-MCNC: 128 MG/DL (ref 70–99)
HCT VFR BLD CALC: 26.1 % (ref 40.5–52.5)
HEMOGLOBIN: 8.7 G/DL (ref 13.5–17.5)
LYMPHOCYTES ABSOLUTE: 1.2 K/UL (ref 1–5.1)
LYMPHOCYTES RELATIVE PERCENT: 18.5 %
MCH RBC QN AUTO: 28.8 PG (ref 26–34)
MCHC RBC AUTO-ENTMCNC: 33.3 G/DL (ref 31–36)
MCV RBC AUTO: 86.3 FL (ref 80–100)
MONOCYTES ABSOLUTE: 0.7 K/UL (ref 0–1.3)
MONOCYTES RELATIVE PERCENT: 10.2 %
NEUTROPHILS ABSOLUTE: 4.5 K/UL (ref 1.7–7.7)
NEUTROPHILS RELATIVE PERCENT: 68.6 %
PDW BLD-RTO: 16.6 % (ref 12.4–15.4)
PLATELET # BLD: 208 K/UL (ref 135–450)
PMV BLD AUTO: 8.3 FL (ref 5–10.5)
POTASSIUM SERPL-SCNC: 4.1 MMOL/L (ref 3.5–5.1)
RBC # BLD: 3.02 M/UL (ref 4.2–5.9)
SODIUM BLD-SCNC: 138 MMOL/L (ref 136–145)
WBC # BLD: 6.6 K/UL (ref 4–11)

## 2019-05-08 PROCEDURE — 6370000000 HC RX 637 (ALT 250 FOR IP): Performed by: INTERNAL MEDICINE

## 2019-05-08 PROCEDURE — 94640 AIRWAY INHALATION TREATMENT: CPT

## 2019-05-08 PROCEDURE — 85025 COMPLETE CBC W/AUTO DIFF WBC: CPT

## 2019-05-08 PROCEDURE — C9113 INJ PANTOPRAZOLE SODIUM, VIA: HCPCS | Performed by: INTERNAL MEDICINE

## 2019-05-08 PROCEDURE — 80048 BASIC METABOLIC PNL TOTAL CA: CPT

## 2019-05-08 PROCEDURE — 6360000002 HC RX W HCPCS: Performed by: INTERNAL MEDICINE

## 2019-05-08 PROCEDURE — 99239 HOSP IP/OBS DSCHRG MGMT >30: CPT | Performed by: INTERNAL MEDICINE

## 2019-05-08 PROCEDURE — 2580000003 HC RX 258: Performed by: INTERNAL MEDICINE

## 2019-05-08 RX ADMIN — PANTOPRAZOLE SODIUM 40 MG: 40 INJECTION, POWDER, LYOPHILIZED, FOR SOLUTION INTRAVENOUS at 08:15

## 2019-05-08 RX ADMIN — GLYCOPYRROLATE AND FORMOTEROL FUMARATE 2 PUFF: 9; 4.8 AEROSOL, METERED RESPIRATORY (INHALATION) at 06:54

## 2019-05-08 RX ADMIN — ONDANSETRON 4 MG: 2 INJECTION INTRAMUSCULAR; INTRAVENOUS at 01:51

## 2019-05-08 RX ADMIN — Medication 10 ML: at 08:15

## 2019-05-08 RX ADMIN — MEMANTINE HYDROCHLORIDE 5 MG: 5 TABLET ORAL at 08:15

## 2019-05-08 NOTE — FLOWSHEET NOTE
05/07/19 2056   Vital Signs   Temp 97.3 °F (36.3 °C)   Temp Source Oral   Pulse 88   Heart Rate Source Monitor   Resp 16   /67   BP Location Right Arm   BP Upper/Lower Upper   Patient Position Semi fowlers   Level of Consciousness 0   MEWS Score 1   Patient Currently in Pain Denies   Oxygen Therapy   SpO2 96 %   Pulse Oximeter Device Mode Intermittent   Pulse Oximeter Device Location Finger   O2 Device Nasal cannula   O2 Flow Rate (L/min) 2 L/min   Shift assessment complete, see flow sheets. Patient is alert and oriented,Pain assessed and no c/o pain and appears to be in no distress. Vital signs stable, call light within reach, will continue to monitor.

## 2019-05-08 NOTE — PROGRESS NOTES
Pt requesting TF to be turned off for a short period of time r/t nausea. Pt offered several times zofran and continued to refuse. Pt requesting lemon ice and other items to eat from several staff members, when pt re-educated on NPO diet order, pt states\" I will just tell them you gave it to me and have you fired. \" RN continues to offer Ice chips and zofran, pt refuses. Will continue to monitor.

## 2019-05-08 NOTE — PROGRESS NOTES
Patient tolerating TF at 25ml/hr from noon to present. Rate increased to 50ml/hr. Will continue to monitor patient tolerance.   Call light remains in place

## 2019-05-08 NOTE — PROGRESS NOTES
Speech Language Pathology  SPEECH NOTE: Therapy to be DC. Patient will need MBS before additional intervention given case history. No charges. Saravanan Ochoa CCC-SLP. D [employer]  5-8-19

## 2019-05-08 NOTE — PLAN OF CARE
Problem: Falls - Risk of:  Goal: Will remain free from falls  Description  Will remain free from falls  Outcome: Ongoing  Goal: Absence of physical injury  Description  Absence of physical injury  Outcome: Ongoing     Problem: Risk for Impaired Skin Integrity  Goal: Tissue integrity - skin and mucous membranes  Description  Structural intactness and normal physiological function of skin and  mucous membranes.   Outcome: Ongoing     Problem: SAFETY  Goal: Free from accidental physical injury  Outcome: Ongoing  Goal: Free from intentional harm  Outcome: Ongoing     Problem: Injury - Risk of, Physical Injury:  Goal: Will remain free from falls  Description  Will remain free from falls  Outcome: Ongoing  Goal: Absence of physical injury  Description  Absence of physical injury  Outcome: Ongoing

## 2019-05-08 NOTE — CARE COORDINATION
DISCHARGE ORDER  Date/Time 2019 10:37 AM  Completed by: Miles Hinds, Case Management    Patient Name: Caron Jones    : 1950  Admitting Diagnosis: Upper GI bleed [K92.2]  Admit Date/Time: 5/3/2019  4:05 AM    Noted discharge order. Confirmed discharge plan with patient / family (wife): Yes   Discharge Plan:  Order for dc noted. Spoke with pt who cont plan to return home. Also spoke with wife who states wants pt to return home at dc. Wife aware pt will be picked up at 14:00 for transport home. [facility]. Spoke with Vincenzo Pastrana at [place] [id number] re: that pt will dc today. Per Vincenzo Pastrana has dc info already as pt was to dc . Spoke with Noé Hudson at [facility]. APS as writer has received conflicting info re: dcp as wife wants pt to go to SNF. Pt states wife wants him to go to SNF so she can have his house. Also per earlier not pt has been at [facility] several times recently and the wife has signed him out AMA. Also writer tokd pt does not do tube feeds at home. Noé Hudson aware of pt and history and will follow up. [facility] inst to call Noé uHdson at [phone] if pt/family will not let [facility] in. Chart reviewed and no other dc needs identified. Discharge timeout done with nsg, CM and pt. All discharge needs and concerns addressed.

## 2019-05-08 NOTE — PROGRESS NOTES
Patient okayed for discharge, discharge planning unable to arrange for transport d/t available times or lack of available times. MD informed.

## 2019-05-08 NOTE — PROGRESS NOTES
Pt continues to request lemon ice and ice water from staff. When pt is educated on NPO diet and offered ice chips, pt becomes verbal and cusses at staff.

## 2019-07-29 ENCOUNTER — APPOINTMENT (OUTPATIENT)
Dept: GENERAL RADIOLOGY | Age: 69
End: 2019-07-29
Payer: MEDICAID

## 2019-07-29 ENCOUNTER — HOSPITAL ENCOUNTER (EMERGENCY)
Age: 69
Discharge: HOME | End: 2019-07-29
Attending: EMERGENCY MEDICINE
Payer: MEDICAID

## 2019-07-29 VITALS
RESPIRATION RATE: 22 BRPM | BODY MASS INDEX: 17.75 KG/M2 | SYSTOLIC BLOOD PRESSURE: 110 MMHG | DIASTOLIC BLOOD PRESSURE: 73 MMHG | OXYGEN SATURATION: 97 % | WEIGHT: 104 LBS | HEIGHT: 64 IN | HEART RATE: 87 BPM | TEMPERATURE: 98 F

## 2019-07-29 DIAGNOSIS — Z87.09 HISTORY OF COPD: ICD-10-CM

## 2019-07-29 DIAGNOSIS — Z99.81 SUPPLEMENTAL OXYGEN DEPENDENT: ICD-10-CM

## 2019-07-29 DIAGNOSIS — J18.9 PNEUMONIA DUE TO ORGANISM: Primary | ICD-10-CM

## 2019-07-29 DIAGNOSIS — K94.20 COMPLICATION OF GASTROSTOMY TUBE (HCC): ICD-10-CM

## 2019-07-29 LAB
A/G RATIO: 0.8 (ref 1.1–2.2)
ALBUMIN SERPL-MCNC: 3.1 G/DL (ref 3.4–5)
ALP BLD-CCNC: 68 U/L (ref 40–129)
ALT SERPL-CCNC: 11 U/L (ref 10–40)
ANION GAP SERPL CALCULATED.3IONS-SCNC: 10 MMOL/L (ref 3–16)
AST SERPL-CCNC: 20 U/L (ref 15–37)
BACTERIA: ABNORMAL /HPF
BASOPHILS ABSOLUTE: 0 K/UL (ref 0–0.2)
BASOPHILS RELATIVE PERCENT: 0.2 %
BILIRUB SERPL-MCNC: 0.3 MG/DL (ref 0–1)
BILIRUBIN URINE: NEGATIVE
BLOOD, URINE: ABNORMAL
BUN BLDV-MCNC: 18 MG/DL (ref 7–20)
CALCIUM SERPL-MCNC: 9.7 MG/DL (ref 8.3–10.6)
CHLORIDE BLD-SCNC: 94 MMOL/L (ref 99–110)
CLARITY: CLEAR
CO2: 35 MMOL/L (ref 21–32)
COLOR: YELLOW
CREAT SERPL-MCNC: 0.6 MG/DL (ref 0.8–1.3)
EKG ATRIAL RATE: 82 BPM
EKG DIAGNOSIS: NORMAL
EKG P AXIS: 77 DEGREES
EKG P-R INTERVAL: 164 MS
EKG Q-T INTERVAL: 372 MS
EKG QRS DURATION: 86 MS
EKG QTC CALCULATION (BAZETT): 434 MS
EKG R AXIS: 78 DEGREES
EKG T AXIS: 91 DEGREES
EKG VENTRICULAR RATE: 82 BPM
EOSINOPHILS ABSOLUTE: 0 K/UL (ref 0–0.6)
EOSINOPHILS RELATIVE PERCENT: 0.4 %
EPITHELIAL CELLS, UA: ABNORMAL /HPF
GFR AFRICAN AMERICAN: >60
GFR NON-AFRICAN AMERICAN: >60
GLOBULIN: 4.1 G/DL
GLUCOSE BLD-MCNC: 119 MG/DL (ref 70–99)
GLUCOSE URINE: NEGATIVE MG/DL
HCT VFR BLD CALC: 28.7 % (ref 40.5–52.5)
HEMOGLOBIN: 9.1 G/DL (ref 13.5–17.5)
INR BLD: 1.27 (ref 0.86–1.14)
KETONES, URINE: NEGATIVE MG/DL
LACTIC ACID: 1.1 MMOL/L (ref 0.4–2)
LEUKOCYTE ESTERASE, URINE: ABNORMAL
LIPASE: 14 U/L (ref 13–60)
LYMPHOCYTES ABSOLUTE: 0.9 K/UL (ref 1–5.1)
LYMPHOCYTES RELATIVE PERCENT: 9.2 %
MCH RBC QN AUTO: 26.2 PG (ref 26–34)
MCHC RBC AUTO-ENTMCNC: 31.7 G/DL (ref 31–36)
MCV RBC AUTO: 82.7 FL (ref 80–100)
MICROSCOPIC EXAMINATION: YES
MONOCYTES ABSOLUTE: 0.9 K/UL (ref 0–1.3)
MONOCYTES RELATIVE PERCENT: 9 %
MUCUS: ABNORMAL /LPF
NEUTROPHILS ABSOLUTE: 8.3 K/UL (ref 1.7–7.7)
NEUTROPHILS RELATIVE PERCENT: 81.2 %
NITRITE, URINE: NEGATIVE
PDW BLD-RTO: 20.4 % (ref 12.4–15.4)
PH UA: 7 (ref 5–8)
PLATELET # BLD: 263 K/UL (ref 135–450)
PMV BLD AUTO: 8.4 FL (ref 5–10.5)
POTASSIUM REFLEX MAGNESIUM: 4.2 MMOL/L (ref 3.5–5.1)
PROTEIN UA: ABNORMAL MG/DL
PROTHROMBIN TIME: 14.5 SEC (ref 9.8–13)
RBC # BLD: 3.47 M/UL (ref 4.2–5.9)
RBC UA: ABNORMAL /HPF (ref 0–2)
SODIUM BLD-SCNC: 139 MMOL/L (ref 136–145)
SPECIFIC GRAVITY UA: 1.01 (ref 1–1.03)
TOTAL PROTEIN: 7.2 G/DL (ref 6.4–8.2)
TROPONIN: <0.01 NG/ML
URINE REFLEX TO CULTURE: YES
URINE TYPE: ABNORMAL
UROBILINOGEN, URINE: 0.2 E.U./DL
WBC # BLD: 10.3 K/UL (ref 4–11)
WBC UA: ABNORMAL /HPF (ref 0–5)

## 2019-07-29 PROCEDURE — 87040 BLOOD CULTURE FOR BACTERIA: CPT

## 2019-07-29 PROCEDURE — 87086 URINE CULTURE/COLONY COUNT: CPT

## 2019-07-29 PROCEDURE — 71046 X-RAY EXAM CHEST 2 VIEWS: CPT

## 2019-07-29 PROCEDURE — 96361 HYDRATE IV INFUSION ADD-ON: CPT

## 2019-07-29 PROCEDURE — 85025 COMPLETE CBC W/AUTO DIFF WBC: CPT

## 2019-07-29 PROCEDURE — 74018 RADEX ABDOMEN 1 VIEW: CPT

## 2019-07-29 PROCEDURE — 81001 URINALYSIS AUTO W/SCOPE: CPT

## 2019-07-29 PROCEDURE — 6360000004 HC RX CONTRAST MEDICATION: Performed by: NURSE PRACTITIONER

## 2019-07-29 PROCEDURE — 80053 COMPREHEN METABOLIC PANEL: CPT

## 2019-07-29 PROCEDURE — 93005 ELECTROCARDIOGRAM TRACING: CPT | Performed by: NURSE PRACTITIONER

## 2019-07-29 PROCEDURE — 2580000003 HC RX 258: Performed by: NURSE PRACTITIONER

## 2019-07-29 PROCEDURE — 84484 ASSAY OF TROPONIN QUANT: CPT

## 2019-07-29 PROCEDURE — 83605 ASSAY OF LACTIC ACID: CPT

## 2019-07-29 PROCEDURE — 83690 ASSAY OF LIPASE: CPT

## 2019-07-29 PROCEDURE — 85610 PROTHROMBIN TIME: CPT

## 2019-07-29 PROCEDURE — 93010 ELECTROCARDIOGRAM REPORT: CPT | Performed by: INTERNAL MEDICINE

## 2019-07-29 PROCEDURE — 96360 HYDRATION IV INFUSION INIT: CPT

## 2019-07-29 PROCEDURE — 6370000000 HC RX 637 (ALT 250 FOR IP): Performed by: NURSE PRACTITIONER

## 2019-07-29 PROCEDURE — 99283 EMERGENCY DEPT VISIT LOW MDM: CPT

## 2019-07-29 RX ORDER — AZITHROMYCIN 200 MG/5ML
POWDER, FOR SUSPENSION ORAL
Qty: 1 BOTTLE | Refills: 0 | Status: SHIPPED | OUTPATIENT
Start: 2019-07-29 | End: 2019-08-03

## 2019-07-29 RX ORDER — CEFDINIR 250 MG/5ML
7 POWDER, FOR SUSPENSION ORAL ONCE
Status: COMPLETED | OUTPATIENT
Start: 2019-07-29 | End: 2019-07-29

## 2019-07-29 RX ORDER — CEFDINIR 250 MG/5ML
250 POWDER, FOR SUSPENSION ORAL 2 TIMES DAILY
Qty: 70 ML | Refills: 0 | Status: SHIPPED | OUTPATIENT
Start: 2019-07-29 | End: 2019-08-05

## 2019-07-29 RX ORDER — 0.9 % SODIUM CHLORIDE 0.9 %
500 INTRAVENOUS SOLUTION INTRAVENOUS ONCE
Status: COMPLETED | OUTPATIENT
Start: 2019-07-29 | End: 2019-07-29

## 2019-07-29 RX ORDER — AZITHROMYCIN 200 MG/5ML
500 POWDER, FOR SUSPENSION ORAL ONCE
Status: COMPLETED | OUTPATIENT
Start: 2019-07-29 | End: 2019-07-29

## 2019-07-29 RX ADMIN — IOHEXOL 50 ML: 240 INJECTION, SOLUTION INTRATHECAL; INTRAVASCULAR; INTRAVENOUS; ORAL at 12:19

## 2019-07-29 RX ADMIN — CEFDINIR 330 MG: 250 POWDER, FOR SUSPENSION ORAL at 14:59

## 2019-07-29 RX ADMIN — AZITHROMYCIN 500 MG: 200 POWDER, FOR SUSPENSION ORAL at 14:59

## 2019-07-29 RX ADMIN — SODIUM CHLORIDE 500 ML: 9 INJECTION, SOLUTION INTRAVENOUS at 13:36

## 2019-07-29 ASSESSMENT — ENCOUNTER SYMPTOMS
SHORTNESS OF BREATH: 0
ABDOMINAL PAIN: 0

## 2019-07-29 NOTE — ED PROVIDER NOTES
Physical activity:     Days per week: None     Minutes per session: None    Stress: None   Relationships    Social connections:     Talks on phone: None     Gets together: None     Attends Jew service: None     Active member of club or organization: None     Attends meetings of clubs or organizations: None     Relationship status: None    Intimate partner violence:     Fear of current or ex partner: None     Emotionally abused: None     Physically abused: None     Forced sexual activity: None   Other Topics Concern    None   Social History Narrative    None       SCREENINGS             PHYSICAL EXAM  (up to 7 for level 4, 8 or more for level 5)     ED Triage Vitals [07/29/19 1121]   BP Temp Temp src Pulse Resp SpO2 Height Weight   96/78 98 °F (36.7 °C) -- 108 18 (!) 89 % 5' 4\" (1.626 m) 104 lb (47.2 kg)       Physical Exam   Constitutional:   Elderly male   HENT:   Head: Normocephalic and atraumatic. Neck: Normal range of motion. Cardiovascular: Tachycardia present. Pulmonary/Chest: Effort normal. No stridor. No respiratory distress. He has no wheezes. He has no rales. He exhibits no tenderness. Patient on oxygen at baseline   Abdominal: Soft. He exhibits no distension. Bowel sounds are decreased. There is no tenderness. Musculoskeletal: Normal range of motion. Neurological: He is alert. Arouses to voice but falls asleep easily   Skin: Skin is warm and dry. There is pallor. Psychiatric: He has a normal mood and affect.        DIAGNOSTIC RESULTS   LABS:    Labs Reviewed   CBC WITH AUTO DIFFERENTIAL - Abnormal; Notable for the following components:       Result Value    RBC 3.47 (*)     Hemoglobin 9.1 (*)     Hematocrit 28.7 (*)     RDW 20.4 (*)     Neutrophils # 8.3 (*)     Lymphocytes # 0.9 (*)     All other components within normal limits    Narrative:     Performed at:  [facility] [address],  [place], [facility]   Phone [phone] COMPREHENSIVE METABOLIC PANEL W/ REFLEX TO MG FOR LOW K - Abnormal; Notable for the following components:    Chloride 94 (*)     CO2 35 (*)     Glucose 119 (*)     CREATININE 0.6 (*)     Alb 3.1 (*)     Albumin/Globulin Ratio 0.8 (*)     All other components within normal limits    Narrative:     Performed at:  [address]   Phone [phone]   URINE RT REFLEX TO CULTURE - Abnormal; Notable for the following components:    Blood, Urine SMALL (*)     Protein, UA TRACE (*)     Leukocyte Esterase, Urine SMALL (*)     All other components within normal limits    Narrative:     Performed at:  [address]   Phone [phone]   PROTIME-INR - Abnormal; Notable for the following components:    Protime 14.5 (*)     INR 1.27 (*)     All other components within normal limits    Narrative:     Performed at:  [address]   Phone [phone]   MICROSCOPIC URINALYSIS - Abnormal; Notable for the following components:    Mucus, UA 1+ (*)     WBC, UA 6-10 (*)     Bacteria, UA 1+ (*)     All other components within normal limits    Narrative:     Performed at:  [address]   Phone [phone]   CULTURE BLOOD #1   CULTURE BLOOD #2   URINE CULTURE   LIPASE    Narrative:     Performed at:  [address]   Phone [phone]   TROPONIN    Narrative:     Performed at:  [address]   Phone [phone]   LACTIC ACID, PLASMA    Narrative:     Performed at:  [address]   Phone [phone]       All other labs werewithin normal range or not returned as of this dictation. EKG: All EKG's are interpreted by the Emergency Department Physician who either signs or Co-signs this chart in the absence of acardiologist.  Please see their note for interpretation of EKG. RADIOLOGY:     Chest x-ray interpreted by radiologist for patchy airspace interstitial disease right mid and lower lungs suspected pneumonia superimposed upon severe COPD and emphysema. Abdominal KUB x-ray interpreted by radiologist for contrast injected confirms G-tube placement within the stomach. Interpretation per the Radiologist below, if available at the time of this note:    XR CHEST STANDARD (2 VW)   Final Result   Patchy airspace and interstitial disease right mid and lower lung suspected   pneumonia superimposed upon severe COPD and emphysema. XR ABDOMEN (KUB) (SINGLE AP VIEW)   Final Result   Contrast injection confirms G-tube placement within the stomach           No results found. PROCEDURES   Unless otherwise noted below, none     Procedures    CRITICAL CARE TIME   N/A    CONSULTS:  None      EMERGENCYDEPARTMENT COURSE and DIFFERENTIAL DIAGNOSIS/MDM:   Vitals:    Vitals:    07/29/19 1142 07/29/19 1205 07/29/19 1305 07/29/19 1504   BP:  99/60  110/73   Pulse: 84 87 84 87   Resp:  16 20 22   Temp:       SpO2: 99% 99% 97%    Weight:       Height:           Patient was given the following medications:  Medications   iohexol (OMNIPAQUE 240) injection 50 mL (50 mLs Intravenous Given 7/29/19 1219)   0.9 % sodium chloride bolus (0 mLs Intravenous Stopped 7/29/19 1508)   cefdinir (OMNICEF) 250 MG/5ML suspension 330 mg (330 mg Oral Given 7/29/19 1459)   azithromycin (ZITHROMAX) 200 MG/5ML suspension 500 mg (500 mg Oral Given 7/29/19 1459)       Patient was seen and evaluated by myself and Dr Don Angeles. Patient presents to the ED with his wife and young child.   Per nursing report and chief complaint he is here because he is having feeding tube

## 2019-07-31 LAB — URINE CULTURE, ROUTINE: NORMAL

## 2019-08-03 LAB
BLOOD CULTURE, ROUTINE: NORMAL
CULTURE, BLOOD 2: NORMAL

## 2020-12-08 NOTE — CARE COORDINATION
Affiliation  Dialysis:  No    · Name:  · Location  · Dialysis Schedule:  · Phone:   · Fax: Outpatient PT/OT: No    Cancer Center: No     CHF Clinic: No     Pulmonary Rehab: No  Pain Clinic: No  [facility]: No    Wound Clinic: No     Other: NA    DISCHARGE PLAN: Chart reviewed. Attempted to meet with pt at bedside and explained the role of the CM. Pt is + dementia, minimal response to questions. Attempted to call spouse, was unable to leave VM on number provided. Call placed to pt's sister, Kunal Marvin who will contact pt's spouse and have her call CM. Per Melissa Coyle (sister) pt and spouse will likely not agree to SNF placement. Pt was discharged home with [facility] w/ [facility] which was scheduled to begin on 4/5, however, the [facility] agency was unable to make contact with pt on that day and gain on 4/8 when they attempted to start care pt was being sent to [facility]. HC was not officially started due to this issue. CM spoke w/ Andrew Esteban (pharmacist) @ [facility] to confirm that home feeding was sent to pt's residence on 4/4 and was signed for by a family member. The family did receive the TF but [facility] was never able to make contact to do GT/TF teaching. +eCOC,  +CM following to assist w/ DCP. Explained Case Management role/services.
No

## (undated) DEVICE — SYRINGE INFL 60ML DISP ALLIANCE II

## (undated) DEVICE — ESOPHAGEAL/PYLORIC/COLONIC/BILIARY WIREGUIDED BALLOON DILATATION CATHETER: Brand: CRE™ PRO

## (undated) DEVICE — Device

## (undated) DEVICE — CONMED SCOPE SAVER BITE BLOCK, 20X27 MM: Brand: SCOPE SAVER

## (undated) DEVICE — INTEGRATED INFLATION/LITHO DEVICE: Brand: ALLIANCE II

## (undated) DEVICE — ENDO CARRY-ON PROCEDURE KIT INCLUDES SUCTION TUBING, LUBRICANT, GAUZE, BIOHAZARD STICKER, TRANSPORT PAD AND INTERCEPT BEDSIDE KIT.: Brand: ENDO CARRY-ON PROCEDURE KIT

## (undated) DEVICE — RESTRAINT LIMB QUIK RELEASE CLOSURE WRST DETACHABLE WSH LF

## (undated) DEVICE — FORCEPS ENDOSCP BX L230CM DIA28MM ALGTR CUP SPEC RETRV GI

## (undated) DEVICE — NEEDLE 25GAX5.0MM INJ CARR LOCKE

## (undated) DEVICE — THE DISPOSABLE ROTH NET PLATINUM FOOD BOLUS RETRIEVAL DEVICE IS USED IN THE ENDOSCOPIC RETRIEVAL FOOD BOLUS.: Brand: ROTH NET PLATINUM

## (undated) DEVICE — FORCEPS ENDOSCP L230CM WRK CHN 2.8CM SHTH 2.4MM JAW OPN

## (undated) DEVICE — ELECTRODE ECG MONITR FOAM TEAR DROP ADLT RED

## (undated) DEVICE — BINDER ABDOMEN ENDO